# Patient Record
Sex: FEMALE | Race: BLACK OR AFRICAN AMERICAN | Employment: UNEMPLOYED | URBAN - METROPOLITAN AREA
[De-identification: names, ages, dates, MRNs, and addresses within clinical notes are randomized per-mention and may not be internally consistent; named-entity substitution may affect disease eponyms.]

---

## 2017-01-10 ENCOUNTER — HOSPITAL ENCOUNTER (OUTPATIENT)
Dept: PREADMISSION TESTING | Age: 67
Discharge: HOME | End: 2017-01-10
Payer: OTHER GOVERNMENT

## 2017-01-10 VITALS
OXYGEN SATURATION: 95 % | WEIGHT: 233.69 LBS | HEIGHT: 66 IN | DIASTOLIC BLOOD PRESSURE: 91 MMHG | BODY MASS INDEX: 37.56 KG/M2 | RESPIRATION RATE: 20 BRPM | SYSTOLIC BLOOD PRESSURE: 135 MMHG | TEMPERATURE: 98.6 F | HEART RATE: 76 BPM

## 2017-01-10 LAB
ATRIAL RATE: 65 BPM
CALCULATED P AXIS, ECG09: 72 DEGREES
CALCULATED R AXIS, ECG10: 28 DEGREES
CALCULATED T AXIS, ECG11: 44 DEGREES
DIAGNOSIS, 93000: NORMAL
P-R INTERVAL, ECG05: 172 MS
Q-T INTERVAL, ECG07: 394 MS
QRS DURATION, ECG06: 70 MS
QTC CALCULATION (BEZET), ECG08: 409 MS
VENTRICULAR RATE, ECG03: 65 BPM

## 2017-01-10 PROCEDURE — 93005 ELECTROCARDIOGRAM TRACING: CPT

## 2017-01-10 NOTE — PERIOP NOTES
[facility]  PREOPERATIVE INSTRUCTIONS    Surgery Date:   1/18/2017  Surgery arrival time given by surgeon: YES 0800  1. Please report at the designated time to the 2nd [address] [facility]. Bring your insurance card, photo identification, and any copayment ( if applicable). 2. You must have a responsible adult to drive you home. You need to have a responsible adult to stay with you the first 24 hours after surgery if you are going home the same day of your surgery and you should not drive a car for 24 hours following your surgery. 3. Nothing to eat or drink after midnight the night before surgery. This includes no water, gum, mints, coffee, juice, etc.  Please note special instructions, if applicable, below for medications. 4. MEDICATIONS TO TAKE THE MORNING OF SURGERY WITH A SIP OF WATER: Zantac  5. No alcoholic beverages 24 hours before or after your surgery. 6. If you are being admitted to the hospital,please leave personal belongings/luggage in your car until you have an assigned hospital room number. 7. Stop Aspirin and/or any non-steroidal anti-inflammatory drugs (i.e. Ibuprofen, Naproxen, Advil, Aleve) as directed by your surgeon. You may take Tylenol. Stop herbal supplements 1 week prior to  surgery. 8. If you are currently taking Plavix, Coumadin,or any other blood-thinning/anticoagulant medication contact your surgeon for instructions. 9. Please wear comfortable clothes. Wear your glasses instead of contacts. We ask that all money, jewelry and valuables be left at home. Wear no make up, particularly mascara, the day of surgery. 10.  All body piercings, rings,and jewelry need to be removed and left at home. Please wear your hair loose or down. Please no pony-tails, buns, or any metal hair accessories. If you shower the morning of surgery, please do not apply any lotions, powders, or deodorants afterwards. Do not shave any body area within 24 hours of your surgery.   11. Please follow all instructions to avoid any potential surgical cancellation. 12.  Should your physical condition change, (i.e. fever, cold, flu, etc.) please notify your surgeon as soon as possible. 13. It is important to be on time. If a situation occurs where you may be delayed, please call: / [phone] on the day of surgery. 14. The [facility] staff can be reached at 21 [phone]. .  15. Special instructions: free  parking,wear bra for support    The patient was contacted  in person. She  verbalize  understanding of all instructions does not  need reinforcement.

## 2017-01-17 ENCOUNTER — ANESTHESIA EVENT (OUTPATIENT)
Dept: SURGERY | Age: 67
End: 2017-01-17
Payer: OTHER GOVERNMENT

## 2017-01-18 ENCOUNTER — APPOINTMENT (OUTPATIENT)
Dept: MAMMOGRAPHY | Age: 67
End: 2017-01-18
Attending: SURGERY
Payer: OTHER GOVERNMENT

## 2017-01-18 ENCOUNTER — SURGERY (OUTPATIENT)
Age: 67
End: 2017-01-18

## 2017-01-18 ENCOUNTER — ANESTHESIA (OUTPATIENT)
Dept: SURGERY | Age: 67
End: 2017-01-18
Payer: OTHER GOVERNMENT

## 2017-01-18 ENCOUNTER — TELEPHONE (OUTPATIENT)
Dept: SURGERY | Age: 67
End: 2017-01-18

## 2017-01-18 DIAGNOSIS — C50.412 BREAST CANCER OF UPPER-OUTER QUADRANT OF LEFT FEMALE BREAST (HCC): Primary | ICD-10-CM

## 2017-01-18 PROCEDURE — 77030020143 HC AIRWY LARYN INTUB CGAS -A: Performed by: ANESTHESIOLOGY

## 2017-01-18 PROCEDURE — 74011250636 HC RX REV CODE- 250/636

## 2017-01-18 PROCEDURE — 76000 FLUOROSCOPY <1 HR PHYS/QHP: CPT | Performed by: ANESTHESIOLOGY

## 2017-01-18 PROCEDURE — 77030020782 HC GWN BAIR PAWS FLX 3M -B

## 2017-01-18 PROCEDURE — 74011000250 HC RX REV CODE- 250

## 2017-01-18 RX ORDER — MIDAZOLAM HYDROCHLORIDE 1 MG/ML
INJECTION, SOLUTION INTRAMUSCULAR; INTRAVENOUS AS NEEDED
Status: DISCONTINUED | OUTPATIENT
Start: 2017-01-18 | End: 2017-01-18 | Stop reason: HOSPADM

## 2017-01-18 RX ORDER — FENTANYL CITRATE 50 UG/ML
INJECTION, SOLUTION INTRAMUSCULAR; INTRAVENOUS AS NEEDED
Status: DISCONTINUED | OUTPATIENT
Start: 2017-01-18 | End: 2017-01-18 | Stop reason: HOSPADM

## 2017-01-18 RX ORDER — DEXAMETHASONE SODIUM PHOSPHATE 4 MG/ML
INJECTION, SOLUTION INTRA-ARTICULAR; INTRALESIONAL; INTRAMUSCULAR; INTRAVENOUS; SOFT TISSUE AS NEEDED
Status: DISCONTINUED | OUTPATIENT
Start: 2017-01-18 | End: 2017-01-18 | Stop reason: HOSPADM

## 2017-01-18 RX ORDER — ONDANSETRON 2 MG/ML
INJECTION INTRAMUSCULAR; INTRAVENOUS AS NEEDED
Status: DISCONTINUED | OUTPATIENT
Start: 2017-01-18 | End: 2017-01-18 | Stop reason: HOSPADM

## 2017-01-18 RX ORDER — LIDOCAINE HYDROCHLORIDE 20 MG/ML
INJECTION, SOLUTION EPIDURAL; INFILTRATION; INTRACAUDAL; PERINEURAL AS NEEDED
Status: DISCONTINUED | OUTPATIENT
Start: 2017-01-18 | End: 2017-01-18 | Stop reason: HOSPADM

## 2017-01-18 RX ORDER — PROPOFOL 10 MG/ML
INJECTION, EMULSION INTRAVENOUS AS NEEDED
Status: DISCONTINUED | OUTPATIENT
Start: 2017-01-18 | End: 2017-01-18 | Stop reason: HOSPADM

## 2017-01-18 RX ADMIN — METHYLENE BLUE 4 ML: 10 INJECTION, SOLUTION INTRAVENOUS at 10:07

## 2017-01-18 RX ADMIN — FENTANYL CITRATE 50 MCG: 50 INJECTION, SOLUTION INTRAMUSCULAR; INTRAVENOUS at 09:46

## 2017-01-18 RX ADMIN — ONDANSETRON 4 MG: 2 INJECTION INTRAMUSCULAR; INTRAVENOUS at 09:59

## 2017-01-18 RX ADMIN — FENTANYL CITRATE 50 MCG: 50 INJECTION, SOLUTION INTRAMUSCULAR; INTRAVENOUS at 09:41

## 2017-01-18 RX ADMIN — FENTANYL CITRATE 50 MCG: 50 INJECTION, SOLUTION INTRAMUSCULAR; INTRAVENOUS at 10:03

## 2017-01-18 RX ADMIN — MIDAZOLAM HYDROCHLORIDE 2 MG: 1 INJECTION, SOLUTION INTRAMUSCULAR; INTRAVENOUS at 09:41

## 2017-01-18 RX ADMIN — DEXAMETHASONE SODIUM PHOSPHATE 8 MG: 4 INJECTION, SOLUTION INTRA-ARTICULAR; INTRALESIONAL; INTRAMUSCULAR; INTRAVENOUS; SOFT TISSUE at 09:59

## 2017-01-18 RX ADMIN — LIDOCAINE HYDROCHLORIDE 40 MG: 20 INJECTION, SOLUTION EPIDURAL; INFILTRATION; INTRACAUDAL; PERINEURAL at 09:46

## 2017-01-18 RX ADMIN — BUPIVACAINE HYDROCHLORIDE AND EPINEPHRINE 10 ML: 5; 5 INJECTION, SOLUTION EPIDURAL; INTRACAUDAL; PERINEURAL at 10:33

## 2017-01-18 RX ADMIN — PROPOFOL 150 MG: 10 INJECTION, EMULSION INTRAVENOUS at 09:46

## 2017-01-18 NOTE — ANESTHESIA PREPROCEDURE EVALUATION
Anesthetic History     PONV          Review of Systems / Medical History  Patient summary reviewed and pertinent labs reviewed    Pulmonary        Sleep apnea        Comments: ??  To have sleep study   Neuro/Psych   Within defined limits           Cardiovascular                  Exercise tolerance: >4 METS     GI/Hepatic/Renal     GERD: well controlled           Endo/Other        Obesity     Other Findings              Physical Exam    Airway  Mallampati: II  TM Distance: 4 - 6 cm  Neck ROM: normal range of motion   Mouth opening: Normal     Cardiovascular  Regular rate and rhythm,  S1 and S2 normal,  no murmur, click, rub, or gallop  Rhythm: regular  Rate: normal         Dental  No notable dental hx       Pulmonary  Breath sounds clear to auscultation               Abdominal  GI exam deferred       Other Findings            Anesthetic Plan    ASA: 2  Anesthesia type: general          Induction: Intravenous  Anesthetic plan and risks discussed with: Patient

## 2017-01-18 NOTE — ANESTHESIA POSTPROCEDURE EVALUATION
Post-Anesthesia Evaluation and Assessment    Patient: Shirley Maradiaga MRN: [id number]  SSN: [id number]    YOB: 1950  Age: 61 y.o. Sex: female       Cardiovascular Function/Vital Signs  Visit Vitals    /78    Pulse 67    Temp 36.4 °C (97.5 °F)    Resp 17    Ht 5' 6\" (1.676 m)    Wt 105.9 kg (233 lb 7.5 oz)    SpO2 99%    BMI 37.68 kg/m2       Patient is status post general anesthesia for Procedure(s):  LEFT BREAST LUMPECTOMY AND LEFT BREAST SENTINEL NODE BIOPSY WITH BLUE DYE  SENTINEL NODE BIOPSY. Nausea/Vomiting: None    Postoperative hydration reviewed and adequate. Pain:  Pain Scale 1: Numeric (0 - 10) (01/18/17 1157)  Pain Intensity 1: 3 (01/18/17 1157)   Managed    Neurological Status:   Neuro (WDL): Exceptions to WDL (01/18/17 1105)  Neuro  Neurologic State: Drowsy; Eyes open to voice (01/18/17 1105)  LUE Motor Response: Purposeful (01/18/17 1105)  LLE Motor Response: Purposeful (01/18/17 1105)  RUE Motor Response: Purposeful (01/18/17 1105)  RLE Motor Response: Purposeful (01/18/17 1105)   At baseline    Mental Status and Level of Consciousness: Arousable    Pulmonary Status:   O2 Device: Room air (01/18/17 1105)   Adequate oxygenation and airway patent    Complications related to anesthesia: None    Post-anesthesia assessment completed.  No concerns    Signed By: Wendy Owen MD     January 18, 2017

## 2017-01-19 ENCOUNTER — HOSPITAL ENCOUNTER (OUTPATIENT)
Dept: GENERAL RADIOLOGY | Age: 67
Setting detail: OUTPATIENT SURGERY
Discharge: HOME | End: 2017-01-19
Attending: SURGERY
Payer: OTHER GOVERNMENT

## 2017-01-19 DIAGNOSIS — Z98.890 S/P LUMPECTOMY, LEFT BREAST: ICD-10-CM

## 2017-01-19 PROCEDURE — 76000 FLUOROSCOPY <1 HR PHYS/QHP: CPT

## 2017-01-24 ENCOUNTER — OFFICE VISIT (OUTPATIENT)
Dept: SURGERY | Age: 67
End: 2017-01-24

## 2017-01-24 VITALS
WEIGHT: 233 LBS | HEIGHT: 66 IN | SYSTOLIC BLOOD PRESSURE: 136 MMHG | DIASTOLIC BLOOD PRESSURE: 96 MMHG | HEART RATE: 96 BPM | BODY MASS INDEX: 37.45 KG/M2

## 2017-01-24 DIAGNOSIS — C50.412 BREAST CANCER OF UPPER-OUTER QUADRANT OF LEFT FEMALE BREAST (HCC): Primary | ICD-10-CM

## 2017-01-24 NOTE — PROGRESS NOTES
HISTORY OF PRESENT ILLNESS  Donna Garay is a 61 y.o. female. HPI ESTABLISHED patient POD#6 LEFT lumpectomy and SNBx . Patient is doing well. The incisions are looking better than they did last week. She has a lot of bruising. No pain but she feels a pulling sensation when she moves around. Incisions are dry and intact. 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 0/4 nodes,  %. [place] 80%, Her 2 negative    ROS    Physical Exam   Pulmonary/Chest: Left breast exhibits skin change (diffuse ecchymosis). Left breast exhibits no tenderness. ASPIRATION OF HEMATOMA  Indication : Hematoma: Left Breast upper outer quadrant  Prep : Alcohol. Guidance : Ultrasound guidance. Yield :  2 cc old blood was aspirated with an 18 gauge needle. Effect : The hematoma was aspirated. Diposition:  Follow up 1 - 2 weeks if pain persists  ASSESSMENT and PLAN    ICD-10-CM ICD-9-CM    1. 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 Reviewed pathology report. 2. Pt has a lot of bruising. Minimal aspiration. Follow up next week if swelling has not resolved.   3. appts with med onc and rad onc

## 2017-01-24 NOTE — TELEPHONE ENCOUNTER
appointment 02/13/2017 @ 3:00 PM   appointment 02/16/2017 @ 1:00 PM  I spoke with the patient and gave her this information. marianna

## 2017-01-24 NOTE — MR AVS SNAPSHOT
Visit Information Date & Time Provider Department Dept. Phone Encounter #  
 1/24/2017 10:00 AM Patricia Green  [facility] [phone] [id number] Your Appointments 1/26/2017 11:20 AM  
New Patient with Isrrael Weston MD  
[address] [facility] ([address]) Appt Note: NP; referred by Dr. Niya Arenas at [facility]; JELLY; Dx 5-6yrs ago; used to be on PAP  
 [address] [facility] [phone] [address] [facility] [address] [address] 2/13/2017  3:00 PM  
New Patient with Malena Alicia MD  
[address] [facility]) Appt Note: new joseph breast  
 [address] [facility] [address] [id number]  
[address] [place] [address] [address] [facility] [address] Reagan Crawford Upcoming Health Maintenance Date Due Hepatitis C Screening 1950 Pneumococcal 19-64 Highest Risk (1 of 3 - PCV13) 6/22/1971 DTaP/Tdap/Td series (1 - Tdap) 6/22/1973 PAP AKA CERVICAL CYTOLOGY 6/22/1973 FOBT Q 1 YEAR AGE 50-75 6/22/2002 ZOSTER VACCINE AGE 60> 6/22/2012 INFLUENZA AGE 9 TO ADULT 8/1/2016 BREAST CANCER SCRN MAMMOGRAM 11/22/2018 Allergies as of 1/24/2017  Review Complete On: 1/24/2017 By: Patricia Green MD  
  
 Severity Noted Reaction Type Reactions Other Medication  01/10/2017    Hives  
 holter monitor pads Current Immunizations  Never Reviewed No immunizations on file. Not reviewed this visit Vitals BP Pulse Height(growth percentile) Weight(growth percentile) BMI OB Status (!) 136/96 96 5' 6\" (1.676 m) 233 lb (105.7 kg) 37.61 kg/m2 Hysterectomy Smoking Status Never Smoker Vitals History BMI and BSA Data Body Mass Index Body Surface Area  
 37.61 kg/m 2 2.22 m 2 Your Updated Medication List  
  
   
 This list is accurate as of: 1/24/17 11:37 AM.  Always use your most recent med list.  
  
  
  
  
 ASPIR-LOW 81 mg tablet Generic drug:  aspirin delayed-release Takes irregularly,usually weekly DAILY MULTI-VITAMINS/IRON tablet Generic drug:  multivitamin with iron Take 1 Tab by mouth daily. HYDROcodone-acetaminophen 5-325 mg per tablet Commonly known as:  March Castles Take 1-2 Tabs by mouth every four (4) hours as needed for Pain. Max Daily Amount: 12 Tabs. raNITIdine 150 mg tablet Commonly known as:  ZANTAC  
daily. simvastatin 20 mg tablet Commonly known as:  ZOCOR  
20 mg nightly. Patient Instructions Breast Cancer: Care Instructions Your Care Instructions Breast cancer occurs when abnormal cells grow out of control in the breast. These cancer cells can spread within the breast, to nearby lymph nodes and other tissues, and to other parts of the body. Being treated for cancer can weaken your body, and you may feel very tired. Get the rest your body needs so you can feel better. Finding out that you have cancer is scary. You may feel many emotions and may need some help coping. Seek out family, friends, and counselors for support. You also can do things at home to make yourself feel better while you go through treatment. Call the [employer] (2-868.565.3219) or visit its website at [address]Owl. Ecelles Carson for more information. Follow-up care is a key part of your treatment and safety. Be sure to make and go to all appointments, and call your doctor if you are having problems. It's also a good idea to know your test results and keep a list of the medicines you take. How can you care for yourself at home? · Take your medicines exactly as prescribed. Call your doctor if you think you are having a problem with your medicine. You may get medicine for nausea and vomiting if you have these side effects. · Follow your doctor's instructions to relieve pain. Pain from cancer and surgery can almost always be controlled. Use pain medicine when you first notice pain, before it becomes severe. · Eat healthy food. If you do not feel like eating, try to eat food that has protein and extra calories to keep up your strength and prevent weight loss. Drink liquid meal replacements for extra calories and protein. Try to eat your main meal early. · Get some physical activity every day, but do not get too tired. Keep doing the hobbies you enjoy as your energy allows. · Do not smoke. Smoking can make your cancer worse. If you need help quitting, talk to your doctor about stop-smoking programs and medicines. These can increase your chances of quitting for good. · Take steps to control your stress and workload. Learn relaxation techniques. ¨ Share your feelings. Stress and tension affect our emotions. By expressing your feelings to others, you may be able to understand and cope with them. ¨ Consider joining a support group. Talking about a problem with your spouse, a good friend, or other people with similar problems is a good way to reduce tension and stress. ¨ Express yourself through art. Try writing, crafts, dance, or art to relieve stress. Some dance, writing, or art groups may be available just for people who have cancer. ¨ Be kind to your body and mind. Getting enough sleep, eating a healthy diet, and taking time to do things you enjoy can contribute to an overall feeling of balance in your life and can help reduce stress. ¨ Get help if you need it. Discuss your concerns with your doctor or counselor. · If you are vomiting or have diarrhea: ¨ Drink plenty of fluids (enough so that your urine is light yellow or clear like water) to prevent dehydration. Choose water and other caffeine-free clear liquids.  If you have kidney, heart, or liver disease and have to limit fluids, talk with your doctor before you increase the amount of fluids you drink. ¨ When you are able to eat, try clear soups, mild foods, and liquids until all symptoms are gone for 12 to 48 hours. Other good choices include dry toast, crackers, cooked cereal, and gelatin dessert, such as Jell-O. · If you have not already done so, prepare a list of advance directives. Advance directives are instructions to your doctor and family members about what kind of care you want if you become unable to speak or express yourself. When should you call for help? Call your doctor now or seek immediate medical care if: 
· You have a fever. · Any part of your breast becomes red, tender, swollen, or hot. · You have pain, redness, or swelling in the arm on the same side as your breast cancer. Watch closely for changes in your health, and be sure to contact your doctor if: 
· You have pain that is not controlled by medicine. · You have nausea or vomiting. · You are constipated or have diarrhea. Where can you learn more? Go to http://janina-viviana.info/. Enter V321 in the search box to learn more about \"Breast Cancer: Care Instructions. \" Current as of: July 26, 2016 Content Version: 11.1 © 0069-8577 HyTrust. Care instructions adapted under license by [employer] (which disclaims liability or warranty for this information). If you have questions about a medical condition or this instruction, always ask your healthcare professional. [place] [address] any warranty or liability for your use of this information. Breast Cancer: Care Instructions Your Care Instructions Breast cancer occurs when abnormal cells grow out of control in the breast. These cancer cells can spread within the breast, to nearby lymph nodes and other tissues, and to other parts of the body. Being treated for cancer can weaken your body, and you may feel very tired. Get the rest your body needs so you can feel better. Finding out that you have cancer is scary. You may feel many emotions and may need some help coping. Seek out family, friends, and counselors for support. You also can do things at home to make yourself feel better while you go through treatment. Call the Fabián Callejas (3-502.237.1665) or visit its website at [address] [employer] Networks. OnDeck for more information. Follow-up care is a key part of your treatment and safety. Be sure to make and go to all appointments, and call your doctor if you are having problems. It's also a good idea to know your test results and keep a list of the medicines you take. How can you care for yourself at home? · Take your medicines exactly as prescribed. Call your doctor if you think you are having a problem with your medicine. You may get medicine for nausea and vomiting if you have these side effects. · Follow your doctor's instructions to relieve pain. Pain from cancer and surgery can almost always be controlled. Use pain medicine when you first notice pain, before it becomes severe. · Eat healthy food. If you do not feel like eating, try to eat food that has protein and extra calories to keep up your strength and prevent weight loss. Drink liquid meal replacements for extra calories and protein. Try to eat your main meal early. · Get some physical activity every day, but do not get too tired. Keep doing the hobbies you enjoy as your energy allows. · Do not smoke. Smoking can make your cancer worse. If you need help quitting, talk to your doctor about stop-smoking programs and medicines. These can increase your chances of quitting for good. · Take steps to control your stress and workload. Learn relaxation techniques. ¨ Share your feelings. Stress and tension affect our emotions. By expressing your feelings to others, you may be able to understand and cope with them. ¨ Consider joining a support group.  Talking about a problem with your spouse, a good friend, or other people with similar problems is a good way to reduce tension and stress. ¨ Express yourself through art. Try writing, crafts, dance, or art to relieve stress. Some dance, writing, or art groups may be available just for people who have cancer. ¨ Be kind to your body and mind. Getting enough sleep, eating a healthy diet, and taking time to do things you enjoy can contribute to an overall feeling of balance in your life and can help reduce stress. ¨ Get help if you need it. Discuss your concerns with your doctor or counselor. · If you are vomiting or have diarrhea: ¨ Drink plenty of fluids (enough so that your urine is light yellow or clear like water) to prevent dehydration. Choose water and other caffeine-free clear liquids. If you have kidney, heart, or liver disease and have to limit fluids, talk with your doctor before you increase the amount of fluids you drink. ¨ When you are able to eat, try clear soups, mild foods, and liquids until all symptoms are gone for 12 to 48 hours. Other good choices include dry toast, crackers, cooked cereal, and gelatin dessert, such as Jell-O. · If you have not already done so, prepare a list of advance directives. Advance directives are instructions to your doctor and family members about what kind of care you want if you become unable to speak or express yourself. When should you call for help? Call your doctor now or seek immediate medical care if: 
· You have a fever. · Any part of your breast becomes red, tender, swollen, or hot. · You have pain, redness, or swelling in the arm on the same side as your breast cancer. Watch closely for changes in your health, and be sure to contact your doctor if: 
· You have pain that is not controlled by medicine. · You have nausea or vomiting. · You are constipated or have diarrhea. Where can you learn more? Go to http://janina-viviana.info/. Enter V321 in the search box to learn more about \"Breast Cancer: Care Instructions. \" Current as of: July 26, 2016 Content Version: 11.1 © 7966-9716 Transparentrees, RiffRaff. Care instructions adapted under license by WebNotes (which disclaims liability or warranty for this information). If you have questions about a medical condition or this instruction, always ask your healthcare professional. [address] any warranty or liability for your use of this information. Introducing [facility] & HEALTH SERVICES! Dear Zoie Starkey: Thank you for requesting a [employer] account. Our records indicate that you have previously registered for a [employer] account but its currently inactive. Please call our [employer] support line at [phone]. Additional Information If you have questions, please visit the Frequently Asked Questions section of the [employer] website at https://Nano Game [email or web address]. Innovacene. Beacon Reader/Nano Game Studio/. Remember, [employer] is NOT to be used for urgent needs. For medical emergencies, dial 911. Now available from your iPhone and Android! Please provide this summary of care documentation to your next provider. Your primary care clinician is listed as Daniela Neri. If you have any questions after today's visit, please call [phone].

## 2017-01-24 NOTE — COMMUNICATION BODY
HISTORY OF PRESENT ILLNESS  Yesenia Lee is a 61 y.o. female. HPI ESTABLISHED patient POD#6 LEFT lumpectomy and SNBx . Patient is doing well. The incisions are looking better than they did last week. She has a lot of bruising. No pain but she feels a pulling sensation when she moves around. Incisions are dry and intact. 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 0/4 nodes,  %. [place] 80%, Her 2 negative    ROS    Physical Exam   Pulmonary/Chest: Left breast exhibits skin change (diffuse ecchymosis). Left breast exhibits no tenderness. ASPIRATION OF HEMATOMA  Indication : Hematoma: Left Breast upper outer quadrant  Prep : Alcohol. Guidance : Ultrasound guidance. Yield :  2 cc old blood was aspirated with an 18 gauge needle. Effect : The hematoma was aspirated. Diposition:  Follow up 1 - 2 weeks if pain persists  ASSESSMENT and PLAN    ICD-10-CM ICD-9-CM    1. 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 Reviewed pathology report. 2. Pt has a lot of bruising. Minimal aspiration. Follow up next week if swelling has not resolved.   3. appts with med onc and rad onc

## 2017-01-26 ENCOUNTER — HOSPITAL ENCOUNTER (OUTPATIENT)
Dept: SLEEP MEDICINE | Age: 67
Discharge: HOME | End: 2017-01-26
Payer: OTHER GOVERNMENT

## 2017-01-26 ENCOUNTER — OFFICE VISIT (OUTPATIENT)
Dept: SLEEP MEDICINE | Age: 67
End: 2017-01-26

## 2017-01-26 VITALS
OXYGEN SATURATION: 100 % | HEART RATE: 84 BPM | DIASTOLIC BLOOD PRESSURE: 80 MMHG | HEIGHT: 66 IN | TEMPERATURE: 97.4 F | BODY MASS INDEX: 37.45 KG/M2 | SYSTOLIC BLOOD PRESSURE: 123 MMHG | WEIGHT: 233 LBS

## 2017-01-26 DIAGNOSIS — E66.9 OBESITY (BMI 30-39.9): ICD-10-CM

## 2017-01-26 DIAGNOSIS — G47.33 OBSTRUCTIVE SLEEP APNEA (ADULT) (PEDIATRIC): Primary | ICD-10-CM

## 2017-01-26 PROCEDURE — 95806 SLEEP STUDY UNATT&RESP EFFT: CPT

## 2017-01-26 NOTE — PROGRESS NOTES
217 [facility] [place]. [place], [address]  Tel.  [phone]  Fax. 100 [facility] 60  [place], [address] [facility]  Tel.  [phone]  Fax. [phone] [facility]6 [facility] 3 Skye Maldonado  Tel.  [phone]  Fax. [phone]         Subjective:      Ying Trevino is an 61 y.o. female referred by Dr. Alissa De La Torre, for evaluation for a sleep disorder. She complains of snoring, snorting, choking, periods of not breathing associated with excessive daytime sleepiness. Symptoms began 10 years ago, gradually worsening since that time. She usually can fall asleep in 10 minutes. Family or house members note snoring, snorting, choking, periods of not breathing. She denies falling asleep while driving. Ying Trevino does wake up frequently at night. She is not bothered by waking up too early and left unable to get back to sleep. She actually sleeps about 7 hours at night and wakes up about 1 times during the night. She   work shifts:  .   Karuna Good indicates she   get too little sleep at night. Her bedtime is 2300. She awakens at  . She does not take naps. . She has the following observed behaviors: Loud snoring, Pauses in breathing, Grinding teeth;  . Other remarks: waking with a gasp or snort  she was diagnosed with sleep apnea 10 years ago. she was started on CPAP at a setting of ? cm H20.  she has not been using it regularly. She said her old machine stopped working years ago. When she was using it, her  says it worked to control her apnea. She had a lumpectomy a week ago. She still has sutures at the surgical site    [place] Sleepiness Score: 7      Allergies   Allergen Reactions    Other Medication Hives     holter monitor pads         Current Outpatient Prescriptions:     ASPIR-LOW 81 mg tablet, Takes irregularly,usually weekly, Disp: , Rfl:     raNITIdine (ZANTAC) 150 mg tablet, daily. , Disp: , Rfl:     simvastatin (ZOCOR) 20 mg tablet, 20 mg nightly. , Disp: , Rfl:     multivitamin with iron (DAILY MULTI-VITAMINS/IRON) tablet, Take 1 Tab by mouth daily. , Disp: , Rfl:     HYDROcodone-acetaminophen (NORCO) 5-325 mg per tablet, Take 1-2 Tabs by mouth every four (4) hours as needed for Pain. Max Daily Amount: 12 Tabs., Disp: 20 Tab, Rfl: 0     She  has a past medical history of [facility] ([facility] ([id number] ); GERD (gastroesophageal reflux disease); Hypercholesterolemia; Nausea & vomiting; and Sleep apnea. She  has a past surgical history that includes breast surgery procedure unlisted (1991); breast biopsy (Left, 2016); gyn (1999); and breast lumpectomy (Left, 1/18/2017). She family history includes Breast Cancer in her mother; Cancer in her sister; Dementia in her mother; Heart Disease in her father. She  reports that she has never smoked. She does not have any smokeless tobacco history on file. She reports that she does not drink alcohol or use illicit drugs. Review of Systems:  Constitutional:  +15 weight gain. Eyes:  No blurred vision. CVS:  No significant chest pain  Pulm:  No significant shortness of breath  GI:  No significant nausea or vomiting  :  No significant nocturia  Musculoskeletal:  No significant joint pain at night  Skin:  No significant rashes  Neuro:  No significant dizziness   Psych:  No active mood issues    Sleep Review of Systems: notable for no difficulty falling asleep; infrequent awakenings at night;  occasional dreaming noted; no nightmares ; no early morning headaches; no memory problems; no concentration issues; no history of any automobile or occupational accidents due to daytime drowsiness.       Objective:     Visit Vitals    /80    Pulse 84    Temp 97.4 °F (36.3 °C)    Ht 5' 6\" (1.676 m)    Wt 233 lb (105.7 kg)    SpO2 100%    BMI 37.61 kg/m2         General:   Not in acute distress   Eyes:  Anicteric sclerae, no obvious strabismus   Nose:  No obvious nasal septum deviation    Oropharynx:   Class 3 oropharyngeal outlet, thick tongue base, enlarged and boggy uvula, low-lying soft palate, narrow tonsilo-pharyngeal pilars   Tonsils:   tonsils are present and normal   Neck:   Neck circ. in \"inches\": 15; midline trachea   Chest/Lungs:  Equal lung expansion, clear on auscultation    CVS:  Normal rate, regular rhythm; no JVD   Skin:  Warm to touch; no obvious rashes   Neuro:  No focal deficits ; no obvious tremor    Psych:  Normal affect,  normal countenance;          Assessment:       ICD-10-CM ICD-9-CM    1. Obstructive sleep apnea (adult) (pediatric) G47.33 327.23 SLEEP STUDY UNATTENDED, 4 CHANNEL   2. Obesity (BMI 30-39. 9) E66.9 278.00          Plan:     * The patient currently has a Moderate Risk for having sleep apnea. STOP-BANG score 5.  * PSG was ordered for initial evaluation. Tech to instruct patient to put effort belt on abdomen to avoid the surgical site of the lumpectomy. * She was provided information on sleep apnea including coresponding risk factors and the importance of proper treatment. * Counseling was provided regarding proper sleep hygiene and safe driving. * I will call her with the results. Treatment options for sleep apnea were reviewed. she is not against a trial of PAP if found to have significant sleep apnea. 2. Obesity - I have counseled the patient regarding the benefits of weight loss. Thank you for allowing us to participate in your patient's medical care. We'll keep you updated on these investigations.   Rimma Shanks MD  Diplomate in Sleep Medicine  [facility]

## 2017-01-26 NOTE — PATIENT INSTRUCTIONS
217 [facility]., [place] [place], [address]  Tel.  [phone]  Fax. 100 [facility] 60  [place], 200 [address] [facility]  Tel.  [phone]  Fax. [phone] [address]  Tel.  [phone]  Fax. [phone]     Sleep Apnea: After Your Visit  Your Care Instructions  Sleep apnea occurs when you frequently stop breathing for 10 seconds or longer during sleep. It can be mild to severe, based on the number of times per hour that you stop breathing or have slowed breathing. Blocked or narrowed airways in your nose, mouth, or throat can cause sleep apnea. Your airway can become blocked when your throat muscles and tongue relax during sleep. Sleep apnea is common, occurring in 1 out of 20 individuals. Individuals having any of the following characteristics should be evaluated and treated right away due to high risk and detrimental consequences from untreated sleep apnea:  1. Obesity  2. Congestive Heart failure  3. Atrial Fibrillation  4. Uncontrolled Hypertension  5. Type II Diabetes  6. Night-time Arrhythmias  7. Stroke  8. Pulmonary Hypertension  9. High-risk Driving Populations (pilots, truck drivers, etc.)  10. Patients Considering Weight-loss Surgery    How do you know you have sleep apnea? You probably have sleep apnea if you answer 'yes' to 3 or more of the following questions:  S - Have you been told that you Snore? T - Are you often Tired during the day? O - Has anyone Observed you stop breathing while sleeping? P- Do you have (or are being treated for) high blood Pressure? B - Are you obese (Body Mass Index > 35)? A - Is your Age 48years old or older? N - Is your Neck size greater than 16 inches? G - Are you male Gender? A sleep physician can prescribe a breathing device that prevents tissues in the throat from blocking your airway.  Or your doctor may recommend using a dental device (oral breathing device) to help keep your airway open. In some cases, surgery may be needed to remove enlarged tissues in the throat. Follow-up care is a key part of your treatment and safety. Be sure to make and go to all appointments, and call your doctor if you are having problems. It's also a good idea to know your test results and keep a list of the medicines you take. How can you care for yourself at home? · Lose weight, if needed. It may reduce the number of times you stop breathing or have slowed breathing. · Go to bed at the same time every night. · Sleep on your side. It may stop mild apnea. If you tend to roll onto your back, sew a pocket in the back of your pajama top. Put a tennis ball into the pocket, and stitch the pocket shut. This will help keep you from sleeping on your back. · Avoid alcohol and medicines such as sleeping pills and sedatives before bed. · Do not smoke. Smoking can make sleep apnea worse. If you need help quitting, talk to your doctor about stop-smoking programs and medicines. These can increase your chances of quitting for good. · Prop up the head of your bed 4 to 6 inches by putting bricks under the legs of the bed. · Treat breathing problems, such as a stuffy nose, caused by a cold or allergies. · Use a continuous positive airway pressure (CPAP) breathing machine if lifestyle changes do not help your apnea and your doctor recommends it. The machine keeps your airway from closing when you sleep. · If CPAP does not help you, ask your doctor whether you should try other breathing machines. A bilevel positive airway pressure machine has two types of air pressureâone for breathing in and one for breathing out. Another device raises or lowers air pressure as needed while you breathe. · If your nose feels dry or bleeds when using one of these machines, talk with your doctor about increasing moisture in the air. A humidifier may help.   · If your nose is runny or stuffy from using a breathing machine, talk with your doctor about using decongestants or a corticosteroid nasal spray. When should you call for help? Watch closely for changes in your health, and be sure to contact your doctor if:  · You still have sleep apnea even though you have made lifestyle changes. · You are thinking of trying a device such as CPAP. · You are having problems using a CPAP or similar machine. Where can you learn more? Go to Plug.dj. Enter E624 in the search box to learn more about \"Sleep Apnea: After Your Visit. \"   © 3443-6525 Healthwise, [employer]. Care instructions adapted under license by Melanie Luna (which disclaims liability or warranty for this information). This care instruction is for use with your licensed healthcare professional. If you have questions about a medical condition or this instruction, always ask your healthcare professional. [place] any warranty or liability for your use of this information. PROPER SLEEP HYGIENE    What to avoid  · Do not have drinks with caffeine, such as coffee or black tea, for 8 hours before bed. · Do not smoke or use other types of tobacco near bedtime. Nicotine is a stimulant and can keep you awake. · Avoid drinking alcohol late in the evening, because it can cause you to wake in the middle of the night. · Do not eat a big meal close to bedtime. If you are hungry, eat a light snack. · Do not drink a lot of water close to bedtime, because the need to urinate may wake you up during the night. · Do not read or watch TV in bed. Use the bed only for sleeping and sexual activity. What to try  · Go to bed at the same time every night, and wake up at the same time every morning. Do not take naps during the day. · Keep your bedroom quiet, dark, and cool. · Get regular exercise, but not within 3 to 4 hours of your bedtime. .  · Sleep on a comfortable pillow and mattress.   · If watching the clock makes you anxious, turn it facing away from you so you cannot see the time. · If you worry when you lie down, start a worry book. Well before bedtime, write down your worries, and then set the book and your concerns aside. · Try meditation or other relaxation techniques before you go to bed. · If you cannot fall asleep, get up and go to another room until you feel sleepy. Do something relaxing. Repeat your bedtime routine before you go to bed again. · Make your house quiet and calm about an hour before bedtime. Turn down the lights, turn off the TV, log off the computer, and turn down the volume on music. This can help you relax after a busy day. Drowsy Driving  The [address] [place] [address] Road Traffic Safety Administration cites drowsiness as a causing factor in more than 093,973 police reported crashes annually, resulting in 76,000 injuries and 1,500 deaths. Other surveys suggest 55% of people polled have driven while drowsy in the past year, 23% had fallen asleep but not crashed, 3% crashed, and 2% had and accident due to drowsy driving. Who is at risk? Young Drivers: One study of drowsy driving accidents states that 55% of the drivers were under 25 years. Of those, 75% were male. Shift Workers and Travelers: People who work overnight or travel across time zones frequently are at higher risk of experiencing Circadian Rhythm Disorders. They are trying to work and function when their body is programed to sleep. Sleep Deprived: Lack of sleep has a serious impact on your ability to pay attention or focus on a task. Consistently getting less than the average of 8 hours your body needs creates partial or cumulative sleep deprivation. Untreated Sleep Disorders: Sleep Apnea, Narcolepsy, R.L.S., and other sleep disorders (untreated) prevent a person from getting enough restful sleep. This leads to excessive daytime sleepiness and increases the risk for drowsy driving accidents by up to 7 times.   Medications / Alcohol: Even over the counter medications can cause drowsiness. Medications that impair a drivers attention should have a warning label. Alcohol naturally makes you sleepy and on its own can cause accidents. Combined with excessive drowsiness its effects are amplified. Signs of Drowsy Driving:   * You don't remember driving the last few miles   * You may drift out of your angelito   * You are unable to focus and your thoughts wander   * You may yawn more often than normal   * You have difficulty keeping your eyes open / nodding off   * Missing traffic signs, speeding, or tailgating  Prevention-   Good sleep hygiene, lifestyle and behavioral choices have the most impact on drowsy driving. There is no substitute for sleep and the average person requires 8 hours nightly. If you find yourself driving drowsy, stop and sleep. Consider the sleep hygiene tips provided during your visit as well. Medication Refill Policy: Refills for all medications require 1 week advance notice. Please have your pharmacy fax a refill request. We are unable to fax, or call in \"controled substance\" medications and you will need to pick these prescriptions up from our office. [employer] Activation    Thank you for requesting access to [employer]. Please follow the instructions below to securely access and download your online medical record. [employer] allows you to send messages to your doctor, view your test results, renew your prescriptions, schedule appointments, and more. How Do I Sign Up? 1. In your internet browser, go to [email or web address]. 2. Click on the First Time User? Click Here link in the Sign In box. You will see the New Member Sign Up page. 3. Enter your [employer] Access Code exactly as it appears below. You will not need to use this code after youve completed the sign-up process. If you do not sign up before the expiration date, you must request a new code.     [employer] Access Code: [email or web address]  Expires: 4/26/2017 11:48 AM (This is the date your [facility] [employer] access code will )    4. Enter the last four digits of your Social Security Number (xxxx) and Date of Birth (mm/dd/yyyy) as indicated and click Submit. You will be taken to the next sign-up page. 5. Create a [employer] ID. This will be your [facility] login ID and cannot be changed, so think of one that is secure and easy to remember. 6. Create a [facility] password. You can change your password at any time. 7. Enter your Password Reset Question and Answer. This can be used at a later time if you forget your password. 8. Enter your e-mail address. You will receive e-mail notification when new information is available in [address] 9. Click Sign Up. You can now view and download portions of your medical record. 10. Click the Download Summary menu link to download a portable copy of your medical information. Additional Information    If you have questions, please call [phone]. Remember, [facility] is NOT to be used for urgent needs. For medical emergencies, dial 911.

## 2017-01-27 ENCOUNTER — TELEPHONE (OUTPATIENT)
Dept: SLEEP MEDICINE | Age: 67
End: 2017-01-27

## 2017-01-27 DIAGNOSIS — G47.33 OBSTRUCTIVE SLEEP APNEA (ADULT) (PEDIATRIC): Primary | ICD-10-CM

## 2017-01-27 NOTE — TELEPHONE ENCOUNTER
The results of her home sleep study were reviewed. The results were positive for significant sleep disordered breathing. Treatment options were reviewed in detail. she would like to proceed with PAP therapy. I have placed an order for APAP. she will be seen in follow-up in 6 weeks to gauge treatment response and adherence to therapy. All of her questions were addressed. Order PAP and call patient and let them know which [employer] company they should be hearing from. Schedule for first adherence visit in 6 weeks.

## 2017-01-27 NOTE — TELEPHONE ENCOUNTER
HSAT Returned - [facility]  Date of Study: 01/26/2017    The following information was gathered from the patients study log:    · Lights off: 11:45pm  · Estimated sleep onset: 1:00am    · Awakened a total of 4-5 times  · The patient felt they slept 4.5 hours  · Patient took nothing before starting the test  · Sleep quality was worse compared to a usual nights sleep. Further information provided: I had difficulty staying asleep.  Slept better from 6:40 am to 7:50am

## 2017-01-30 ENCOUNTER — OFFICE VISIT (OUTPATIENT)
Dept: SURGERY | Age: 67
End: 2017-01-30

## 2017-01-30 ENCOUNTER — DOCUMENTATION ONLY (OUTPATIENT)
Dept: SLEEP MEDICINE | Age: 67
End: 2017-01-30

## 2017-01-30 ENCOUNTER — TELEPHONE (OUTPATIENT)
Dept: SLEEP MEDICINE | Age: 67
End: 2017-01-30

## 2017-01-30 ENCOUNTER — TELEPHONE (OUTPATIENT)
Dept: SURGERY | Age: 67
End: 2017-01-30

## 2017-01-30 VITALS
SYSTOLIC BLOOD PRESSURE: 144 MMHG | HEIGHT: 66 IN | WEIGHT: 234 LBS | DIASTOLIC BLOOD PRESSURE: 79 MMHG | HEART RATE: 95 BPM | BODY MASS INDEX: 37.61 KG/M2

## 2017-01-30 DIAGNOSIS — C50.412 BREAST CANCER OF UPPER-OUTER QUADRANT OF LEFT FEMALE BREAST (HCC): ICD-10-CM

## 2017-01-30 DIAGNOSIS — T14.8XXA HEMATOMA: Primary | ICD-10-CM

## 2017-01-30 DIAGNOSIS — Z98.890 S/P LUMPECTOMY, LEFT BREAST: ICD-10-CM

## 2017-01-30 NOTE — TELEPHONE ENCOUNTER
The patient called stating she is having clear drainage coming from her incision site. The patient is S/P LEFT breast lumpectomy and SNB. She states discharge started on Saturday and has continued over the weekend and this morning. She spoke to Dr Babette Habermann over the weekend who suggested she see Eulalio Matthews NP today. The patient had an appointment made for her and she was very appreciative.

## 2017-01-30 NOTE — PROGRESS NOTES
HISTORY OF PRESENT ILLNESS  Bridger Rne is a 61 y.o. female. HPI  ESTABLISHED patient here today for post op concern. She has drainage from LEFT breast incision that started on Friday night. Gauze was saturated the next morning with dark red blood. She continues to place gauze/pad over drainage site (she changed the pad 3 times yesterday). The drainage has decreased since Friday. She states the swelling and bruising is also improved. Denies any other breast problems at this time. 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 AR 80%, Her 2 negative  12/2016 LEFT lumpectomy, [facility]NB  1/24/17- 2ml old blood aspirated by Dr. Radha Mock  ROS    Physical Exam   Pulmonary/Chest:       Left breast incision - superior half of the incision open; with minimal pressure, multiple nickel and dime size blood clots were expressed. Clots and old blood expressed; no new blood noted. Incision was re-approximated with steri-strips and covered with a pressure dressing     Visit Vitals    /79    Pulse 95    Ht 5' 6\" (1.676 m)    Wt 234 lb (106.1 kg)    BMI 37.77 kg/m2     ASSESSMENT and PLAN  Encounter Diagnoses   Name Primary?  Hematoma Yes    Breast cancer of upper-outer quadrant of left female breast ([address]     S/P lumpectomy, left breast      Left breast post-op hematoma - clots/old blood expressed and incision was re-approximated. Reviewed pressure dressing and dressing changes with the patient. She will monitor the area and has follow-up with Dr. Radha Mock in 3 days, but will call tomorrow for an earlier appointment if she is saturating her dressing. She is comfortable with this plan. All questions answered and she stated understanding. Case was reviewed with Dr. Radha Mock as well.

## 2017-01-30 NOTE — PATIENT INSTRUCTIONS
Breast Self-Exam: Care Instructions  Your Care Instructions  A breast self-exam is when you check your breasts for lumps or changes. This regular exam helps you learn how your breasts normally look and feel. Most breast problems or changes are not because of cancer. Breast self-exam is not a substitute for a mammogram. Having regular breast exams by your doctor and regular mammograms improve your chances of finding any problems with your breasts. Some women set a time each month to do a step-by-step breast self-exam. Other women like a less formal system. They might look at their breasts as they brush their teeth, or feel their breasts once in a while in the shower. If you notice a change in your breast, tell your doctor. Follow-up care is a key part of your treatment and safety. Be sure to make and go to all appointments, and call your doctor if you are having problems. Its also a good idea to know your test results and keep a list of the medicines you take. How do you do a breast self-exam?  · The best time to examine your breasts is usually one week after your menstrual period begins. Your breasts should not be tender then. If you do not have periods, you might do your exam on a day of the month that is easy to remember. · To examine your breasts:  ¨ Remove all your clothes above the waist and lie down. When you are lying down, your breast tissue spreads evenly over your chest wall, which makes it easier to feel all your breast tissue. ¨ Use the pads--not the fingertips--of the 3 middle fingers of your left hand to check your right breast. Move your fingers slowly in small coin-sized circles that overlap. ¨ Use three levels of pressure to feel of all your breast tissue. Use light pressure to feel the tissue close to the skin surface. Use medium pressure to feel a little deeper. Use firm pressure to feel your tissue close to your breastbone and ribs.  Use each pressure level to feel your breast tissue before moving on to the next spot. ¨ Check your entire breast, moving up and down as if following a strip from the collarbone to the bra line, and from the armpit to the ribs. Repeat until you have covered the entire breast.  ¨ Repeat this procedure for your left breast, using the pads of the 3 middle fingers of your right hand. · To examine your breasts while in the shower:  ¨ Place one arm over your head and lightly soap your breast on that side. ¨ Using the pads of your fingers, gently move your hand over your breast (in the strip pattern described above), feeling carefully for any lumps or changes. ¨ Repeat for the other breast.  · Have your doctor inspect anything you notice to see if you need further testing. Where can you learn more? Go to http://janina-viviana.info/. Enter P148 in the search box to learn more about \"Breast Self-Exam: Care Instructions. \"  Current as of: July 26, 2016  Content Version: 11.1  © 9884-0517 [employer]. Care instructions adapted under license by [employer] (which disclaims liability or warranty for this information). If you have questions about a medical condition or this instruction, always ask your healthcare professional. [place] [address] any warranty or liability for your use of this information.

## 2017-01-30 NOTE — PROGRESS NOTES
HISTORY OF PRESENT ILLNESS  Saira Baxter is a 61 y.o. female.   HPI         ROS    Physical Exam    ASSESSMENT and PLAN  {ASSESSMENT/PLAN:71463}

## 2017-01-31 ENCOUNTER — DOCUMENTATION ONLY (OUTPATIENT)
Dept: SLEEP MEDICINE | Age: 67
End: 2017-01-31

## 2017-02-02 ENCOUNTER — OFFICE VISIT (OUTPATIENT)
Dept: SURGERY | Age: 67
End: 2017-02-02

## 2017-02-02 VITALS
HEART RATE: 89 BPM | BODY MASS INDEX: 37.61 KG/M2 | DIASTOLIC BLOOD PRESSURE: 92 MMHG | SYSTOLIC BLOOD PRESSURE: 154 MMHG | WEIGHT: 234 LBS | HEIGHT: 66 IN

## 2017-02-02 DIAGNOSIS — S21.002S BREAST WOUND, LEFT, SEQUELA: Primary | ICD-10-CM

## 2017-02-02 NOTE — COMMUNICATION BODY
HISTORY OF PRESENT ILLNESS  Donna Garay is a 61 y.o. female. HPI   ESTABLISHED patient here for follow up LEFT breast wound after lumpectomy. She is 2 week post op. She was seen on Monday by Herbie Mccarty NP because she had a lot of drainage over the weekend. Laverta Massa expelled some clotted blood and steristriped the open part of the wound. The dressing fell off Wednesday and she now changes her pad daily with minimal dark red drainage. Steristrips remain in place. Pain is intermittent and rates this at 2/10. Denies any other breast problems at this time.       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 0/4 nodes,  %. [place] 80%, Her 2 negative    1/24/17- 2ml old blood aspirated by Dr. Agnes Hollins  1/30/17 debrided by Herbie Mccarty NP      ROS    Physical Exam   Pulmonary/Chest: Left breast exhibits skin change (the medial half of the wound is open. clotted blood visible in the wound). 1. I debrided the remainder of the clotted blood using gauze. 2. I closed the medial wound with 4-0 nylon suture x 2 stitches. Wound dressed with steristrips, gauze and tape. Keep wound covered as needed. Remove stitches in 2 weeks. ASSESSMENT and PLAN    ICD-10-CM ICD-9-CM    1. Breast wound, left, sequela S21.002S 906.0      1. Pt had a large hematoma which drained spontaneously. The wound opened. I put 2 stitches to re-approximate the wound to facilitate wound healing. 2. She had med onc appts in 2 weeks.

## 2017-02-02 NOTE — MR AVS SNAPSHOT
Visit Information Date & Time Provider Department Dept. Phone Encounter #  
 2/2/2017  1:45 PM Nacho Brar [address] [facility] [phone] [id number] Your Appointments 2/13/2017  3:00 PM  
New Patient with Court Gregorio MD  
[address] [facility]) Appt Note: new joseph breast  
 [address] [place] [address] [address] [id number]  
[phone]  
  
   
 [address] [address]  
  
    
 3/23/2017  9:20 AM  
Any with Goyo Hernandez MD  
[address] [facility] ([address]) Appt Note: 1st adherence visit [address] [phone] [address] [place] [address]., [address] [phone] Upcoming Health Maintenance Date Due Hepatitis C Screening 1950 Pneumococcal 19-64 Highest Risk (1 of 3 - PCV13) 6/22/1971 DTaP/Tdap/Td series (1 - Tdap) 6/22/1973 PAP AKA CERVICAL CYTOLOGY 6/22/1973 FOBT Q 1 YEAR AGE 50-75 6/22/2002 ZOSTER VACCINE AGE 60> 6/22/2012 INFLUENZA AGE 9 TO ADULT 8/1/2016 BREAST CANCER SCRN MAMMOGRAM 11/22/2018 Allergies as of 2/2/2017  Review Complete On: 2/2/2017 By: Nacho Brar MD  
  
 Severity Noted Reaction Type Reactions Other Medication  01/10/2017    Hives  
 holter monitor pads Current Immunizations  Never Reviewed No immunizations on file. Not reviewed this visit You Were Diagnosed With   
  
 Codes Comments Breast wound, left, sequela    -  Primary ICD-10-CM: S21.002S ICD-9-CM: 268. 0 Vitals BP Pulse Height(growth percentile) Weight(growth percentile) BMI OB Status (!) 154/92 89 5' 6\" (1.676 m) 234 lb (106.1 kg) 37.77 kg/m2 Hysterectomy Smoking Status Never Smoker BMI and BSA Data  Body Mass Index Body Surface Area  
 37.77 kg/m 2 2.22 m 2  
  
  
 Your Updated Medication List  
  
   
This list is accurate as of: 2/2/17  3:08 PM.  Always use your most recent med list.  
  
  
  
  
 ASPIR-LOW 81 mg tablet Generic drug:  aspirin delayed-release Takes irregularly,usually weekly DAILY MULTI-VITAMINS/IRON tablet Generic drug:  multivitamin with iron Take 1 Tab by mouth daily. HYDROcodone-acetaminophen 5-325 mg per tablet Commonly known as:  Mao Hilda Take 1-2 Tabs by mouth every four (4) hours as needed for Pain. Max Daily Amount: 12 Tabs. raNITIdine 150 mg tablet Commonly known as:  ZANTAC  
daily. simvastatin 20 mg tablet Commonly known as:  ZOCOR  
20 mg nightly. Patient Instructions Breast Self-Exam: Care Instructions Your Care Instructions A breast self-exam is when you check your breasts for lumps or changes. This regular exam helps you learn how your breasts normally look and feel. Most breast problems or changes are not because of cancer. Breast self-exam is not a substitute for a mammogram. Having regular breast exams by your doctor and regular mammograms improve your chances of finding any problems with your breasts. Some women set a time each month to do a step-by-step breast self-exam. Other women like a less formal system. They might look at their breasts as they brush their teeth, or feel their breasts once in a while in the shower. If you notice a change in your breast, tell your doctor. Follow-up care is a key part of your treatment and safety. Be sure to make and go to all appointments, and call your doctor if you are having problems. Its also a good idea to know your test results and keep a list of the medicines you take. How do you do a breast self-exam? 
· The best time to examine your breasts is usually one week after your menstrual period begins. Your breasts should not be tender then.  If you do not have periods, you might do your exam on a day of the month that is easy to remember. · To examine your breasts: ¨ Remove all your clothes above the waist and lie down. When you are lying down, your breast tissue spreads evenly over your chest wall, which makes it easier to feel all your breast tissue. ¨ Use the padsnot the fingertipsof the 3 middle fingers of your left hand to check your right breast. Move your fingers slowly in small coin-sized circles that overlap. ¨ Use three levels of pressure to feel of all your breast tissue. Use light pressure to feel the tissue close to the skin surface. Use medium pressure to feel a little deeper. Use firm pressure to feel your tissue close to your breastbone and ribs. Use each pressure level to feel your breast tissue before moving on to the next spot. ¨ Check your entire breast, moving up and down as if following a strip from the collarbone to the bra line, and from the armpit to the ribs. Repeat until you have covered the entire breast. 
¨ Repeat this procedure for your left breast, using the pads of the 3 middle fingers of your right hand. · To examine your breasts while in the shower: 
¨ Place one arm over your head and lightly soap your breast on that side. ¨ Using the pads of your fingers, gently move your hand over your breast (in the strip pattern described above), feeling carefully for any lumps or changes. ¨ Repeat for the other breast. 
· Have your doctor inspect anything you notice to see if you need further testing. Where can you learn more? Go to http://janina-viviana.info/. Enter P148 in the search box to learn more about \"Breast Self-Exam: Care Instructions. \" Current as of: July 26, 2016 Content Version: 11.1 © 3813-0171 [employer]. Care instructions adapted under license by [employer] (which disclaims liability or warranty for this information).  If you have questions about a medical condition or this instruction, always ask your healthcare professional. [address] any warranty or liability for your use of this information. Introducing [facility]! Jermaine Monson introduces [facility] patient portal. Now you can access parts of your medical record, email your doctor's office, and request medication refills online. 1. In your internet browser, go to [email or web address] 2. Click on the First Time User? Click Here link in the Sign In box. You will see the New Member Sign Up page. 3. Enter your [facility] Access Code exactly as it appears below. You will not need to use this code after youve completed the sign-up process. If you do not sign up before the expiration date, you must request a new code. · [facility] Access Code: [email or web address] Expires: 4/26/2017 11:48 AM 
 
4. Enter the last four digits of your Social Security Number (xxxx) and Date of Birth (mm/dd/yyyy) as indicated and click Submit. You will be taken to the next sign-up page. 5. Create a [facility] ID. This will be your [facility] login ID and cannot be changed, so think of one that is secure and easy to remember. 6. Create a [facility] password. You can change your password at any time. 7. Enter your Password Reset Question and Answer. This can be used at a later time if you forget your password. 8. Enter your e-mail address. You will receive e-mail notification when new information is available in [address] 9. Click Sign Up. You can now view and download portions of your medical record. 10. Click the Download Summary menu link to download a portable copy of your medical information. If you have questions, please visit the Frequently Asked Questions section of the [employer] website. Remember, [facility] is NOT to be used for urgent needs. For medical emergencies, dial 911. Now available from your iPhone and Android! Please provide this summary of care documentation to your next provider. Your primary care clinician is listed as Ronaldo Farrell. If you have any questions after today's visit, please call [phone].

## 2017-02-02 NOTE — PROGRESS NOTES
HISTORY OF PRESENT ILLNESS  Shirley Maradiaga is a 61 y.o. female. HPI   ESTABLISHED patient here for follow up LEFT breast wound after lumpectomy. She is 2 week post op. She was seen on Monday by Lashonda Graf NP because she had a lot of drainage over the weekend. Jolinda Bougie expelled some clotted blood and steristriped the open part of the wound. The dressing fell off Wednesday and she now changes her pad daily with minimal dark red drainage. Steristrips remain in place. Pain is intermittent and rates this at 2/10. Denies any other breast problems at this time.       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 0/4 nodes,  %. [place] 80%, Her 2 negative    1/24/17- 2ml old blood aspirated by Dr. Savage Paz  1/30/17 debrided by Lashonda Graf NP      ROS    Physical Exam   Pulmonary/Chest: Left breast exhibits skin change (the medial half of the wound is open. clotted blood visible in the wound). 1. I debrided the remainder of the clotted blood using gauze. 2. I closed the medial wound with 4-0 nylon suture x 2 stitches. Wound dressed with steristrips, gauze and tape. Keep wound covered as needed. Remove stitches in 2 weeks. ASSESSMENT and PLAN    ICD-10-CM ICD-9-CM    1. Breast wound, left, sequela S21.002S 906.0      1. Pt had a large hematoma which drained spontaneously. The wound opened. I put 2 stitches to re-approximate the wound to facilitate wound healing. 2. She had med onc appts in 2 weeks.

## 2017-02-02 NOTE — PATIENT INSTRUCTIONS
Breast Self-Exam: Care Instructions  Your Care Instructions  A breast self-exam is when you check your breasts for lumps or changes. This regular exam helps you learn how your breasts normally look and feel. Most breast problems or changes are not because of cancer. Breast self-exam is not a substitute for a mammogram. Having regular breast exams by your doctor and regular mammograms improve your chances of finding any problems with your breasts. Some women set a time each month to do a step-by-step breast self-exam. Other women like a less formal system. They might look at their breasts as they brush their teeth, or feel their breasts once in a while in the shower. If you notice a change in your breast, tell your doctor. Follow-up care is a key part of your treatment and safety. Be sure to make and go to all appointments, and call your doctor if you are having problems. Its also a good idea to know your test results and keep a list of the medicines you take. How do you do a breast self-exam?  · The best time to examine your breasts is usually one week after your menstrual period begins. Your breasts should not be tender then. If you do not have periods, you might do your exam on a day of the month that is easy to remember. · To examine your breasts:  ¨ Remove all your clothes above the waist and lie down. When you are lying down, your breast tissue spreads evenly over your chest wall, which makes it easier to feel all your breast tissue. ¨ Use the pads--not the fingertips--of the 3 middle fingers of your left hand to check your right breast. Move your fingers slowly in small coin-sized circles that overlap. ¨ Use three levels of pressure to feel of all your breast tissue. Use light pressure to feel the tissue close to the skin surface. Use medium pressure to feel a little deeper. Use firm pressure to feel your tissue close to your breastbone and ribs.  Use each pressure level to feel your breast tissue before moving on to the next spot. ¨ Check your entire breast, moving up and down as if following a strip from the collarbone to the bra line, and from the armpit to the ribs. Repeat until you have covered the entire breast.  ¨ Repeat this procedure for your left breast, using the pads of the 3 middle fingers of your right hand. · To examine your breasts while in the shower:  ¨ Place one arm over your head and lightly soap your breast on that side. ¨ Using the pads of your fingers, gently move your hand over your breast (in the strip pattern described above), feeling carefully for any lumps or changes. ¨ Repeat for the other breast.  · Have your doctor inspect anything you notice to see if you need further testing. Where can you learn more? Go to http://janina-viviana.info/. Enter P148 in the search box to learn more about \"Breast Self-Exam: Care Instructions. \"  Current as of: July 26, 2016  Content Version: 11.1  © 5711-8308 [employer]. Care instructions adapted under license by [employer] (which disclaims liability or warranty for this information). If you have questions about a medical condition or this instruction, always ask your healthcare professional. [place] [address] any warranty or liability for your use of this information.

## 2017-02-13 ENCOUNTER — OFFICE VISIT (OUTPATIENT)
Dept: ONCOLOGY | Age: 67
End: 2017-02-13

## 2017-02-13 VITALS
HEIGHT: 66 IN | DIASTOLIC BLOOD PRESSURE: 91 MMHG | SYSTOLIC BLOOD PRESSURE: 139 MMHG | TEMPERATURE: 98.1 F | OXYGEN SATURATION: 98 % | HEART RATE: 75 BPM | WEIGHT: 229 LBS | RESPIRATION RATE: 18 BRPM | BODY MASS INDEX: 36.8 KG/M2

## 2017-02-13 DIAGNOSIS — C50.912 MALIGNANT NEOPLASM OF LEFT FEMALE BREAST, UNSPECIFIED SITE OF BREAST: Primary | ICD-10-CM

## 2017-02-13 RX ORDER — LETROZOLE 2.5 MG/1
2.5 TABLET, FILM COATED ORAL DAILY
Qty: 90 TAB | Refills: 3 | Status: SHIPPED | OUTPATIENT
Start: 2017-02-13 | End: 2018-04-12 | Stop reason: SDUPTHER

## 2017-02-13 NOTE — PROGRESS NOTES
Jefe Mauricio is a 61 y.o. female here for follow up had concerns including New Patient and Breast Cancer. HIPAA verified by two patient identifiers. Ms. Zuleika Molina has a reminder for a \"due or due soon\" health maintenance. I have asked that she contact her primary care provider for follow-up on this health maintenance.

## 2017-02-13 NOTE — MR AVS SNAPSHOT
Visit Information Date & Time Provider Department Dept. Phone Encounter #  
 2/13/2017  3:00 PM Willy Smith MD [address] [facility] Oncology at [facility] [phone] [id number] Your Appointments 3/23/2017  9:20 AM  
Any with Tima Swain MD  
[address] [facility] ([facility] Appt Note: 1st adherence visit [address] [facility] [id number] [address] [facility] [address] Upcoming Health Maintenance Date Due Hepatitis C Screening 1950 Pneumococcal 19-64 Highest Risk (1 of 3 - PCV13) 6/22/1971 DTaP/Tdap/Td series (1 - Tdap) 6/22/1973 PAP AKA CERVICAL CYTOLOGY 6/22/1973 FOBT Q 1 YEAR AGE 50-75 6/22/2002 ZOSTER VACCINE AGE 60> 6/22/2012 INFLUENZA AGE 9 TO ADULT 8/1/2016 BREAST CANCER SCRN MAMMOGRAM 11/22/2018 Allergies as of 2/13/2017  Review Complete On: 2/13/2017 By: Jocelyn Cavazos LPN Severity Noted Reaction Type Reactions Other Medication  01/10/2017    Hives  
 holter monitor pads Current Immunizations  Reviewed on 2/13/2017 No immunizations on file. Reviewed by Jocelyn Cavazos LPN on 5/86/9820 at  3:09 PM  
You Were Diagnosed With   
  
 Codes Comments Malignant neoplasm of left female breast, unspecified [facility]    -  Primary ICD-10-CM: W99.468 ICD-9-CM: 174.9 Vitals BP Pulse Temp Resp Height(growth percentile) Weight(growth percentile) (!) 139/91 75 98.1 °F (36.7 °C) 18 5' 6\" (1.676 m) 229 lb (103.9 kg) SpO2 BMI OB Status Smoking Status 98% 36.96 kg/m2 Hysterectomy Never Smoker Vitals History BMI and BSA Data Body Mass Index Body Surface Area  
 36.96 kg/m 2 2.2 m 2 Preferred Pharmacy Pharmacy Name Phone [address] [facility] [phone] Your Updated Medication List  
  
   
 This list is accurate as of: 2/13/17  3:42 PM.  Always use your most recent med list.  
  
  
  
  
 ASPIR-LOW 81 mg tablet Generic drug:  aspirin delayed-release Takes irregularly,usually weekly DAILY MULTI-VITAMINS/IRON tablet Generic drug:  multivitamin with iron Take 1 Tab by mouth daily. HYDROcodone-acetaminophen 5-325 mg per tablet Commonly known as:  Sarah Leonard Take 1-2 Tabs by mouth every four (4) hours as needed for Pain. Max Daily Amount: 12 Tabs. letrozole 2.5 mg tablet Commonly known as:  [facility] Take 1 Tab by mouth daily. raNITIdine 150 mg tablet Commonly known as:  ZANTAC  
daily. simvastatin 20 mg tablet Commonly known as:  ZOCOR  
20 mg nightly. Prescriptions Printed Refills  
 letrozole (FEMARA) 2.5 mg tablet 3 Sig: Take 1 Tab by mouth daily. Class: Print Route: Oral  
  
We Performed the Following VITAMIN D, 25 HYDROXY [id number] CPT(R)] To-Do List   
 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 HER 2 unamplified. There are three Modalities for treatment of Breast Cancer 1. Surgery - you have already had a lumpectomy. 2. Chemotherapy and/or hormonal therapy- 
A) You will not need chemotherapy. B) You would benefit from hormonal therapy because your cancer was fueled by estrogen produced in the body. Even though you are post menopausal estrogen is still produced in the body. It is made by adrenal glands and under the skin (fat). Side Effects of the hormonal therapy are: fatigue, bone, joint aches and hot flashes 3. Radiation Therapy - You will start hormonal therapy after radiation is completed. A prescription for Letrozole 2.5 mg daily has been sent to your pharmacy. Start taking this after your radiation is complete.  Please call our office to let us know when you start. Letrozole (By mouth) Letrozole (LET-faith-zole) Treats breast cancer. Brand Name(s):Kahlil There may be other brand names for this medicine. When This Medicine Should Not Be Used: You should not use this medicine if you are pregnant, planning to become pregnant, or if you have not stopped menstruating (premenopausal). How to Use This Medicine:  
Tablet · Medicines used to treat cancer are very strong and can have many side effects. Before receiving this medicine, make sure you understand all the risks and benefits. It is important for you to work closely with your doctor during your treatment. · Your doctor will tell you how much medicine to use. Do not use more than directed. · You may take this medicine with or without food. If a dose is missed: · This medicine needs to be given on a fixed schedule. If you miss a dose, call your doctor for instructions. · If you vomit after taking your medicine, call your doctor or pharmacist for instructions. How to Store and Dispose of This Medicine: · Store the medicine in a closed container at room temperature, away from heat, moisture, and direct light. · Ask your pharmacist, doctor, or health caregiver about the best way to dispose of any leftover medicine after you have finished your treatment. You will also need to throw away old medicine after the expiration date has passed. · Keep all medicine out of the reach of children. Never share your medicine with anyone. Drugs and Foods to Avoid: Ask your doctor or pharmacist before using any other medicine, including over-the-counter medicines, vitamins, and herbal products. · Make sure your doctor knows if you are also using tamoxifen (Nolvadex®). Warnings While Using This Medicine: · It is not safe to take this medicine during pregnancy. It could harm an unborn baby. Tell your doctor right away if you become pregnant. · Make sure your doctor knows if you are breastfeeding, or if you have severe liver disease or cirrhosis, bone problems (such as osteoporosis), or high cholesterol in the blood. · This medicine may decrease bone mineral density when used for a long time. A low bone mineral density can cause weak bones or osteoporosis. If you have any questions about this, talk to your doctor. · This medicine may increase your cholesterol or fat in the blood. If this happens, your doctor may give you medicine to lower the cholesterol and fat. · This medicine may make you dizzy or drowsy. Avoid driving, using machines, or doing anything else that could be dangerous if you are not alert. · Your doctor will check your progress and the effects of this medicine at regular visits. Keep all appointments. Blood tests may be needed to check for unwanted effects. Possible Side Effects While Using This Medicine:  
Call your doctor right away if you notice any of these side effects: · Allergic reaction: Itching or hives, swelling in your face or hands, swelling or tingling in your mouth or throat, chest tightness, trouble breathing · Changes in vision. · Chest pain, shortness of breath, or coughing up blood. · Fast, irregular, or pounding heartbeat. · Fever, chills, cough, sore throat, and body aches. · Increase in how much or how often you urinate, painful urination. · Numbness or weakness in your arm or leg, or on one side of your body. · Pain in your lower leg (calf). · Severe bone, joint, or back pain. · Sudden or severe headache, problems with vision, speech, or walking. · Swelling in your ankles or feet. · Unusual bleeding or bruising. · Unusual tiredness or weakness. · Vaginal discharge, bleeding, or dryness. If you notice these less serious side effects, talk with your doctor: · Breast pain. · Changes in appetite. · Diarrhea, constipation, nausea, vomiting, or stomach pain. · Dizziness. · Hot flashes or increased sweating. · Mild headache. · Mild joint, back, or muscle pain. · Skin rash. · Trouble sleeping. · Warmth or redness in your face, neck, arms, or upper chest. 
· Weight gain or loss. If you notice other side effects that you think are caused by this medicine, tell your doctor. Call your doctor for medical advice about side effects. You may report side effects to FDA at 7-232-CYH-3974 © 2016 [address] is for End User's use only and may not be sold, redistributed or otherwise used for commercial purposes. The above information is an  only. It is not intended as medical advice for individual conditions or treatments. Talk to your doctor, nurse or pharmacist before following any medical regimen to see if it is safe and effective for you. Introducing [facility]! [employer] introduces Powered Outcomes patient portal. Now you can access parts of your medical record, email your doctor's office, and request medication refills online. 1. In your internet browser, go to [email or web address] 2. Click on the First Time User? Click Here link in the Sign In box. You will see the New Member Sign Up page. 3. Enter your Powered Outcomes Access Code exactly as it appears below. You will not need to use this code after youve completed the sign-up process. If you do not sign up before the expiration date, you must request a new code. · Powered Outcomes Access Code: [email or web address] Expires: 4/26/2017 11:48 AM 
 
4. Enter the last four digits of your Social Security Number (xxxx) and Date of Birth (mm/dd/yyyy) as indicated and click Submit. You will be taken to the next sign-up page. 5. Create a [employer] ID. This will be your Powered Outcomes login ID and cannot be changed, so think of one that is secure and easy to remember. 6. Create a [employer] password. You can change your password at any time. 7. Enter your Password Reset Question and Answer. This can be used at a later time if you forget your password. 8. Enter your e-mail address. You will receive e-mail notification when new information is available in [address] 9. Click Sign Up. You can now view and download portions of your medical record. 10. Click the Download Summary menu link to download a portable copy of your medical information. If you have questions, please visit the Frequently Asked Questions section of the [employer] website. Remember, [employer] is NOT to be used for urgent needs. For medical emergencies, dial 911. Now available from your iPhone and Android! Please provide this summary of care documentation to your next provider. Your primary care clinician is listed as Tony Kunz. If you have any questions after today's visit, please call [phone].

## 2017-02-13 NOTE — ACP (ADVANCE CARE PLANNING)
Patient received ACP and encouraged to  fill out and call our  Jackie Rossi with questions or concerns.

## 2017-02-13 NOTE — PATIENT INSTRUCTIONS
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 Estrogen receptor positive and progesterone receptor positive. HER 2 unamplified. There are three Modalities for treatment of Breast Cancer    1. Surgery - you have already had a lumpectomy. 2. Chemotherapy and/or hormonal therapy-  A) You will not need chemotherapy. B) You would benefit from hormonal therapy because your cancer was fueled by estrogen produced in the body. Even though you are post menopausal estrogen is still produced in the body. It is made by adrenal glands and under the skin (fat). Side Effects of the hormonal therapy are: fatigue, bone, joint aches and hot flashes      3. Radiation Therapy - You will start hormonal therapy after radiation is completed. A prescription for Letrozole 2.5 mg daily has been sent to your pharmacy. Start taking this after your radiation is complete. Please call our office to let us know when you start. Letrozole (By mouth)   Letrozole (LET-faith-zole)  Treats breast cancer. Brand Name(s):Femara   There may be other brand names for this medicine. When This Medicine Should Not Be Used: You should not use this medicine if you are pregnant, planning to become pregnant, or if you have not stopped menstruating (premenopausal). How to Use This Medicine:   Tablet  · Medicines used to treat cancer are very strong and can have many side effects. Before receiving this medicine, make sure you understand all the risks and benefits. It is important for you to work closely with your doctor during your treatment. · Your doctor will tell you how much medicine to use. Do not use more than directed. · You may take this medicine with or without food. If a dose is missed:   · This medicine needs to be given on a fixed schedule. If you miss a dose, call your doctor for instructions. · If you vomit after taking your medicine, call your doctor or pharmacist for instructions.   How to Store and Dispose of This Medicine: · Store the medicine in a closed container at room temperature, away from heat, moisture, and direct light. · Ask your pharmacist, doctor, or health caregiver about the best way to dispose of any leftover medicine after you have finished your treatment. You will also need to throw away old medicine after the expiration date has passed. · Keep all medicine out of the reach of children. Never share your medicine with anyone. Drugs and Foods to Avoid:   Ask your doctor or pharmacist before using any other medicine, including over-the-counter medicines, vitamins, and herbal products. · Make sure your doctor knows if you are also using tamoxifen (Nolvadex®). Warnings While Using This Medicine:   · It is not safe to take this medicine during pregnancy. It could harm an unborn baby. Tell your doctor right away if you become pregnant. · Make sure your doctor knows if you are breastfeeding, or if you have severe liver disease or cirrhosis, bone problems (such as osteoporosis), or high cholesterol in the blood. · This medicine may decrease bone mineral density when used for a long time. A low bone mineral density can cause weak bones or osteoporosis. If you have any questions about this, talk to your doctor. · This medicine may increase your cholesterol or fat in the blood. If this happens, your doctor may give you medicine to lower the cholesterol and fat. · This medicine may make you dizzy or drowsy. Avoid driving, using machines, or doing anything else that could be dangerous if you are not alert. · Your doctor will check your progress and the effects of this medicine at regular visits. Keep all appointments. Blood tests may be needed to check for unwanted effects.   Possible Side Effects While Using This Medicine:   Call your doctor right away if you notice any of these side effects:  · Allergic reaction: Itching or hives, swelling in your face or hands, swelling or tingling in your mouth or throat, chest tightness, trouble breathing  · Changes in vision. · Chest pain, shortness of breath, or coughing up blood. · Fast, irregular, or pounding heartbeat. · Fever, chills, cough, sore throat, and body aches. · Increase in how much or how often you urinate, painful urination. · Numbness or weakness in your arm or leg, or on one side of your body. · Pain in your lower leg (calf). · Severe bone, joint, or back pain. · Sudden or severe headache, problems with vision, speech, or walking. · Swelling in your ankles or feet. · Unusual bleeding or bruising. · Unusual tiredness or weakness. · Vaginal discharge, bleeding, or dryness. If you notice these less serious side effects, talk with your doctor:   · Breast pain. · Changes in appetite. · Diarrhea, constipation, nausea, vomiting, or stomach pain. · Dizziness. · Hot flashes or increased sweating. · Mild headache. · Mild joint, back, or muscle pain. · Skin rash. · Trouble sleeping. · Warmth or redness in your face, neck, arms, or upper chest.  · Weight gain or loss. If you notice other side effects that you think are caused by this medicine, tell your doctor. Call your doctor for medical advice about side effects. You may report side effects to FDA at 1-821-FDA-0715  © 2016 [address] is for End User's use only and may not be sold, redistributed or otherwise used for commercial purposes. The above information is an  only. It is not intended as medical advice for individual conditions or treatments. Talk to your doctor, nurse or pharmacist before following any medical regimen to see if it is safe and effective for you.

## 2017-02-14 ENCOUNTER — DOCUMENTATION ONLY (OUTPATIENT)
Dept: SLEEP MEDICINE | Age: 67
End: 2017-02-14

## 2017-02-14 NOTE — PROGRESS NOTES
PAP order was re sent to [facility] on 2/14/17 due to [facility] not accepting patient's insurance

## 2017-02-20 ENCOUNTER — OFFICE VISIT (OUTPATIENT)
Dept: SURGERY | Age: 67
End: 2017-02-20

## 2017-02-20 ENCOUNTER — HOSPITAL ENCOUNTER (OUTPATIENT)
Dept: BONE DENSITY | Age: 67
Discharge: HOME | End: 2017-02-20
Attending: INTERNAL MEDICINE
Payer: OTHER GOVERNMENT

## 2017-02-20 VITALS
WEIGHT: 231 LBS | BODY MASS INDEX: 37.12 KG/M2 | HEART RATE: 88 BPM | HEIGHT: 66 IN | SYSTOLIC BLOOD PRESSURE: 146 MMHG | DIASTOLIC BLOOD PRESSURE: 88 MMHG

## 2017-02-20 DIAGNOSIS — C50.912 MALIGNANT NEOPLASM OF LEFT FEMALE BREAST, UNSPECIFIED SITE OF BREAST: ICD-10-CM

## 2017-02-20 DIAGNOSIS — Z98.890 S/P LUMPECTOMY, LEFT BREAST: ICD-10-CM

## 2017-02-20 DIAGNOSIS — C50.412 BREAST CANCER OF UPPER-OUTER QUADRANT OF LEFT FEMALE BREAST (HCC): Primary | ICD-10-CM

## 2017-02-20 DIAGNOSIS — S21.002S BREAST WOUND, LEFT, SEQUELA: ICD-10-CM

## 2017-02-20 DIAGNOSIS — T14.8XXA HEMATOMA: ICD-10-CM

## 2017-02-20 PROCEDURE — 77080 DXA BONE DENSITY AXIAL: CPT

## 2017-02-20 NOTE — PROGRESS NOTES
HISTORY OF PRESENT ILLNESS  Mirna Diaz is a 61 y.o. female. HPI   ESTABLISHED patient here today for LEFT breast stitch removal. She continues to have drainage from wound site. The drainage is yellow-tinged with specks of red and has decreased in amount. She places gauze over the area and changes this twice a day. She has soreness to the area as well. Denies any other breast problems at this time. She has met with radiation oncologist, Dr. Cristhian Garcia, and is waiting for wound to heal to start radiation. She has also met with medical oncologist, Dr. Rommel Irby, and will start Letrozole after completing radiation.       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  DC 80%, Her 2 negative  12/2016 LEFT lumpectomy, [facility]  1/24/17- 2ml old blood aspirated by Dr. Meghana Martínez  1/30/17- debridement  2/2/17- clot debridement with sutures placed    ROS    Physical Exam    ASSESSMENT and PLAN  {ASSESSMENT/PLAN:[id number]}

## 2017-02-20 NOTE — PROGRESS NOTES
HISTORY OF PRESENT ILLNESS  Jesse Jose is a 61 y.o. female. HPI  ESTABLISHED patient here today for LEFT breast stitch removal. She continues to have drainage from wound site. The drainage is yellow-tinged with specks of red and has decreased in amount. She places gauze over the area and changes this twice a day. She has soreness to the area as well. Denies any other breast problems at this time. She has met with radiation oncologist, Dr. Lucy Patel, and is waiting for wound to heal to start radiation. She has also met with medical oncologist, Dr. Erma Serrano, and will start Letrozole after completing radiation.       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 NV 80%, Her 2 negative  12/2016 LEFT lumpectomy, [facility]  1/24/17- 2ml old blood aspirated by Dr. Severo Barrera  1/30/17- debridement  2/2/17- clot debridement with sutures placed  ROS    Physical Exam   Pulmonary/Chest:         Visit Vitals    /88    Pulse 88    Ht 5' 6\" (1.676 m)    Wt 231 lb (104.8 kg)    BMI 37.28 kg/m2     ASSESSMENT and PLAN  Encounter Diagnoses   Name Primary?  Breast cancer of upper-outer quadrant of left female breast ([facility] Yes    S/P lumpectomy, left breast     Hematoma     Breast wound, left, sequela      Left breast wound - sutures loose and removed. Medial edge of surgical incision not fully healed and a steristrip was placed. Minimal serous drainage noted. No s/sx of infection. There is no evidence of a recurring hematoma and the area around the incision is concave. The patient will leave the steristrip on until it falls off. She will continue to cover the area with guaze. She has follow-up with rad onc and will RTC here after her radiation treatment or sooner PRN. She is comfortable with this plan. All questions answered and she stated understanding.

## 2017-02-20 NOTE — PATIENT INSTRUCTIONS
Breast Self-Exam: Care Instructions  Your Care Instructions  A breast self-exam is when you check your breasts for lumps or changes. This regular exam helps you learn how your breasts normally look and feel. Most breast problems or changes are not because of cancer. Breast self-exam is not a substitute for a mammogram. Having regular breast exams by your doctor and regular mammograms improve your chances of finding any problems with your breasts. Some women set a time each month to do a step-by-step breast self-exam. Other women like a less formal system. They might look at their breasts as they brush their teeth, or feel their breasts once in a while in the shower. If you notice a change in your breast, tell your doctor. Follow-up care is a key part of your treatment and safety. Be sure to make and go to all appointments, and call your doctor if you are having problems. Its also a good idea to know your test results and keep a list of the medicines you take. How do you do a breast self-exam?  · The best time to examine your breasts is usually one week after your menstrual period begins. Your breasts should not be tender then. If you do not have periods, you might do your exam on a day of the month that is easy to remember. · To examine your breasts:  ¨ Remove all your clothes above the waist and lie down. When you are lying down, your breast tissue spreads evenly over your chest wall, which makes it easier to feel all your breast tissue. ¨ Use the pads--not the fingertips--of the 3 middle fingers of your left hand to check your right breast. Move your fingers slowly in small coin-sized circles that overlap. ¨ Use three levels of pressure to feel of all your breast tissue. Use light pressure to feel the tissue close to the skin surface. Use medium pressure to feel a little deeper. Use firm pressure to feel your tissue close to your breastbone and ribs.  Use each pressure level to feel your breast tissue before moving on to the next spot. ¨ Check your entire breast, moving up and down as if following a strip from the collarbone to the bra line, and from the armpit to the ribs. Repeat until you have covered the entire breast.  ¨ Repeat this procedure for your left breast, using the pads of the 3 middle fingers of your right hand. · To examine your breasts while in the shower:  ¨ Place one arm over your head and lightly soap your breast on that side. ¨ Using the pads of your fingers, gently move your hand over your breast (in the strip pattern described above), feeling carefully for any lumps or changes. ¨ Repeat for the other breast.  · Have your doctor inspect anything you notice to see if you need further testing. Where can you learn more? Go to http://janina-viviana.info/. Enter P148 in the search box to learn more about \"Breast Self-Exam: Care Instructions. \"  Current as of: July 26, 2016  Content Version: 11.1  © 5714-1340 [employer]. Care instructions adapted under license by [employer] (which disclaims liability or warranty for this information). If you have questions about a medical condition or this instruction, always ask your healthcare professional. [place] [address] any warranty or liability for your use of this information.

## 2017-02-22 ENCOUNTER — DOCUMENTATION ONLY (OUTPATIENT)
Dept: SLEEP MEDICINE | Age: 67
End: 2017-02-22

## 2017-02-22 NOTE — PROGRESS NOTES
STAT PAP order was emailed to [facility] due to the company accepting patient's current insurance and future insurance plan.

## 2017-02-26 NOTE — PROGRESS NOTES
Oncology Consultation        Patient: Aminta Horta MRN: [id number]  SSN: [id number]    YOB: 1950  Age: 61 y.o. Sex: female      Subjective:      Aminta Horta is a 61 y.o. female who I am seeing on request from Dr. Juan Oviedo for a new diagnosis of left sided breast carcinoma. She underwent a screening mammogram and was noted to have an abnormality in the left breast. She underwent biopsy which showed mucinous carcinoma which is ER and VT +ve. She was seen by Dr. Juan Oviedo and then underwent left breast lumpectomy on 01/18/2017. She is doing well.            Review of Systems:    Constitutional: negative  Eyes: negative  Ears, Nose, Mouth, Throat, and Face: negative  Respiratory: negative  Cardiovascular: negative  Gastrointestinal: negative  Genitourinary:negative  Integument/Breast: negative  Hematologic/Lymphatic: negative  Musculoskeletal:negative  Neurological: negative        Past Medical History:   Diagnosis Date    Cancer ([facility] [address] 3753,5954     breast cancer right,left breast    Diabetes ([facility] [address]     GERD (gastroesophageal reflux disease)     Hypercholesterolemia     Nausea & vomiting     Sleep apnea     possible, to have sleep study 1/26/2017    Stroke [facility])      Past Surgical History:   Procedure Laterality Date    BREAST SURGERY PROCEDURE UNLISTED  1991    right mastectomy,lymph node biopsy    HX BREAST BIOPSY Left 2016    stereotatic    HX BREAST LUMPECTOMY Left 1/18/2017    LEFT BREAST LUMPECTOMY AND LEFT BREAST SENTINEL NODE BIOPSY WITH BLUE DYE performed by Samuel Peña MD at [address] HX GYN  1999    hysterectomy      Family History   Problem Relation Age of Onset    Breast Cancer Mother     Dementia Mother     Heart Disease Father     Cancer Sister      pancreatic     Social History   Substance Use Topics    Smoking status: Never Smoker    Smokeless tobacco: Not on file    Alcohol use No      Prior to Admission medications    Medication Sig Start Date End Date Taking? Authorizing Provider   letrozole [facility]) 2.5 mg tablet Take 1 Tab by mouth daily. 2/13/17  Yes Margo Jordan MD   ASPIR-LOW 81 mg tablet Takes irregularly,usually weekly 11/1/16  Yes Historical Provider   raNITIdine (ZANTAC) 150 mg tablet daily. 11/17/16  Yes Historical Provider   simvastatin (ZOCOR) 20 mg tablet 20 mg nightly. 11/1/16  Yes Historical Provider   multivitamin with iron (DAILY MULTI-VITAMINS/IRON) tablet Take 1 Tab by mouth daily. Yes Historical Provider              Allergies   Allergen Reactions    Other Medication Hives     holter monitor pads           Objective:     Vitals:    02/13/17 1508   BP: (!) 139/91   Pulse: 75   Resp: 18   Temp: 98.1 °F (36.7 °C)   SpO2: 98%   Weight: 229 lb (103.9 kg)   Height: 5' 6\" (1.676 m)            Physical Exam:    GENERAL: alert, cooperative  EYE: negative  LYMPHATIC: Cervical, supraclavicular, and axillary nodes normal.   THROAT & NECK: normal and no erythema or exudates noted. LUNG: clear to auscultation bilaterally  HEART: regular rate and rhythm  ABDOMEN: soft, non-tender  EXTREMITIES: no cyanosis or edema  SKIN: Normal.  NEUROLOGIC: negative            Assessment:     1. Left breast carcinoma:  pT1b N0 M0 (Stage IA) mucinous carcinoma, Tumor size 1 cm, LN -ve, grade 2, %, RI 80%, Her 2 -ve    ECOG PS 0  Intent of treatment - curative    S/P left sided lumpectomy 01/18/2017       Patient sent for consideration of adjuvant therapy. I spent significant time in explaining the rationale of adjuvant hormonal therapy. The tumor is of mucinous histology, highly ER +ve. There will not be a significant benefit from use of adjuvant chemotherapy. She will however benefit from adjuvant hormonal therapy. I counseled the patient regarding the hormonal therapy. Discussions included side-effect and benefit of hormonal therapy.   She understood the expected side-effect which includes hot flashes, myalgias/arthralgias, osteopenia/osteoporosis with aromatase inhibitor. She agrees to take Letrozole. She understands the alternative to this is observation alone. I spent 90 minute with the patient in a face-to-face encounter. I explained her the stage of the disease, pathophysiology of the disease and the treatment approaches. I answered all her questions. More than 50% of the time was utilized in education, counseling and co-ordination of care. The patient's emotional well being was addressed during this office visit and patient seems to be coping well with the diagnosis and the treatment. Plan:       1. Complete radiation therapy to the left breast  2. Start Letrozole after completion of radiation therapy  3. Return in 3 months  4. Vit D level  5. DEXA scan        Signed By: Herbert Skinner MD     February 26, 2017             Lexie Bansal MD  CC.  Amna Enrique MD

## 2017-03-02 ENCOUNTER — DOCUMENTATION ONLY (OUTPATIENT)
Dept: SLEEP MEDICINE | Age: 67
End: 2017-03-02

## 2017-03-17 ENCOUNTER — OFFICE VISIT (OUTPATIENT)
Dept: SURGERY | Age: 67
End: 2017-03-17

## 2017-03-17 VITALS
BODY MASS INDEX: 37.12 KG/M2 | HEIGHT: 66 IN | WEIGHT: 231 LBS | HEART RATE: 68 BPM | DIASTOLIC BLOOD PRESSURE: 90 MMHG | SYSTOLIC BLOOD PRESSURE: 140 MMHG

## 2017-03-17 DIAGNOSIS — C50.412 BREAST CANCER OF UPPER-OUTER QUADRANT OF LEFT FEMALE BREAST (HCC): Primary | ICD-10-CM

## 2017-03-17 DIAGNOSIS — S21.002S BREAST WOUND, LEFT, SEQUELA: ICD-10-CM

## 2017-03-17 NOTE — PROGRESS NOTES
HISTORY OF PRESENT ILLNESS  Sherren Nim is a 61 y.o. female. HPI  ESTABLISHED patient here today for evaluation of LEFT breast granuloma. She was sent here by Dr. Katy Turner to evaluate granuloma prior to starting radiation on 3/22/17. She has met with radiation oncologist, Dr. Katy Turner, and should start radiation on Wednesday, 2017. She has also met with medical oncologist, Dr. Eli Jurado, and will start Letrozole after completing radiation.       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 NV 80%, Her 2 negative  2016 LEFT lumpectomy, SLNB  17- 2ml old blood aspirated by Dr. Scot Crandall  17- debridement  17- clot debridement with sutures placed    Family history: Mother had breast cancer in her 66's, survived. Sister  from pancreatic cancer age 54  ROS    Physical Exam   Pulmonary/Chest:       Left breast wound debrided with damp gauze. Granulation tissue removed. Small opening at superior end of surgical incision. Steristrip placed and gauze bandage placed over that. Visit Vitals    /90    Pulse 68    Ht 5' 6\" (1.676 m)    Wt 231 lb (104.8 kg)    BMI 37.28 kg/m2     ASSESSMENT and PLAN  Encounter Diagnoses   Name Primary?  Breast cancer of upper-outer quadrant of left female breast ([facility] [address] Yes    Breast wound, left, sequela      Left breast small granuloma - area debrided without difficulty. Would care reviewed with patient. Patient has follow-up with Dr. Teresa Weathers office and will contact them about a start date for radiation. Her wound should be healed by next week and should not delay her starting radiation (tentively set for 3/22/17).

## 2017-03-17 NOTE — PROGRESS NOTES
HISTORY OF PRESENT ILLNESS  Cori French is a 61 y.o. female.   HPI       ROS    Physical Exam    ASSESSMENT and PLAN  {ASSESSMENT/PLAN:16874}

## 2017-03-17 NOTE — MR AVS SNAPSHOT
Visit Information Date & Time Provider Department Dept. Phone Encounter #  
 3/17/2017 10:30 AM Mahesh Fowler NP [facility] [id number] [id number] Follow-up Instructions Routing History Follow-up and Disposition History Your Appointments 3/23/2017  9:20 AM  
Any with Alannah Torres MD  
[address] ([facility]) Appt Note: 1st adherence visit [address] [phone] [address] [place] [address] [address] [address] 5/15/2017 10:30 AM  
Follow Up with Mj Ruiz MD  
[address] [facility]cology at [facility]) Appt Note: 3 month follow up  
 [address] [facility] [address] [id number]  
[address] [facility] [address] Reagan Crawford Upcoming Health Maintenance Date Due Hepatitis C Screening 1950 Pneumococcal 19-64 Highest Risk (1 of 3 - PCV13) 6/22/1971 DTaP/Tdap/Td series (1 - Tdap) 6/22/1973 PAP AKA CERVICAL CYTOLOGY 6/22/1973 FOBT Q 1 YEAR AGE 50-75 6/22/2002 ZOSTER VACCINE AGE 60> 6/22/2012 INFLUENZA AGE 9 TO ADULT 8/1/2016 BREAST CANCER SCRN MAMMOGRAM 11/22/2018 Allergies as of 3/17/2017  Review Complete On: 3/17/2017 By: Mahesh Fowler NP Severity Noted Reaction Type Reactions Other Medication  01/10/2017    Hives  
 holter monitor pads Current Immunizations  Reviewed on 2/13/2017 No immunizations on file. Not reviewed this visit You Were Diagnosed With   
  
 Codes Comments Breast cancer of upper-outer quadrant of left female breast ([facility] [address]    -  Primary ICD-10-CM: Q48.056 ICD-9-CM: 174.4 Breast wound, left, sequela     ICD-10-CM: S21.002S ICD-9-CM: 769. 0 Vitals BP Pulse Height(growth percentile) Weight(growth percentile) BMI OB Status 140/90 68 5' 6\" (1.676 m) 231 lb (104.8 kg) 37.28 kg/m2 Hysterectomy Smoking Status Never Smoker BMI and BSA Data Body Mass Index Body Surface Area  
 37.28 kg/m 2 2.21 m 2 Preferred Pharmacy Pharmacy Name Phone Jennifer Sawyer [phone] Your Updated Medication List  
  
   
This list is accurate as of: 3/17/17 11:26 AM.  Always use your most recent med list.  
  
  
  
  
 ASPIR-LOW 81 mg tablet Generic drug:  aspirin delayed-release Takes irregularly,usually weekly DAILY MULTI-VITAMINS/IRON tablet Generic drug:  multivitamin with iron Take 1 Tab by mouth daily. letrozole 2.5 mg tablet Commonly known as:  [facility] Take 1 Tab by mouth daily. raNITIdine 150 mg tablet Commonly known as:  ZANTAC  
daily. simvastatin 20 mg tablet Commonly known as:  ZOCOR  
20 mg nightly. Introducing [facility]! Melanie Luna introduces meinKauf patient portal. Now you can access parts of your medical record, email your doctor's office, and request medication refills online. 1. In your internet browser, go to [email or web address] 2. Click on the First Time User? Click Here link in the Sign In box. You will see the New Member Sign Up page. 3. Enter your meinKauf Access Code exactly as it appears below. You will not need to use this code after youve completed the sign-up process. If you do not sign up before the expiration date, you must request a new code. · meinKauf Access Code: [email or web address] Expires: 4/26/2017 12:48 PM 
 
4. Enter the last four digits of your Social Security Number (xxxx) and Date of Birth (mm/dd/yyyy) as indicated and click Submit. You will be taken to the next sign-up page. 5. Create a meinKauf ID. This will be your meinKauf login ID and cannot be changed, so think of one that is secure and easy to remember. 6. Create a Banno password. You can change your password at any time. 7. Enter your Password Reset Question and Answer. This can be used at a later time if you forget your password. 8. Enter your e-mail address. You will receive e-mail notification when new information is available in [address] 9. Click Sign Up. You can now view and download portions of your medical record. 10. Click the Download Summary menu link to download a portable copy of your medical information. If you have questions, please visit the Frequently Asked Questions section of the Banno website. Remember, Banno is NOT to be used for urgent needs. For medical emergencies, dial 911. Now available from your iPhone and Android! Please provide this summary of care documentation to your next provider. Your primary care clinician is listed as [employer] Prom. If you have any questions after today's visit, please call [phone].

## 2017-03-17 NOTE — LETTER
3/17/2017 10:55 AM 
 
Patient:  Cherelle Espinosa YOB: 1950 Date of Visit: 3/17/2017 Dear Kelly Merino MD 
[facility] [address] [id number] VIA Facsimile: [phone] 
 : Thank you for referring Ms. Cherelle Espinosa to me for evaluation/treatment. Below are the relevant portions of my assessment and plan of care. ASSESSMENT and PLAN Encounter Diagnoses Name Primary?  Breast cancer of upper-outer quadrant of left female breast ([facility] [address] Yes  Breast wound, left, sequela Left breast small granuloma - area debrided without difficulty. Wound care reviewed with patient. Patient has follow-up with Dr. Alannah Roche office and will contact them about a start date for radiation. Her wound should be healed by next week and should not delay her starting radiation (tentively set for 3/22/17). If you have questions, please do not hesitate to call me. I look forward to following Ms. Marianela Ibanez along with you. Sincerely, Chema Tang NP

## 2017-05-11 ENCOUNTER — OFFICE VISIT (OUTPATIENT)
Dept: SLEEP MEDICINE | Age: 67
End: 2017-05-11

## 2017-05-11 VITALS
WEIGHT: 240 LBS | SYSTOLIC BLOOD PRESSURE: 133 MMHG | DIASTOLIC BLOOD PRESSURE: 85 MMHG | BODY MASS INDEX: 38.57 KG/M2 | HEIGHT: 66 IN | HEART RATE: 77 BPM | OXYGEN SATURATION: 94 %

## 2017-05-11 DIAGNOSIS — G47.33 OBSTRUCTIVE SLEEP APNEA (ADULT) (PEDIATRIC): Primary | ICD-10-CM

## 2017-05-11 DIAGNOSIS — E66.9 OBESITY (BMI 30-39.9): ICD-10-CM

## 2017-05-11 RX ORDER — CHOLECALCIFEROL (VITAMIN D3) 25 MCG
TABLET ORAL
COMMUNITY
Start: 2017-03-23 | End: 2017-05-15 | Stop reason: ALTCHOICE

## 2017-05-11 NOTE — PATIENT INSTRUCTIONS
[address] [facility] [address] [place], [address]  Tel.  [phone]  Fax. 100 [facility] 60  [place], [address] [facility]  Tel.  [phone]  Fax. [phone] [address]  Tel.  [phone]  Fax. [phone]     Sleep Apnea: After Your Visit  Your Care Instructions  Sleep apnea occurs when you frequently stop breathing for 10 seconds or longer during sleep. It can be mild to severe, based on the number of times per hour that you stop breathing or have slowed breathing. Blocked or narrowed airways in your nose, mouth, or throat can cause sleep apnea. Your airway can become blocked when your throat muscles and tongue relax during sleep. Sleep apnea is common, occurring in 1 out of 20 individuals. Individuals having any of the following characteristics should be evaluated and treated right away due to high risk and detrimental consequences from untreated sleep apnea:  1. Obesity  2. Congestive Heart failure  3. Atrial Fibrillation  4. Uncontrolled Hypertension  5. Type II Diabetes  6. Night-time Arrhythmias  7. Stroke  8. Pulmonary Hypertension  9. High-risk Driving Populations (pilots, truck drivers, etc.)  10. Patients Considering Weight-loss Surgery    How do you know you have sleep apnea? You probably have sleep apnea if you answer 'yes' to 3 or more of the following questions:  S - Have you been told that you Snore? T - Are you often Tired during the day? O - Has anyone Observed you stop breathing while sleeping? P- Do you have (or are being treated for) high blood Pressure? B - Are you obese (Body Mass Index > 35)? A - Is your Age 48years old or older? N - Is your Neck size greater than 16 inches? G - Are you male Gender? A sleep physician can prescribe a breathing device that prevents tissues in the throat from blocking your airway.  Or your doctor may recommend using a dental device (oral breathing device) to help keep your airway open. In some cases, surgery may be needed to remove enlarged tissues in the throat. Follow-up care is a key part of your treatment and safety. Be sure to make and go to all appointments, and call your doctor if you are having problems. It's also a good idea to know your test results and keep a list of the medicines you take. How can you care for yourself at home? · Lose weight, if needed. It may reduce the number of times you stop breathing or have slowed breathing. · Go to bed at the same time every night. · Sleep on your side. It may stop mild apnea. If you tend to roll onto your back, sew a pocket in the back of your pajama top. Put a tennis ball into the pocket, and stitch the pocket shut. This will help keep you from sleeping on your back. · Avoid alcohol and medicines such as sleeping pills and sedatives before bed. · Do not smoke. Smoking can make sleep apnea worse. If you need help quitting, talk to your doctor about stop-smoking programs and medicines. These can increase your chances of quitting for good. · Prop up the head of your bed 4 to 6 inches by putting bricks under the legs of the bed. · Treat breathing problems, such as a stuffy nose, caused by a cold or allergies. · Use a continuous positive airway pressure (CPAP) breathing machine if lifestyle changes do not help your apnea and your doctor recommends it. The machine keeps your airway from closing when you sleep. · If CPAP does not help you, ask your doctor whether you should try other breathing machines. A bilevel positive airway pressure machine has two types of air pressureâone for breathing in and one for breathing out. Another device raises or lowers air pressure as needed while you breathe. · If your nose feels dry or bleeds when using one of these machines, talk with your doctor about increasing moisture in the air. A humidifier may help.   · If your nose is runny or stuffy from using a breathing machine, talk with your doctor about using decongestants or a corticosteroid nasal spray. When should you call for help? Watch closely for changes in your health, and be sure to contact your doctor if:  · You still have sleep apnea even though you have made lifestyle changes. · You are thinking of trying a device such as CPAP. · You are having problems using a CPAP or similar machine. Where can you learn more? Go to Unified Office. Enter N000 in the search box to learn more about \"Sleep Apnea: After Your Visit. \"   © 5737-2002 Healthwise, Incorporated. Care instructions adapted under license by Jenny Lawson (which disclaims liability or warranty for this information). This care instruction is for use with your licensed healthcare professional. If you have questions about a medical condition or this instruction, always ask your healthcare professional. Althea Link any warranty or liability for your use of this information. PROPER SLEEP HYGIENE    What to avoid  · Do not have drinks with caffeine, such as coffee or black tea, for 8 hours before bed. · Do not smoke or use other types of tobacco near bedtime. Nicotine is a stimulant and can keep you awake. · Avoid drinking alcohol late in the evening, because it can cause you to wake in the middle of the night. · Do not eat a big meal close to bedtime. If you are hungry, eat a light snack. · Do not drink a lot of water close to bedtime, because the need to urinate may wake you up during the night. · Do not read or watch TV in bed. Use the bed only for sleeping and sexual activity. What to try  · Go to bed at the same time every night, and wake up at the same time every morning. Do not take naps during the day. · Keep your bedroom quiet, dark, and cool. · Get regular exercise, but not within 3 to 4 hours of your bedtime. .  · Sleep on a comfortable pillow and mattress.   · If watching the clock makes you anxious, turn it facing away from you so you cannot see the time. · If you worry when you lie down, start a worry book. Well before bedtime, write down your worries, and then set the book and your concerns aside. · Try meditation or other relaxation techniques before you go to bed. · If you cannot fall asleep, get up and go to another room until you feel sleepy. Do something relaxing. Repeat your bedtime routine before you go to bed again. · Make your house quiet and calm about an hour before bedtime. Turn down the lights, turn off the TV, log off the computer, and turn down the volume on music. This can help you relax after a busy day. Drowsy Driving  The [address] [place] [address] Road Traffic Safety Administration cites drowsiness as a causing factor in more than 990,389 police reported crashes annually, resulting in 76,000 injuries and 1,500 deaths. Other surveys suggest 55% of people polled have driven while drowsy in the past year, 23% had fallen asleep but not crashed, 3% crashed, and 2% had and accident due to drowsy driving. Who is at risk? Young Drivers: One study of drowsy driving accidents states that 55% of the drivers were under 25 years. Of those, 75% were male. Shift Workers and Travelers: People who work overnight or travel across time zones frequently are at higher risk of experiencing Circadian Rhythm Disorders. They are trying to work and function when their body is programed to sleep. Sleep Deprived: Lack of sleep has a serious impact on your ability to pay attention or focus on a task. Consistently getting less than the average of 8 hours your body needs creates partial or cumulative sleep deprivation. Untreated Sleep Disorders: Sleep Apnea, Narcolepsy, R.L.S., and other sleep disorders (untreated) prevent a person from getting enough restful sleep. This leads to excessive daytime sleepiness and increases the risk for drowsy driving accidents by up to 7 times.   Medications / Alcohol: Even over the counter medications can cause drowsiness. Medications that impair a drivers attention should have a warning label. Alcohol naturally makes you sleepy and on its own can cause accidents. Combined with excessive drowsiness its effects are amplified. Signs of Drowsy Driving:   * You don't remember driving the last few miles   * You may drift out of your angelito   * You are unable to focus and your thoughts wander   * You may yawn more often than normal   * You have difficulty keeping your eyes open / nodding off   * Missing traffic signs, speeding, or tailgating  Prevention-   Good sleep hygiene, lifestyle and behavioral choices have the most impact on drowsy driving. There is no substitute for sleep and the average person requires 8 hours nightly. If you find yourself driving drowsy, stop and sleep. Consider the sleep hygiene tips provided during your visit as well. Medication Refill Policy: Refills for all medications require 1 week advance notice. Please have your pharmacy fax a refill request. We are unable to fax, or call in \"controled substance\" medications and you will need to pick these prescriptions up from our office. Smart Cube Activation    Thank you for requesting access to Smart Cube. Please follow the instructions below to securely access and download your online medical record. Smart Cube allows you to send messages to your doctor, view your test results, renew your prescriptions, schedule appointments, and more. How Do I Sign Up? 1. In your internet browser, go to [email or web address]. 2. Click on the First Time User? Click Here link in the Sign In box. You will see the New Member Sign Up page. 3. Enter your Smart Cube Access Code exactly as it appears below. You will not need to use this code after youve completed the sign-up process. If you do not sign up before the expiration date, you must request a new code.     Smart Cube Access Code: [email or web address]  Expires: 8/9/2017 10:27 AM (This is the date your redealize access code will )    4. Enter the last four digits of your Social Security Number (xxxx) and Date of Birth (mm/dd/yyyy) as indicated and click Submit. You will be taken to the next sign-up page. 5. Create a EDITDt ID. This will be your redealize login ID and cannot be changed, so think of one that is secure and easy to remember. 6. Create a redealize password. You can change your password at any time. 7. Enter your Password Reset Question and Answer. This can be used at a later time if you forget your password. 8. Enter your e-mail address. You will receive e-mail notification when new information is available in [address] 9. Click Sign Up. You can now view and download portions of your medical record. 10. Click the Download Summary menu link to download a portable copy of your medical information. Additional Information    If you have questions, please call [phone]. Remember, redealize is NOT to be used for urgent needs. For medical emergencies, dial 911.

## 2017-05-11 NOTE — PROGRESS NOTES
217 [facility] [place]. [place], [address]  Tel.  [phone]  Fax. 100 [facility] 60  [place], [address] [facility]  Tel.  [phone]  Fax. [phone] [address] [address] [address]  Tel.  [phone]  Fax. [phone]     S>Natanael Patten is a 61 y.o. female seen for a positive airway pressure follow-up. She reports no problems using the device. The following problems are identified:    Drowsiness no Problems exhaling no   Snoring no Forget to put on no   Mask Comfortable yes Can't fall asleep no   Dry Mouth no Mask falls off no   Air Leaking no Frequent awakenings no       Download reviewed. She admits that her sleep has significantly improved. Therapy Apnea Index averaged over PAP use: 2 /hr which reflects significantly improved sleep breathing condition. Allergies   Allergen Reactions    Other Medication Hives     holter monitor pads       She has a current medication list which includes the following prescription(s): vitamin d3, letrozole, aspir-low, ranitidine, simvastatin, and multivitamin with iron. .      She  has a past medical history of Cancer ([facility] [address] [facility] (7340,6279 ); Diabetes ([facility] [facility] GERD (gastroesophageal reflux disease); Hypercholesterolemia; Nausea & vomiting; Sleep apnea; and Stroke ([facility] [place] Sleepiness Score: 6   and Modified F.O.S.Q. Score Total / 2: 19   which reflect improved sleep quality over therapy time. O>    Visit Vitals    /85    Pulse 77    Ht 5' 6\" (1.676 m)    Wt 240 lb (108.9 kg)    SpO2 94%    BMI 38.74 kg/m2           General:   Alert, oriented, not in distress   Neck:   No JVD    Chest/Lungs:  symetrical lung expansion , no accessory muscle use    Extremities:  no obvious rashes , negative edema    Neuro:  No focal deficits ; No obvious tremor    Psych:  Normal affect ,  Normal countenance ;           A>    ICD-10-CM ICD-9-CM    1. Obstructive sleep apnea (adult) (pediatric) G47.33 327.23    2.  Obesity (BMI 30-39. 9) E66.9 278.00      AH = 30(1-17). On CPAP :  5-10 cmH2O. Compliant:      yes    Therapeutic Response:  Positive    P>      * Follow-up Disposition:  Return in about 1 year (around 5/11/2018). she will continue on her current pressure settings. * She was asked to contact our office for any problems regarding PAP therapy. * Counseling was provided regarding the importance of regular PAP use and on proper sleep hygiene and safe driving. * Re-enforced proper and regular cleaning for the device. 2. Obesity - I have counseled the patient regarding the benefits of weight loss.       Brooklynn Brambila MD  Diplomate in Sleep Medicine  [facility]

## 2017-05-15 ENCOUNTER — OFFICE VISIT (OUTPATIENT)
Dept: ONCOLOGY | Age: 67
End: 2017-05-15

## 2017-05-15 VITALS
WEIGHT: 238 LBS | HEIGHT: 66 IN | RESPIRATION RATE: 18 BRPM | TEMPERATURE: 98 F | BODY MASS INDEX: 38.25 KG/M2 | SYSTOLIC BLOOD PRESSURE: 149 MMHG | DIASTOLIC BLOOD PRESSURE: 95 MMHG | HEART RATE: 63 BPM | OXYGEN SATURATION: 96 %

## 2017-05-15 DIAGNOSIS — C50.912 MALIGNANT NEOPLASM OF LEFT FEMALE BREAST, UNSPECIFIED SITE OF BREAST: Primary | ICD-10-CM

## 2017-05-15 DIAGNOSIS — E55.9 VITAMIN D DEFICIENCY: ICD-10-CM

## 2017-05-15 RX ORDER — ERGOCALCIFEROL 1.25 MG/1
50000 CAPSULE ORAL
Qty: 4 CAP | Refills: 5 | Status: SHIPPED | OUTPATIENT
Start: 2017-05-15 | End: 2018-03-22

## 2017-05-15 NOTE — PROGRESS NOTES
Francheska Vega is a 61 y.o. female   had concerns including Follow-up and Breast Cancer. HIPAA verified by two patient identifiers. Ms. Dixie Patten has a reminder for a \"due or due soon\" health maintenance. I have asked that she contact her primary care provider for follow-up on this health maintenance.

## 2017-05-15 NOTE — PROGRESS NOTES
Follow-up Note        Patient: Francheska Vega MRN: [id number]  SSN: [id number]    YOB: 1950  Age: 61 y.o. Sex: female        Diagnosis:     1. 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 On Letrozole - started 5/1/2017  2. Completed radiation therapy on 4/28/2017  3. Lumpectomy on 1/18/2017      Subjective:      Francheska Vega is a 61 y.o. female with a diagnosis of left sided breast carcinoma. She underwent a screening mammogram and was noted to have an abnormality in the left breast. Biopsy showed mucinous carcinoma which is ER and VT +ve. She was seen by Dr. Alen Fuller and then underwent left breast lumpectomy on 01/18/2017. She finished radiation therapy on 4/28/2017. She started hormonal therapy with Letrozole and is tolerating it well without side effects. She is here today with her  and has no complaints.         Review of Systems:    Constitutional: negative  Eyes: negative  Ears, Nose, Mouth, Throat, and Face: negative  Respiratory: negative  Cardiovascular: negative  Gastrointestinal: negative  Genitourinary:negative  Integument/Breast: negative  Hematologic/Lymphatic: negative  Musculoskeletal:negative  Neurological: negative        Past Medical History:   Diagnosis Date    Cancer [facility]) [id number]     breast cancer right,left breast    Diabetes ([facility]     GERD (gastroesophageal reflux disease)     Hypercholesterolemia     Nausea & vomiting     Sleep apnea     possible, to have sleep study 1/26/2017    Stroke [facility])      Past Surgical History:   Procedure Laterality Date    BREAST SURGERY PROCEDURE UNLISTED  1991    right mastectomy,lymph node biopsy    HX BREAST BIOPSY Left 2016    stereotatic    HX BREAST LUMPECTOMY Left 1/18/2017    LEFT BREAST LUMPECTOMY AND LEFT BREAST SENTINEL NODE BIOPSY WITH BLUE DYE performed by Scott Shah MD at [facility]      Family History   Problem Relation Age of Onset    Breast Cancer Mother     Dementia Mother     Heart Disease Father     Cancer Sister      pancreatic     Social History   Substance Use Topics    Smoking status: Never Smoker    Smokeless tobacco: Not on file    Alcohol use No      Prior to Admission medications    Medication Sig Start Date End Date Taking? Authorizing Provider   VITAMIN D3 1,000 unit tablet  3/23/17  Yes Historical Provider   letrozole [facility]) 2.5 mg tablet Take 1 Tab by mouth daily. 2/13/17  Yes Ann Alonzo MD   ASPIR-LOW 81 mg tablet Takes irregularly,usually weekly 11/1/16  Yes Historical Provider   raNITIdine (ZANTAC) 150 mg tablet daily. 11/17/16  Yes Historical Provider   simvastatin (ZOCOR) 20 mg tablet 20 mg nightly. 11/1/16  Yes Historical Provider   multivitamin with iron (DAILY MULTI-VITAMINS/IRON) tablet Take 1 Tab by mouth daily. Yes Historical Provider              Allergies   Allergen Reactions    Other Medication Hives     holter monitor pads           Objective:     Vitals:    05/15/17 1003   BP: (!) 149/95   Pulse: 63   Resp: 18   Temp: 98 °F (36.7 °C)   SpO2: 96%   Weight: 238 lb (108 kg)   Height: 5' 6\" (1.676 m)            Physical Exam:    GENERAL: alert, cooperative  EYE: negative  LYMPHATIC: Cervical, supraclavicular, and axillary nodes normal.   THROAT & NECK: normal and no erythema or exudates noted. LUNG: clear to auscultation bilaterally  HEART: regular rate and rhythm  ABDOMEN: soft, non-tender  EXTREMITIES: no cyanosis or edema  SKIN: Normal.  NEUROLOGIC: negative        Assessment:     1. Left breast carcinoma:  pT1b N0 M0 (Stage IA) mucinous carcinoma, Tumor size 1 cm, LN -ve, grade 2, %, ID 80%, Her 2 -ve    ECOG PS 0  Intent of treatment - curative    S/P left sided lumpectomy 01/18/2017  Completed radiation therapy on 4/28/2017    On Letrozole  Tolerating well       Symptom management form reviewed with patient.       2. Osteopenia    DEXA scan (2/20/2017): osteopenic  Arrange for prolia/reclast      3. Vitamin D deficiency    > Vit D level - 29  > Start Vitamin D 50,000 units weekly      Plan:       > Continue Letrozole  > Start weekly Vitamin D  > Start prolia/reclast  > Follow-up appointment in 6 months        Signed by: Troy Barron MD                     May 15, 2017          CC. Mitch Duke MD  CC.  Willa Roy MD

## 2017-06-08 ENCOUNTER — OFFICE VISIT (OUTPATIENT)
Dept: INTERNAL MEDICINE CLINIC | Age: 67
End: 2017-06-08

## 2017-06-08 VITALS
BODY MASS INDEX: 37.93 KG/M2 | TEMPERATURE: 98.1 F | HEART RATE: 69 BPM | HEIGHT: 66 IN | RESPIRATION RATE: 16 BRPM | WEIGHT: 236 LBS | DIASTOLIC BLOOD PRESSURE: 84 MMHG | SYSTOLIC BLOOD PRESSURE: 124 MMHG

## 2017-06-08 DIAGNOSIS — K21.9 GASTROESOPHAGEAL REFLUX DISEASE WITHOUT ESOPHAGITIS: ICD-10-CM

## 2017-06-08 DIAGNOSIS — E55.9 VITAMIN D DEFICIENCY: ICD-10-CM

## 2017-06-08 DIAGNOSIS — Z83.3 FAMILY HISTORY OF DIABETES MELLITUS: ICD-10-CM

## 2017-06-08 DIAGNOSIS — M85.88 OSTEOPENIA OF SPINE: ICD-10-CM

## 2017-06-08 DIAGNOSIS — C50.912 MALIGNANT NEOPLASM OF LEFT FEMALE BREAST, UNSPECIFIED SITE OF BREAST: ICD-10-CM

## 2017-06-08 DIAGNOSIS — Z11.59 NEED FOR HEPATITIS C SCREENING TEST: ICD-10-CM

## 2017-06-08 DIAGNOSIS — E78.00 PURE HYPERCHOLESTEROLEMIA: Primary | ICD-10-CM

## 2017-06-08 RX ORDER — SIMVASTATIN 20 MG/1
20 TABLET, FILM COATED ORAL
Qty: 30 TAB | Refills: 11 | Status: SHIPPED | OUTPATIENT
Start: 2017-06-08 | End: 2019-09-25 | Stop reason: SDUPTHER

## 2017-06-08 NOTE — PROGRESS NOTES
HPI: Ying Smith is a 61 y.o. female presents to establish. History of breast cancer in 1996 on right and second on left in 2016. Left lumpectomy, XRT, Letrozole started. Sees Dr. Heike Aden. Per Dr. Heike Aden, recommended Prolia vs. Reclast due to osteopenia in LS spine and on Letrozole. Prior colon screening. About 8 years ago. Normal.  Reports normal BM. Taking zantac for GERD, helpful when taken. Treated for HLP. Trying to follow low fat diet. On simvastatin, no myalgias. Family history of diabetes. Concerned about her risk.      JELLY with CPAP.     ROS:  Constitutional: negative for fevers, chills, anorexia, weight loss  Eyes:   negative for visual disturbance, irritation  ENT:   negative for tinnitus,sore throat,nasal congestion,ear pain,  Respiratory:  negative for cough, hemoptysis, dyspnea,wheezing  CV:   negative for chest pain, palpitations, lower extremity edema  GI:   negative for nausea, vomiting, diarrhea, abdominal pain,melena  Endo:               negative for polyuria,polydipsia,polyphagia,heat intolerance  Genitourinary: negative for frequency, dysuria, hematuria  Integument:  negative for rash, pruritus  Hematologic:  negative for easy bruising and gum/nose bleeding  Musculoskel: negative for myalgias, back pain, muscle weakness, joint pain  Neurological:  negative for headaches, dizziness, gait problems, numbness  Behavl/Psych: negative for feelings of anxiety, depression, mood changes    Past Medical History:   Diagnosis Date    Cancer [facility]) [id number]     breast cancer right,left breast    GERD (gastroesophageal reflux disease)     Hypercholesterolemia     Sleep apnea     possible, to have sleep study 1/26/2017     Past Surgical History:   Procedure Laterality Date    BREAST SURGERY PROCEDURE UNLISTED  1991    right mastectomy,lymph node biopsy    HX BREAST BIOPSY Left 2016    stereotatic    HX BREAST LUMPECTOMY Left 1/18/2017    LEFT BREAST LUMPECTOMY AND LEFT BREAST SENTINEL NODE BIOPSY WITH BLUE DYE performed by Ines Velez MD at [address]    hysterectomy/BSO     Social History     Social History    Marital status:      Spouse name: N/A    Number of children: N/A    Years of education: N/A     Social History Main Topics    Smoking status: Never Smoker    Smokeless tobacco: None    Alcohol use No    Drug use: No    Sexual activity: Not Asked     Other Topics Concern    None     Social History Narrative     Family History   Problem Relation Age of Onset    Breast Cancer Mother     Dementia Mother     Diabetes Mother     Heart Disease Father     Stroke Father     Cancer Sister      pancreatic    Cancer Brother      lung    Diabetes Sister     Hypertension Sister      Current Outpatient Prescriptions   Medication Sig Dispense Refill    simvastatin (ZOCOR) 20 mg tablet Take 1 Tab by mouth nightly. 30 Tab 11    ergocalciferol (ERGOCALCIFEROL) 50,000 unit capsule Take 1 Cap by mouth every seven (7) days. 4 Cap 5    letrozole (FEMARA) 2.5 mg tablet Take 1 Tab by mouth daily. 90 Tab 3    ASPIR-LOW 81 mg tablet Takes irregularly,usually weekly      raNITIdine (ZANTAC) 150 mg tablet daily.  multivitamin with iron (DAILY MULTI-VITAMINS/IRON) tablet Take 1 Tab by mouth daily.        Allergies   Allergen Reactions    Other Medication Hives     holter monitor pads         Physical exam:  Visit Vitals    /84 (BP 1 Location: Left arm, BP Patient Position: Sitting)    Pulse 69    Temp 98.1 °F (36.7 °C) (Oral)    Resp 16    Ht 5' 6\" (1.676 m)    Wt 236 lb (107 kg)    BMI 38.09 kg/m2     General appearance - alert, well appearing, and in no distress  HEENT- PERLL,normal conjunctiva, TM normal bilaterally, hearing grossly normal, normal nasal turbinates, no sinus tenderness, mucous membranes moist, pharynx normal without lesions  Neck - supple, no significant adenopathy   Pulm- clear to auscultation, no wheezes, rales or rhonchi  CV- normal rate, regular rhythm, normal S1, S2, no murmurs   Abdomen - soft, nontender, nondistended, no masses or organomegaly  Extrem-peripheral pulses normal, no pedal edema  Neuro -alert, oriented,nonfocal    Assessment/Plan:    1. Pure hypercholesterolemia- Recommend AHA diet and regular cardiovascular exercise. Continue statin therapy. - LIPID PANEL; Future  - METABOLIC PANEL, COMPREHENSIVE; Future  - CBC W/O DIFF; Future  - simvastatin (ZOCOR) 20 mg tablet; Take 1 Tab by mouth nightly. Dispense: 30 Tab; Refill: 11    2. Malignant neoplasm of left female breast, unspecified site of breast (HCC)-follow up with oncology      3. Vitamin D deficiency    - VITAMIN D, 25 HYDROXY; Future    4. Osteopenia of spine- Recommend weight bearing exercise, like walking. Calcium 1200mg daily, most from foods, less from supplement. Vitamin D3 2,000 iu once a day. Recommend Prolia. - METABOLIC PANEL, COMPREHENSIVE; Future  - PHOSPHORUS; Future  - MAGNESIUM; Future    5. Family history of diabetes mellitus    - HEMOGLOBIN A1C W/O EAG; Future    6. Need for hepatitis C screening test    - HCV AB W/RFLX TO JAMAL; Future    7. Gastroesophageal reflux disease without esophagitis- Recommend bland diet to reduce reflux symptoms. Avoid caffeine. Use PPI as directed. Follow-up Disposition:  Return for follow up for fasting labs.   Yamile Hernandes MD

## 2017-06-08 NOTE — MR AVS SNAPSHOT
Visit Information Date & Time Provider Department Dept. Phone Encounter #  
 6/8/2017  2:30 PM Niya Milian, [address] [phone] [id number] Follow-up Instructions Return for follow up for fasting labs. .  
  
Your Appointments 11/15/2017 10:30 AM  
Any with Felicitas Monteiro MD  
[address] Oncology at [employer]) Appt Note: 6 mth f/u  
 [address] [facility] [phone] [phone]  
[address] [place] [address] [address] Reagan Crawford Upcoming Health Maintenance Date Due Hepatitis C Screening 1950 Pneumococcal 19-64 Highest Risk (1 of 3 - PCV13) 6/22/1971 DTaP/Tdap/Td series (1 - Tdap) 6/22/1973 PAP AKA CERVICAL CYTOLOGY 6/22/1973 FOBT Q 1 YEAR AGE 50-75 6/22/2002 ZOSTER VACCINE AGE 60> 6/22/2012 INFLUENZA AGE 9 TO ADULT 8/1/2017 BREAST CANCER SCRN MAMMOGRAM 11/22/2018 Allergies as of 6/8/2017  Review Complete On: 6/8/2017 By: Niya Milian MD  
  
 Severity Noted Reaction Type Reactions Other Medication  01/10/2017    Hives  
 holter monitor pads Current Immunizations  Reviewed on 5/15/2017 No immunizations on file. Not reviewed this visit You Were Diagnosed With   
  
 Codes Comments Pure hypercholesterolemia    -  Primary ICD-10-CM: E78.00 ICD-9-CM: 272.0 Malignant neoplasm of left female breast, unspecified site of breast ([facility] [address]     ICD-10-CM: F81.610 ICD-9-CM: 174.9 Vitamin D deficiency     ICD-10-CM: E55.9 ICD-9-CM: 268.9 Osteopenia of spine     ICD-10-CM: M85.88 ICD-9-CM: 733.90 Family history of diabetes mellitus     ICD-10-CM: Z83.3 ICD-9-CM: V18.0 Need for hepatitis C screening test     ICD-10-CM: Z11.59 
ICD-9-CM: V73.89 Gastroesophageal reflux disease without esophagitis     ICD-10-CM: K21.9 ICD-9-CM: 530.81 Vitals BP Pulse Temp Resp Height(growth percentile) Weight(growth percentile) 124/84 (BP 1 Location: Left arm, BP Patient Position: Sitting) 69 98.1 °F (36.7 °C) (Oral) 16 5' 6\" (1.676 m) 236 lb (107 kg) BMI OB Status Smoking Status 38.09 kg/m2 Hysterectomy Never Smoker Vitals History BMI and BSA Data Body Mass Index Body Surface Area 38.09 kg/m 2 2.23 m 2 Preferred Pharmacy Pharmacy Name Phone [facility] [address] [place] [address], [facility] [address] [phone] Your Updated Medication List  
  
   
This list is accurate as of: 6/8/17  4:06 PM.  Always use your most recent med list.  
  
  
  
  
 ASPIR-LOW 81 mg tablet Generic drug:  aspirin delayed-release Takes irregularly,usually weekly DAILY MULTI-VITAMINS/IRON tablet Generic drug:  multivitamin with iron Take 1 Tab by mouth daily. ergocalciferol 50,000 unit capsule Commonly known as:  ERGOCALCIFEROL Take 1 Cap by mouth every seven (7) days. letrozole 2.5 mg tablet Commonly known as:  [facility] Take 1 Tab by mouth daily. raNITIdine 150 mg tablet Commonly known as:  ZANTAC  
daily. simvastatin 20 mg tablet Commonly known as:  ZOCOR Take 1 Tab by mouth nightly. Prescriptions Printed Refills  
 simvastatin (ZOCOR) 20 mg tablet 11 Sig: Take 1 Tab by mouth nightly. Class: Print Route: Oral  
  
Follow-up Instructions Return for follow up for fasting labs. Agueda Abraham To-Do List   
 06/08/2017 Lab:  CBC W/O DIFF   
  
 06/08/2017 Lab:  HCV AB W/RFLX TO JAMAL   
  
 06/08/2017 Lab:  HEMOGLOBIN A1C W/O EAG   
  
 06/08/2017 Lab:  LIPID PANEL   
  
 06/08/2017 Lab:  MAGNESIUM   
  
 06/08/2017 Lab:  METABOLIC PANEL, COMPREHENSIVE   
  
 06/08/2017 Lab:  PHOSPHORUS   
  
 06/08/2017 Lab:  VITAMIN D, 25 HYDROXY Patient Instructions Dr. Padilla Watkins recommended Prolia or Reclast.   
 
  
Introducing [facility]! [place] [facility] introduces [facility] patient portal. Now you can access parts of your medical record, email your doctor's office, and request medication refills online. 1. In your internet browser, go to [email or web address] 2. Click on the First Time User? Click Here link in the Sign In box. You will see the New Member Sign Up page. 3. Enter your GMI Access Code exactly as it appears below. You will not need to use this code after youve completed the sign-up process. If you do not sign up before the expiration date, you must request a new code. · GMI Access Code: [email or web address] Expires: 8/9/2017 10:27 AM 
 
4. Enter the last four digits of your Social Security Number (xxxx) and Date of Birth (mm/dd/yyyy) as indicated and click Submit. You will be taken to the next sign-up page. 5. Create a GMI ID. This will be your GMI login ID and cannot be changed, so think of one that is secure and easy to remember. 6. Create a GMI password. You can change your password at any time. 7. Enter your Password Reset Question and Answer. This can be used at a later time if you forget your password. 8. Enter your e-mail address. You will receive e-mail notification when new information is available in [address] 9. Click Sign Up. You can now view and download portions of your medical record. 10. Click the Download Summary menu link to download a portable copy of your medical information. If you have questions, please visit the Frequently Asked Questions section of the [employer]MI website. Remember, [employer]MI is NOT to be used for urgent needs. For medical emergencies, dial 911. Now available from your iPhone and Android! Please provide this summary of care documentation to your next provider. Your primary care clinician is listed as Cristi Levine.  If you have any questions after today's visit, please call [phone].

## 2017-06-13 ENCOUNTER — DOCUMENTATION ONLY (OUTPATIENT)
Dept: SLEEP MEDICINE | Age: 67
End: 2017-06-13

## 2017-06-13 ENCOUNTER — OFFICE VISIT (OUTPATIENT)
Dept: SLEEP MEDICINE | Age: 67
End: 2017-06-13

## 2017-06-13 VITALS
OXYGEN SATURATION: 94 % | TEMPERATURE: 98.1 F | SYSTOLIC BLOOD PRESSURE: 126 MMHG | HEART RATE: 76 BPM | DIASTOLIC BLOOD PRESSURE: 83 MMHG | BODY MASS INDEX: 38.25 KG/M2 | HEIGHT: 66 IN | WEIGHT: 238 LBS

## 2017-06-13 DIAGNOSIS — E66.9 OBESITY (BMI 30-39.9): ICD-10-CM

## 2017-06-13 DIAGNOSIS — G47.33 OBSTRUCTIVE SLEEP APNEA (ADULT) (PEDIATRIC): Primary | ICD-10-CM

## 2017-06-13 NOTE — PATIENT INSTRUCTIONS
217 [facility]., [place], [address]  Tel.  [phone]  Fax. 100 [facility] 60  [place], [address]  Tel.  [phone]  Fax. [phone] 9[address] [facility] Skye Maldonado  Tel.  [phone]  Fax. [phone]     PROPER SLEEP HYGIENE    What to avoid  · Do not have drinks with caffeine, such as coffee or black tea, for 8 hours before bed. · Do not smoke or use other types of tobacco near bedtime. Nicotine is a stimulant and can keep you awake. · Avoid drinking alcohol late in the evening, because it can cause you to wake in the middle of the night. · Do not eat a big meal close to bedtime. If you are hungry, eat a light snack. · Do not drink a lot of water close to bedtime, because the need to urinate may wake you up during the night. · Do not read or watch TV in bed. Use the bed only for sleeping and sexual activity. What to try  · Go to bed at the same time every night, and wake up at the same time every morning. Do not take naps during the day. · Keep your bedroom quiet, dark, and cool. · Get regular exercise, but not within 3 to 4 hours of your bedtime. .  · Sleep on a comfortable pillow and mattress. · If watching the clock makes you anxious, turn it facing away from you so you cannot see the time. · If you worry when you lie down, start a worry book. Well before bedtime, write down your worries, and then set the book and your concerns aside. · Try meditation or other relaxation techniques before you go to bed. · If you cannot fall asleep, get up and go to another room until you feel sleepy. Do something relaxing. Repeat your bedtime routine before you go to bed again. · Make your house quiet and calm about an hour before bedtime. Turn down the lights, turn off the TV, log off the computer, and turn down the volume on music. This can help you relax after a busy day.     Drowsy Driving  The [address] [place] [address] [place] Traffic Safety Administration cites drowsiness as a causing factor in more than 067,401 police reported crashes annually, resulting in 76,000 injuries and 1,500 deaths. Other surveys suggest 55% of people polled have driven while drowsy in the past year, 23% had fallen asleep but not crashed, 3% crashed, and 2% had and accident due to drowsy driving. Who is at risk? Young Drivers: One study of drowsy driving accidents states that 55% of the drivers were under 25 years. Of those, 75% were male. Shift Workers and Travelers: People who work overnight or travel across time zones frequently are at higher risk of experiencing Circadian Rhythm Disorders. They are trying to work and function when their body is programed to sleep. Sleep Deprived: Lack of sleep has a serious impact on your ability to pay attention or focus on a task. Consistently getting less than the average of 8 hours your body needs creates partial or cumulative sleep deprivation. Untreated Sleep Disorders: Sleep Apnea, Narcolepsy, R.L.S., and other sleep disorders (untreated) prevent a person from getting enough restful sleep. This leads to excessive daytime sleepiness and increases the risk for drowsy driving accidents by up to 7 times. Medications / Alcohol: Even over the counter medications can cause drowsiness. Medications that impair a drivers attention should have a warning label. Alcohol naturally makes you sleepy and on its own can cause accidents. Combined with excessive drowsiness its effects are amplified. Signs of Drowsy Driving:   * You don't remember driving the last few miles   * You may drift out of your angelito   * You are unable to focus and your thoughts wander   * You may yawn more often than normal   * You have difficulty keeping your eyes open / nodding off   * Missing traffic signs, speeding, or tailgating  Prevention-   Good sleep hygiene, lifestyle and behavioral choices have the most impact on drowsy driving.  There is no substitute for sleep and the average person requires 8 hours nightly. If you find yourself driving drowsy, stop and sleep. Consider the sleep hygiene tips provided during your visit as well. Medication Refill Policy: Refills for all medications require 1 week advance notice. Please have your pharmacy fax a refill request. We are unable to fax, or call in \"controled substance\" medications and you will need to pick these prescriptions up from our office. FlowBelow [employer] Activation    Thank you for requesting access to [employer]. Please follow the instructions below to securely access and download your online medical record. [employer] allows you to send messages to your doctor, view your test results, renew your prescriptions, schedule appointments, and more. How Do I Sign Up? 1. In your internet browser, go to [email or web address] The Doctor. 2. Click on the First Time User? Click Here link in the Sign In box. You will see the New Member Sign Up page. 3. Enter your Flow[employer] Access Code exactly as it appears below. You will not need to use this code after youve completed the sign-up process. If you do not sign up before the expiration date, you must request a new code. [employer] Access Code: [email or web address]  Expires: 2017 10:27 AM (This is the date your Flow[employer] access code will )    4. Enter the last four digits of your Social Security Number (xxxx) and Date of Birth (mm/dd/yyyy) as indicated and click Submit. You will be taken to the next sign-up page. 5. Create a FlowBelow Aero ID. This will be your FlowBelow Aero login ID and cannot be changed, so think of one that is secure and easy to remember. 6. Create a Flow[employer] password. You can change your password at any time. 7. Enter your Password Reset Question and Answer. This can be used at a later time if you forget your password. 8. Enter your e-mail address. You will receive e-mail notification when new information is available in [address] 9. Click Sign Up.  You can now view and download portions of your medical record. 10. Click the Download Summary menu link to download a portable copy of your medical information. Additional Information    If you have questions, please call [phone]. Remember, [employer] is NOT to be used for urgent needs. For medical emergencies, dial 911.

## 2017-06-13 NOTE — PROGRESS NOTES
217 [facility], [address]  Tel.  [phone]  Fax. 100 [facility] 60  [place], [address] [facility]  Tel.  [phone]  Fax. [phone] [address] [address] Skye Maldonado   Tel.  [phone]  Fax. [phone]     S>Natanael Marroquin is a 61 y.o. female seen for a positive airway pressure follow-up. She says her [employer] company required a visit since she is switching to medicare  She reports no problems using the device. The following problems are identified:    Drowsiness no Problems exhaling no   Snoring no Forget to put on no   Mask Comfortable yes Can't fall asleep no   Dry Mouth no Mask falls off no   Air Leaking no Frequent awakenings no     Download reviewed. She admits that her sleep has significantly improved. Therapy Apnea Index averaged over PAP use: 2 /hr which reflects significantly improved sleep breathing condition. Allergies   Allergen Reactions    Other Medication Hives     holter monitor pads       She has a current medication list which includes the following prescription(s): simvastatin, ergocalciferol, letrozole, aspir-low, ranitidine, and multivitamin with iron. .      She  has a past medical history of Cancer ([facility] [address] ([phone] ); GERD (gastroesophageal reflux disease); Hypercholesterolemia; and Sleep apnea. She also has no past medical history of Nausea & vomiting. Trilla Sleepiness Score: 9   and Modified F.O.S.Q. Score Total / 2: 19.5   which reflect improved sleep quality over therapy time. O>    Visit Vitals    /83    Pulse 76    Temp 98.1 °F (36.7 °C)    Ht 5' 6\" (1.676 m)    Wt 238 lb (108 kg)    SpO2 94%    BMI 38.41 kg/m2           General:   Alert, oriented, not in distress   Neck:   No JVD    Chest/Lungs:  symetrical lung expansion , no accessory muscle use    Extremities:  no obvious rashes , negative edema    Neuro:  No focal deficits ;  No obvious tremor    Psych:  Normal affect ,  Normal countenance ;           A>    ICD-10-CM ICD-9-CM    1. Obstructive sleep apnea (adult) (pediatric) G47.33 327.23 AMB SUPPLY ORDER   2. Obesity (BMI 30-39. 9) E66.9 278.00      AHI = 30(1-17). On CPAP :  5-10 cmH2O. Compliant:      yes    Therapeutic Response:  Positive    P>      * Follow-up Disposition:  Return in about 1 year (around 6/13/2018). she will continue on her current pressure settings. I have ordered replacement supplies    * She was asked to contact our office for any problems regarding PAP therapy. * Counseling was provided regarding the importance of regular PAP use and on proper sleep hygiene and safe driving. * Re-enforced proper and regular cleaning for the device. 2. Obesity - I have counseled the patient regarding the benefits of weight loss.       Rhys Calvillo MD  Diplomate in Sleep Medicine  [facility]

## 2017-06-21 ENCOUNTER — HOSPITAL ENCOUNTER (OUTPATIENT)
Dept: INFUSION THERAPY | Age: 67
Discharge: HOME | End: 2017-06-21
Payer: MEDICARE

## 2017-06-21 VITALS
TEMPERATURE: 98 F | HEART RATE: 70 BPM | SYSTOLIC BLOOD PRESSURE: 134 MMHG | RESPIRATION RATE: 20 BRPM | DIASTOLIC BLOOD PRESSURE: 87 MMHG

## 2017-06-21 PROCEDURE — 74011250636 HC RX REV CODE- 250/636: Performed by: INTERNAL MEDICINE

## 2017-06-21 PROCEDURE — 96372 THER/PROPH/DIAG INJ SC/IM: CPT

## 2017-06-21 RX ADMIN — DENOSUMAB 60 MG: 60 INJECTION SUBCUTANEOUS at 14:38

## 2017-06-21 NOTE — PROGRESS NOTES
1315 Pt arrived at [facility] ambulatory and in no distress for labs/ prolia. Assessment completed, no new complaints voiced. Handout given on prolia and reviewed with pt. Labs drawn by Alton WAITE. Call back from lab 1 hour later that sample was \"contaminated\" and unusable. Pt is due for labwork by PCP in near future. Ok per Keenan Kasper NP to give prolia today without re-drawing a blood sample. Pt instructed to obtain labs as ordered by PCP as soon as possible. April labs done elsewhere showed a calcium level of 9.2    Visit Vitals    /87 (BP 1 Location: Left arm, BP Patient Position: Sitting)    Pulse 70    Temp 98 °F (36.7 °C)    Resp 20       Medications received:  Prolia 60 mg SQ FIDENCIO    1500 Tolerated treatment well, no adverse reaction noted. D/Cd from [facility] ambulatory and in no distress accompanied by . Next appt 6 months, pt has card.

## 2017-06-26 ENCOUNTER — LAB ONLY (OUTPATIENT)
Dept: INTERNAL MEDICINE CLINIC | Age: 67
End: 2017-06-26

## 2017-06-26 ENCOUNTER — HOSPITAL ENCOUNTER (OUTPATIENT)
Dept: LAB | Age: 67
Discharge: HOME | End: 2017-06-26
Payer: MEDICARE

## 2017-06-26 DIAGNOSIS — Z83.3 FAMILY HISTORY OF DIABETES MELLITUS: ICD-10-CM

## 2017-06-26 DIAGNOSIS — E55.9 VITAMIN D DEFICIENCY: ICD-10-CM

## 2017-06-26 DIAGNOSIS — Z11.59 NEED FOR HEPATITIS C SCREENING TEST: ICD-10-CM

## 2017-06-26 DIAGNOSIS — M85.88 OSTEOPENIA OF SPINE: ICD-10-CM

## 2017-06-26 DIAGNOSIS — E78.00 PURE HYPERCHOLESTEROLEMIA: ICD-10-CM

## 2017-06-26 PROCEDURE — 80061 LIPID PANEL: CPT

## 2017-06-26 PROCEDURE — 86803 HEPATITIS C AB TEST: CPT

## 2017-06-26 PROCEDURE — 84100 ASSAY OF PHOSPHORUS: CPT

## 2017-06-26 PROCEDURE — 36415 COLL VENOUS BLD VENIPUNCTURE: CPT

## 2017-06-26 PROCEDURE — 80053 COMPREHEN METABOLIC PANEL: CPT

## 2017-06-26 PROCEDURE — 83735 ASSAY OF MAGNESIUM: CPT

## 2017-06-26 PROCEDURE — 82306 VITAMIN D 25 HYDROXY: CPT

## 2017-06-26 PROCEDURE — 85027 COMPLETE CBC AUTOMATED: CPT

## 2017-06-26 PROCEDURE — 83036 HEMOGLOBIN GLYCOSYLATED A1C: CPT

## 2017-06-27 LAB
25(OH)D3+25(OH)D2 SERPL-MCNC: 38.8 NG/ML (ref 30–100)
ALBUMIN SERPL-MCNC: 4.3 G/DL (ref 3.6–4.8)
ALBUMIN/GLOB SERPL: 1.3 {RATIO} (ref 1.2–2.2)
ALP SERPL-CCNC: 83 IU/L (ref 39–117)
ALT SERPL-CCNC: 17 IU/L (ref 0–32)
AST SERPL-CCNC: 18 IU/L (ref 0–40)
BILIRUB SERPL-MCNC: 0.3 MG/DL (ref 0–1.2)
BUN SERPL-MCNC: 13 MG/DL (ref 8–27)
BUN/CREAT SERPL: 13 (ref 12–28)
CALCIUM SERPL-MCNC: 9.6 MG/DL (ref 8.7–10.3)
CHLORIDE SERPL-SCNC: 103 MMOL/L (ref 96–106)
CHOLEST SERPL-MCNC: 162 MG/DL (ref 100–199)
CO2 SERPL-SCNC: 24 MMOL/L (ref 18–29)
CREAT SERPL-MCNC: 0.98 MG/DL (ref 0.57–1)
ERYTHROCYTE [DISTWIDTH] IN BLOOD BY AUTOMATED COUNT: 15.1 % (ref 12.3–15.4)
GLOBULIN SER CALC-MCNC: 3.2 G/DL (ref 1.5–4.5)
GLUCOSE SERPL-MCNC: 87 MG/DL (ref 65–99)
HBA1C MFR BLD: 6 % (ref 4.8–5.6)
HCT VFR BLD AUTO: 38.3 % (ref 34–46.6)
HCV AB S/CO SERPL IA: <0.1 S/CO RATIO (ref 0–0.9)
HCV AB SERPL QL IA: NORMAL
HDLC SERPL-MCNC: 47 MG/DL
HGB BLD-MCNC: 13 G/DL (ref 11.1–15.9)
LDLC SERPL CALC-MCNC: 84 MG/DL (ref 0–99)
MAGNESIUM SERPL-MCNC: 2 MG/DL (ref 1.6–2.3)
MCH RBC QN AUTO: 30.4 PG (ref 26.6–33)
MCHC RBC AUTO-ENTMCNC: 33.9 G/DL (ref 31.5–35.7)
MCV RBC AUTO: 90 FL (ref 79–97)
PHOSPHATE SERPL-MCNC: 3.4 MG/DL (ref 2.5–4.5)
PLATELET # BLD AUTO: 188 X10E3/UL (ref 150–379)
POTASSIUM SERPL-SCNC: 4.8 MMOL/L (ref 3.5–5.2)
PROT SERPL-MCNC: 7.5 G/DL (ref 6–8.5)
RBC # BLD AUTO: 4.28 X10E6/UL (ref 3.77–5.28)
SODIUM SERPL-SCNC: 141 MMOL/L (ref 134–144)
TRIGL SERPL-MCNC: 155 MG/DL (ref 0–149)
VLDLC SERPL CALC-MCNC: 31 MG/DL (ref 5–40)
WBC # BLD AUTO: 4.7 X10E3/UL (ref 3.4–10.8)

## 2017-06-28 NOTE — PROGRESS NOTES
Notify patient she is prediabetic HbA1C 6.0% with normal sugar. Follow low sugar, lower carb diet. Cholesterol controlled. Negative for hepatitis C. Normal blood counts, kidney, and liver tests. Follow up in 6 months.

## 2017-07-05 NOTE — PROGRESS NOTES
Verified two patient identifiers, name and . Sylvia Roman provided with lab results.  No questions at this time

## 2017-08-21 ENCOUNTER — TELEPHONE (OUTPATIENT)
Dept: ONCOLOGY | Age: 67
End: 2017-08-21

## 2017-08-21 ENCOUNTER — DOCUMENTATION ONLY (OUTPATIENT)
Dept: ONCOLOGY | Age: 67
End: 2017-08-21

## 2017-08-21 NOTE — PROGRESS NOTES
525 [facility]  [address] [address]    Breast Cancer Survivorship Care Plan    GENERAL INFORMATION    NAME/AGE/GENDER: Mannie Barraza is a 74 y.o. female who was born on 1950. PHONE: [phone] (I)                 [phone] (M)    Date: 8/21/2017    Referring Provider: none    Patient Care Team:  Adalberto Hernandez MD as PCP - General (Internal Medicine) [facility] [phone]  Heriberto Irizarry MD as Surgeon (Breast Surgery) [address] 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 Rashid Nicholson or Dr. Leatha Gil scan (bone density study) Consider every two years, after baseline study, while on Letrozole Dr. Carol Ledesma     Please continue to see your primary care provider for all general health care recommended for a woman your age, including cancer screening tests.     Possible late and long-term effects that someone with this type of cancer and treatment may experience:  bone effects, elevated cholesterol, lymphedema, arthralgias / myalgias (joint / muscle aches and pains), vaginal dryness, and hot flashes    Schedule of Clinical Visits        Coordinating Provider When/How often Contact information   Primary Care Provider As needed for non-cancer health care [phone]     Medical Oncologist Every 3 to 6 months for the first 3 years, every 6 to 12 months for years 4 and 5, and annually thereafter [phone]   Radiation Oncologist Rotate visits with medical oncologist [phone]     Surgeon Rotate visits with medical oncologist [phone]       CANCER TREATMENT SUMMARY     Surgery: Date and procedure/location/findings: 17 Left lumpectomy with Left axillary sentinel lymph node biopsy, posterior margin positive for DCIS, other margins clear; 4 lymph nodes removed--all were negative for cancer    Radiation: Yes End date (year): 17, per Dr. Jodie Lowe office note   Body area treated/dosage: I do not have access to this information but it is important for your personal records. If you do not have a record of the areas that received radiation and the total dose of radiation you received, feel free to discuss this with Dr. Connie Rose at an upcoming follow-up appointment. Thank you!     Systemic Therapy (chemotherapy, biologics, other): No    Persistent symptoms or side effects at completion of treatment: No    Clinical Trial: No     Date of other cancer and/or recurrence of primary cancer and subsequent treatment:  Right breast cancer, treated with Right mastectomy and lymph node biopsy--no chemotherapy or radiation; per [facility]    FAMILIAL CANCER RISK ASSESSMENT    Family history of cancer: mother with breast cancer, ; sister with pancreatic cancer, ; brother with lung cancer,     Genetic/hereditary risk factor(s) or predisposing conditions: your young age at first diagnosis, and your history of second breast cancer and your mother's history of breast cancer  Genetic testing: No, but if you are interested in seeing if your would qualify for testing, please discuss with  Ese Lynn or Dr. Christy Teran at an upcoming appointment    CONTINUING TREATMENT    Need for Ongoing (Adjuvant) Treatment for Cancer: Yes, Letrozole 2.5 mg daily; began 5/1/17                Additional treatment name Possible side effects Planned duration   Anastrozole (Arimidex) or Letrozole (Femara) or Exemestane (Aromasin)   Joint aches and pains, vaginal dryness, loss of interest in sex, bone loss or thinning, bone fractures, elevated cholesterol, hot flashes Currently recommended for at least 5 years     DOING YOUR PART AS A CANCER SURVIVOR    Many survivors feel worried or anxious that the cancer will come back after treatment. While it often does not, its important to talk with your doctor about the possibility of the cancer returning. Most breast cancer recurrences are found by patients between visits. Tell your doctor if you notice any of the following symptoms, as they may be signs of a cancer recurrence:     · New lumps in the breast  · Bone pain  · Chest pain  · Abdominal pain  · Shortness of breath or difficulty breathing  · Persistent headaches  · Persistent coughing  · Rash on breast  · Nipple discharge (liquid coming from the nipple)    Or any other symptoms should be brought to the attention of your provider:   1. Anything that represents a brand new symptom   2. Anything that represents a persistent symptom   3. Anything you are worried about that might be related to the cancer coming back    Cancer survivors may experience issues with the areas listed below. If you have any concerns in these or other areas, please speak with your survivorship nurse practitioner or a member of your physician team to find out how to get help with them.     Emotional and mental health, fatigue, weight changes, stopping smoking, physical functioning, insurance, financial advice/assistance, school/work issues, parenting, memory or concentration loss, fertility, sexual functioning, or any other survivorship concerns General Guidelines for Health and Cancer Prevention/Risk Reduction      · Work to achieve and maintain a healthy weight  · Engage in regular physical activity including:  Aerobic exercise at least 150 minutes per week                            Strength training exercise at least 2 days per week    · Eat a diet that is high in vegetables, fruits, whole grains, legumes (beans) and low in saturated fats (animal fats)    · Quit/do not start using tobacco  · Limit alcohol consumption to no more than 1 drink per day for women/no more than 2 drinks per day for men    If you have any questions or need additional information/support, please discuss these recommendations with your doctor or nurse practitioner.         RESOURCES FOR THE JOURNEY    Breast Cancer Specific:  Living Beyond Breast Cancer www.lbbc.org  Breast Cancer. org NotSimilar.no. [facility] http://ww5.lalitha. [address] www.vbcf. org    General:  [employer]iv[employer] www.livestrong. [facility] www.cancer. [address] www.cancer. org  American Society of Clinical Oncology http://wenTSSI Systemskaur.[employer]/. net/research-and-advocacy/asco-care-and-treatment-  recommendations-patients/follow-care-breast-cancer  [facility] www.[facility]. [facility] [employer] www.canceradvocacy. [address] Adelina Mata www.nccn. org  Patient [address] www. patientadvocate. org  Cancer and Careers www.cancerandcareers. [address] [place] [address]  Cancer Survivorship www.[employer]. [employer]/cancersurvivorship    Prepared By: Paula Noel [address]  [phone] or [phone]  [email or web address]    Delivered on: 8/22/17    This [facility] is a cancer treatment summary and follow-up plan provided to you to keep with your healthcare records and to share with your healthcare providers.   This summary is a brief record of major aspects of your cancer treatment, not a detailed or comprehensive record of your care.

## 2017-08-21 NOTE — TELEPHONE ENCOUNTER
I will also send a copy to her PCP, Dr. Shanta Campa. Described what is included in the survivorship care plan and encouraged bill to call me with any questions she has once [email or web address] has reviewed the information or to set up a more formal opportunity to go over this and receive additional resources--either in a clinic visit or by phone. She is agreeable to this plan.

## 2017-09-08 ENCOUNTER — OFFICE VISIT (OUTPATIENT)
Dept: SURGERY | Age: 67
End: 2017-09-08

## 2017-09-08 VITALS
DIASTOLIC BLOOD PRESSURE: 100 MMHG | BODY MASS INDEX: 38.09 KG/M2 | SYSTOLIC BLOOD PRESSURE: 143 MMHG | HEART RATE: 68 BPM | HEIGHT: 66 IN | WEIGHT: 237 LBS

## 2017-09-08 DIAGNOSIS — C50.412 BREAST CANCER OF UPPER-OUTER QUADRANT OF LEFT FEMALE BREAST (HCC): Primary | ICD-10-CM

## 2017-09-08 DIAGNOSIS — Z98.890 S/P LUMPECTOMY, LEFT BREAST: ICD-10-CM

## 2017-09-08 NOTE — MR AVS SNAPSHOT
Visit Information Date & Time Provider Department Dept. Phone Encounter #  
 9/8/2017 10:30 AM Kaylynn Leos [facility] [phone] [id number] Your Appointments 11/15/2017 10:30 AM  
Any with Michelle Arizmendi MD  
[address] Oncology at [facility]) Appt Note: 6 mth f/u  
 [address] [facility]  
[phone]  
  
   
 [address] [address]  
  
    
 3/8/2018 10:00 AM  
Follow Up with Nilson Contreras NP [address] ([address]) Appt Note: 3 months follow up/cp?/St  
 [address] [address]  
[address] [facility] [address] [facility] Upcoming Health Maintenance Date Due DTaP/Tdap/Td series (1 - Tdap) 6/22/1973 FOBT Q 1 YEAR AGE 50-75 6/22/2002 GLAUCOMA SCREENING Q2Y 6/22/2017 MEDICARE YEARLY EXAM 6/22/2017 INFLUENZA AGE 9 TO ADULT 8/1/2017 BREAST CANCER SCRN MAMMOGRAM 11/22/2018 Pneumococcal 65+ High/Highest Risk (2 of 2 - PPSV23) 3/1/2022 Allergies as of 9/8/2017  Review Complete On: 9/8/2017 By: Nilson Contreras NP Severity Noted Reaction Type Reactions Other Medication  01/10/2017    Hives  
 holter monitor pads Current Immunizations  Reviewed on 6/21/2017 Name Date Pneumococcal Vaccine (Unspecified Type) 3/1/2017 Varicella Virus Vaccine 3/1/2017 Not reviewed this visit You Were Diagnosed With   
  
 Codes Comments Breast cancer of upper-outer quadrant of left female breast ([facility] [address]    -  Primary ICD-10-CM: O42.516 ICD-9-CM: 174.4 S/P lumpectomy, left breast     ICD-10-CM: L46.32 ICD-9-CM: V45.89 Vitals BP Pulse Height(growth percentile) Weight(growth percentile) BMI OB Status (!) 143/100 68 5' 6\" (1.676 m) 237 lb (107.5 kg) 38.25 kg/m2 Hysterectomy Smoking Status Never Smoker BMI and BSA Data Body Mass Index Body Surface Area  
 38.25 kg/m 2 2.24 m 2 Preferred Pharmacy Pharmacy Name Phone [facility] [address] [place] [address] [address] [phone] Your Updated Medication List  
  
   
This list is accurate as of: 9/8/17  2:02 PM.  Always use your most recent med list.  
  
  
  
  
 ASPIR-LOW 81 mg tablet Generic drug:  aspirin delayed-release Takes irregularly,usually weekly DAILY MULTI-VITAMINS/IRON tablet Generic drug:  multivitamin with iron Take 1 Tab by mouth daily. ergocalciferol 50,000 unit capsule Commonly known as:  ERGOCALCIFEROL Take 1 Cap by mouth every seven (7) days. letrozole 2.5 mg tablet Commonly known as:  [facility] Take 1 Tab by mouth daily. raNITIdine 150 mg tablet Commonly known as:  ZANTAC  
daily. simvastatin 20 mg tablet Commonly known as:  ZOCOR Take 1 Tab by mouth nightly. To-Do List   
 09/08/2017 Imaging:  [facility] MAMMO LT DX INCL CAD   
  
 12/20/2017 1:00 PM  
  Appointment with [address] [place] [address] Drive [facility] ([phone]) Patient Instructions Breast Self-Exam: Care Instructions Your Care Instructions A breast self-exam is when you check your breasts for lumps or changes. This regular exam helps you learn how your breasts normally look and feel. Most breast problems or changes are not because of cancer. Breast self-exam is not a substitute for a mammogram. Having regular breast exams by your doctor and regular mammograms improve your chances of finding any problems with your breasts. Some women set a time each month to do a step-by-step breast self-exam. Other women like a less formal system. They might look at their breasts as they brush their teeth, or feel their breasts once in a while in the shower. If you notice a change in your breast, tell your doctor. Follow-up care is a key part of your treatment and safety. Be sure to make and go to all appointments, and call your doctor if you are having problems. Its also a good idea to know your test results and keep a list of the medicines you take. How do you do a breast self-exam? 
· The best time to examine your breasts is usually one week after your menstrual period begins. Your breasts should not be tender then. If you do not have periods, you might do your exam on a day of the month that is easy to remember. · To examine your breasts: ¨ Remove all your clothes above the waist and lie down. When you are lying down, your breast tissue spreads evenly over your chest wall, which makes it easier to feel all your breast tissue. ¨ Use the padsnot the fingertipsof the 3 middle fingers of your left hand to check your right breast. Move your fingers slowly in small coin-sized circles that overlap. ¨ Use three levels of pressure to feel of all your breast tissue. Use light pressure to feel the tissue close to the skin surface. Use medium pressure to feel a little deeper. Use firm pressure to feel your tissue close to your breastbone and ribs. Use each pressure level to feel your breast tissue before moving on to the next spot. ¨ Check your entire breast, moving up and down as if following a strip from the collarbone to the bra line, and from the armpit to the ribs. Repeat until you have covered the entire breast. 
¨ Repeat this procedure for your left breast, using the pads of the 3 middle fingers of your right hand. · To examine your breasts while in the shower: 
¨ Place one arm over your head and lightly soap your breast on that side. ¨ Using the pads of your fingers, gently move your hand over your breast (in the strip pattern described above), feeling carefully for any lumps or changes.  
¨ Repeat for the other breast. 
 · Have your doctor inspect anything you notice to see if you need further testing. Where can you learn more? Go to http://janina-viviana.info/. Enter P148 in the search box to learn more about \"Breast Self-Exam: Care Instructions. \" Current as of: July 26, 2016 Content Version: 11.3 © 5398-4475 [employer]. Care instructions adapted under license by [employer] (which disclaims liability or warranty for this information). If you have questions about a medical condition or this instruction, always ask your healthcare professional. [address] any warranty or liability for your use of this information. Introducing [facility]! Mignon Dawn introduces [employer] patient portal. Now you can access parts of your medical record, email your doctor's office, and request medication refills online. 1. In your internet browser, go to [email or web address] 2. Click on the First Time User? Click Here link in the Sign In box. You will see the New Member Sign Up page. 3. Enter your [employer] Access Code exactly as it appears below. You will not need to use this code after youve completed the sign-up process. If you do not sign up before the expiration date, you must request a new code. · [employer] Access Code: [email or web address] Expires: 12/7/2017 10:33 AM 
 
4. Enter the last four digits of your Social Security Number (xxxx) and Date of Birth (mm/dd/yyyy) as indicated and click Submit. You will be taken to the next sign-up page. 5. Create a TripItt ID. This will be your [employer] login ID and cannot be changed, so think of one that is secure and easy to remember. 6. Create a [employer] password. You can change your password at any time. 7. Enter your Password Reset Question and Answer. This can be used at a later time if you forget your password. 8. Enter your e-mail address.  You will receive e-mail notification when new information is available in [employer]. 9. Click Sign Up. You can now view and download portions of your medical record. 10. Click the Download Summary menu link to download a portable copy of your medical information. If you have questions, please visit the Frequently Asked Questions section of the [employer] website. Remember, [employer] is NOT to be used for urgent needs. For medical emergencies, dial 911. Now available from your iPhone and Android! Please provide this summary of care documentation to your next provider. Your primary care clinician is listed as Gena Ramos. If you have any questions after today's visit, please call [phone].

## 2017-09-08 NOTE — PROGRESS NOTES
HISTORY OF PRESENT ILLNESS  Duong Torres is a 74 y.o. female. Breast Cancer     ESTABLISHED patient here today for follow up LEFT breast cancer. Has soreness to LEFT breast, mostly in the morning. Denies any other breast problems at this time. Currently taking Letrozole and tolerating well. History of breast cancer-  - RIGHT mastectomy. No chemo or radiation. - LEFT breast 1 cm mucinous carcinoma. 0/4 nodes, %. SC 80%, Her 2 negative  2016 LEFT lumpectomy, SLNB  17- 2ml old blood aspirated by Dr. Anastacia Guy  17- debridement  17- clot debridement with sutures placed  17- completed radiation. Followed by Dr. Kervin Purcell. 17- started Letrozole. Followed by Dr. Hero Ybarra.      Family history: Mother had breast cancer in her 66's, survived. Sister  from pancreatic cancer age 54    No imaging since surgery. ROS    Physical Exam   Constitutional: She appears well-developed and well-nourished. Pulmonary/Chest: Right breast exhibits no mass, no skin change and no tenderness. Left breast exhibits no inverted nipple, no mass, no nipple discharge, no skin change (skin darkening s/p radiation) and no tenderness. Musculoskeletal: Normal range of motion. UE x 2   Lymphadenopathy:     She has no cervical adenopathy. She has no axillary adenopathy. Right: No supraclavicular adenopathy present. Left: No supraclavicular adenopathy present. Skin: Skin is warm, dry and intact. Chest and breasts examined   Psychiatric: She has a normal mood and affect. Her speech is normal and behavior is normal.     Visit Vitals    BP (!) 143/100    Pulse 68    Ht 5' 6\" (1.676 m)    Wt 237 lb (107.5 kg)    BMI 38.25 kg/m2     ASSESSMENT and PLAN  Encounter Diagnoses   Name Primary?  Breast cancer of upper-outer quadrant of left female breast ([address] Yes    S/P lumpectomy, left breast      Normal exam with no evidence of local recurrence.   Her post-op course was complicated with wound healing issues, but area is completely healed at this time. Intermittent shooting pains and aches to left breast likely to due post radiation effects, scar tissue and nerve regeneration. She is taking letrozole and tolerating it well. She has follow-up with Dr. Lavonne King in 11/2017. She completed radiation and is due for a LDmammogram in 11/2017. She will plan to RTC here in 6 months or sooner PRN. Knitted knockers and prescription for bras given today.

## 2017-09-08 NOTE — PROGRESS NOTES
HISTORY OF PRESENT ILLNESS  Ashley Primjb is a 74 y.o. female. HPI   ESTABLISHED patient here today for follow up LEFT breast cancer. Has soreness to LEFT breast, mostly in the morning. Denies any other breast problems at this time. Currently taking Letrozole and tolerating well. History of breast cancer-  - RIGHT mastectomy. No chemo or radiation. - LEFT breast 1 cm mucinous carcinoma. 0/4 nodes, %. FL 80%, Her 2 negative  2016 LEFT lumpectomy, SLNB  17- 2ml old blood aspirated by Dr. Lambert Lesch  17- debridement  17- clot debridement with sutures placed  17- completed radiation. Followed by Dr. Heri Acosta. 17- started Letrozole. Followed by Dr. Phil Riley.      Family history: Mother had breast cancer in her 66's, survived. Sister  from pancreatic cancer age 54    No imaging since surgery.      [facility]    Physical Exam    ASSESSMENT and PLAN  {ASSESSMENT/PLAN:63832}

## 2017-09-08 NOTE — PATIENT INSTRUCTIONS
Breast Self-Exam: Care Instructions  Your Care Instructions  A breast self-exam is when you check your breasts for lumps or changes. This regular exam helps you learn how your breasts normally look and feel. Most breast problems or changes are not because of cancer. Breast self-exam is not a substitute for a mammogram. Having regular breast exams by your doctor and regular mammograms improve your chances of finding any problems with your breasts. Some women set a time each month to do a step-by-step breast self-exam. Other women like a less formal system. They might look at their breasts as they brush their teeth, or feel their breasts once in a while in the shower. If you notice a change in your breast, tell your doctor. Follow-up care is a key part of your treatment and safety. Be sure to make and go to all appointments, and call your doctor if you are having problems. Its also a good idea to know your test results and keep a list of the medicines you take. How do you do a breast self-exam?  · The best time to examine your breasts is usually one week after your menstrual period begins. Your breasts should not be tender then. If you do not have periods, you might do your exam on a day of the month that is easy to remember. · To examine your breasts:  ¨ Remove all your clothes above the waist and lie down. When you are lying down, your breast tissue spreads evenly over your chest wall, which makes it easier to feel all your breast tissue. ¨ Use the pads--not the fingertips--of the 3 middle fingers of your left hand to check your right breast. Move your fingers slowly in small coin-sized circles that overlap. ¨ Use three levels of pressure to feel of all your breast tissue. Use light pressure to feel the tissue close to the skin surface. Use medium pressure to feel a little deeper. Use firm pressure to feel your tissue close to your breastbone and ribs.  Use each pressure level to feel your breast tissue before moving on to the next spot. ¨ Check your entire breast, moving up and down as if following a strip from the collarbone to the bra line, and from the armpit to the ribs. Repeat until you have covered the entire breast.  ¨ Repeat this procedure for your left breast, using the pads of the 3 middle fingers of your right hand. · To examine your breasts while in the shower:  ¨ Place one arm over your head and lightly soap your breast on that side. ¨ Using the pads of your fingers, gently move your hand over your breast (in the strip pattern described above), feeling carefully for any lumps or changes. ¨ Repeat for the other breast.  · Have your doctor inspect anything you notice to see if you need further testing. Where can you learn more? Go to http://janina-viviana.info/. Enter P148 in the search box to learn more about \"Breast Self-Exam: Care Instructions. \"  Current as of: July 26, 2016  Content Version: 11.3  © 8005-6546 [employer]. Care instructions adapted under license by [employer] (which disclaims liability or warranty for this information). If you have questions about a medical condition or this instruction, always ask your healthcare professional. [place] [address] any warranty or liability for your use of this information.

## 2017-11-10 ENCOUNTER — HOSPITAL ENCOUNTER (OUTPATIENT)
Dept: MAMMOGRAPHY | Age: 67
Discharge: HOME | End: 2017-11-10
Attending: NURSE PRACTITIONER
Payer: MEDICARE

## 2017-11-10 DIAGNOSIS — Z98.890 S/P LUMPECTOMY, LEFT BREAST: ICD-10-CM

## 2017-11-10 DIAGNOSIS — C50.412 BREAST CANCER OF UPPER-OUTER QUADRANT OF LEFT FEMALE BREAST (HCC): ICD-10-CM

## 2017-11-10 PROCEDURE — 77065 DX MAMMO INCL CAD UNI: CPT

## 2017-11-15 ENCOUNTER — OFFICE VISIT (OUTPATIENT)
Dept: ONCOLOGY | Age: 67
End: 2017-11-15

## 2017-11-15 VITALS
DIASTOLIC BLOOD PRESSURE: 88 MMHG | WEIGHT: 241.4 LBS | HEIGHT: 66 IN | TEMPERATURE: 98.3 F | BODY MASS INDEX: 38.8 KG/M2 | SYSTOLIC BLOOD PRESSURE: 129 MMHG | HEART RATE: 81 BPM | RESPIRATION RATE: 16 BRPM | OXYGEN SATURATION: 98 %

## 2017-11-15 DIAGNOSIS — C50.412 MALIGNANT NEOPLASM OF UPPER-OUTER QUADRANT OF LEFT BREAST IN FEMALE, ESTROGEN RECEPTOR POSITIVE (HCC): Primary | ICD-10-CM

## 2017-11-15 DIAGNOSIS — M85.80 OSTEOPENIA, UNSPECIFIED LOCATION: ICD-10-CM

## 2017-11-15 DIAGNOSIS — E55.9 VITAMIN D DEFICIENCY: ICD-10-CM

## 2017-11-15 DIAGNOSIS — Z17.0 MALIGNANT NEOPLASM OF UPPER-OUTER QUADRANT OF LEFT BREAST IN FEMALE, ESTROGEN RECEPTOR POSITIVE (HCC): Primary | ICD-10-CM

## 2017-11-15 NOTE — PROGRESS NOTES
Mac Mohamud is a 74 y.o. female here today for left breast cancer f/u. On Letrozole. S/P Lumpectomy 1/2017. Completed Rads 4/2017. Prolia 6/21/17. Patient confirmed taking Vit D. VS stable. Patient denies pain. Patient denies N/V/D and constipation.

## 2017-11-17 NOTE — PROGRESS NOTES
Follow-up Note        Patient: Roz Wilkes MRN: [id number]  SSN: [id number]    YOB: 1950  Age: 74 y.o. Sex: female        Diagnosis:     1. 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 On Letrozole - started 5/1/2017  2. Completed radiation therapy on 4/28/2017  3. Lumpectomy on 1/18/2017      Subjective:      Roz Wilkes is a 74 y.o. female with a diagnosis of left sided breast carcinoma. She underwent a screening mammogram and was noted to have an abnormality in the left breast. Biopsy showed mucinous carcinoma which is ER and MN +ve. She was seen by Dr. Grady Kirk and then underwent left breast lumpectomy on 01/18/2017. She finished radiation therapy on 4/28/2017. She started hormonal therapy with Letrozole and is tolerating it well without side effects. She is has no complaints.         Review of Systems:    Constitutional: negative  Eyes: negative  Ears, Nose, Mouth, Throat, and Face: negative  Respiratory: negative  Cardiovascular: negative  Gastrointestinal: negative  Genitourinary:negative  Integument/Breast: negative  Hematologic/Lymphatic: negative  Musculoskeletal:negative  Neurological: negative        Past Medical History:   Diagnosis Date    Adenocarcinoma of breast ([facility] [address]     2017 LEFT     Breast cancer ([facility] [address]     1991 RIGHT    Cancer [facility]) [phone]     breast cancer right,left breast    GERD (gastroesophageal reflux disease)     Hypercholesterolemia     Radiation therapy complication 1247    left breast    Sleep apnea     possible, to have sleep study 1/26/2017     Past Surgical History:   Procedure Laterality Date    BREAST SURGERY PROCEDURE UNLISTED  1991    right mastectomy,lymph node biopsy    HX BREAST BIOPSY Left 2016    stereotatic    HX BREAST LUMPECTOMY Left 1/18/2017    LEFT BREAST LUMPECTOMY AND LEFT BREAST SENTINEL NODE BIOPSY WITH BLUE DYE performed by Wendy Groves MD at [address] [place] [address] hysterectomy/BSO    HX MASTECTOMY Right 1991      Family History   Problem Relation Age of Onset    Breast Cancer Mother     Dementia Mother     Diabetes Mother     Heart Disease Father     Stroke Father     Cancer Sister      pancreatic    Cancer Brother      lung    Diabetes Sister     Hypertension Sister      Social History   Substance Use Topics    Smoking status: Never Smoker    Smokeless tobacco: Never Used    Alcohol use No      Prior to Admission medications    Medication Sig Start Date End Date Taking? Authorizing Provider   simvastatin (ZOCOR) 20 mg tablet Take 1 Tab by mouth nightly. 6/8/17  Yes Chang Huggins MD   ergocalciferol (ERGOCALCIFEROL) 50,000 unit capsule Take 1 Cap by mouth every seven (7) days. 5/15/17  Yes Karlo Billings NP   letrozole (FEMARA) 2.5 mg tablet Take 1 Tab by mouth daily. 2/13/17  Yes Malena Alicia MD   ASPIR-LOW 81 mg tablet Takes irregularly,usually weekly 11/1/16  Yes Historical Provider   raNITIdine (ZANTAC) 150 mg tablet daily. 11/17/16  Yes Historical Provider   multivitamin with iron (DAILY MULTI-VITAMINS/IRON) tablet Take 1 Tab by mouth daily. Yes Historical Provider              Allergies   Allergen Reactions    Other Medication Hives     holter monitor pads           Objective:     Vitals:    11/15/17 1030   BP: 129/88   Pulse: 81   Resp: 16   Temp: 98.3 °F (36.8 °C)   TempSrc: Oral   SpO2: 98%   Weight: 241 lb 6.4 oz (109.5 kg)   Height: 5' 6\" (1.676 m)            Physical Exam:    GENERAL: alert, cooperative  EYE: negative  LYMPHATIC: Cervical, supraclavicular, and axillary nodes normal.   THROAT & NECK: normal and no erythema or exudates noted. LUNG: clear to auscultation bilaterally  HEART: regular rate and rhythm  ABDOMEN: soft, non-tender  EXTREMITIES: no cyanosis or edema  SKIN: Normal.  NEUROLOGIC: negative        Assessment:     1.  Left breast carcinoma:  pT1b N0 M0 (Stage IA) mucinous carcinoma, Tumor size 1 cm, LN -ve, grade 2, ER 100%, DC 80%, Her 2 -ve    ECOG PS 0  Intent of treatment - curative    S/P left sided lumpectomy 01/18/2017  Completed radiation therapy on 4/28/2017    On Letrozole  Tolerating well       Symptom management form reviewed with patient. 2. Osteopenia    Continue Prolia      3. Vitamin D deficiency    > Continue Vitamin D 50,000 units weekly      Plan:       > Continue Letrozole  > Continue Vitamin D  > Continue Prolia  > Follow-up appointment in 6 months        Signed by: Julito Rodgers MD                     November 17, 2017          CC. Mark Carvajal MD  CC.  Rafal Segovia MD

## 2018-03-08 ENCOUNTER — OFFICE VISIT (OUTPATIENT)
Dept: SURGERY | Age: 68
End: 2018-03-08

## 2018-03-08 VITALS
WEIGHT: 241 LBS | HEIGHT: 66 IN | HEART RATE: 83 BPM | DIASTOLIC BLOOD PRESSURE: 94 MMHG | SYSTOLIC BLOOD PRESSURE: 141 MMHG | BODY MASS INDEX: 38.73 KG/M2

## 2018-03-08 DIAGNOSIS — C50.412 MALIGNANT NEOPLASM OF UPPER-OUTER QUADRANT OF LEFT BREAST IN FEMALE, ESTROGEN RECEPTOR POSITIVE (HCC): Primary | ICD-10-CM

## 2018-03-08 DIAGNOSIS — Z98.890 S/P LUMPECTOMY, LEFT BREAST: ICD-10-CM

## 2018-03-08 DIAGNOSIS — Z17.0 MALIGNANT NEOPLASM OF UPPER-OUTER QUADRANT OF LEFT BREAST IN FEMALE, ESTROGEN RECEPTOR POSITIVE (HCC): Primary | ICD-10-CM

## 2018-03-08 NOTE — PATIENT INSTRUCTIONS
Breast Self-Exam: Care Instructions  Your Care Instructions    A breast self-exam is when you check your breasts for lumps or changes. This regular exam helps you learn how your breasts normally look and feel. Most breast problems or changes are not because of cancer. Breast self-exam is not a substitute for a mammogram. Having regular breast exams by your doctor and regular mammograms improve your chances of finding any problems with your breasts. Some women set a time each month to do a step-by-step breast self-exam. Other women like a less formal system. They might look at their breasts as they brush their teeth, or feel their breasts once in a while in the shower. If you notice a change in your breast, tell your doctor. Follow-up care is a key part of your treatment and safety. Be sure to make and go to all appointments, and call your doctor if you are having problems. It's also a good idea to know your test results and keep a list of the medicines you take. How do you do a breast self-exam?  · The best time to examine your breasts is usually one week after your menstrual period begins. Your breasts should not be tender then. If you do not have periods, you might do your exam on a day of the month that is easy to remember. · To examine your breasts:  ¨ Remove all your clothes above the waist and lie down. When you are lying down, your breast tissue spreads evenly over your chest wall, which makes it easier to feel all your breast tissue. ¨ Use the pads-not the fingertips-of the 3 middle fingers of your left hand to check your right breast. Move your fingers slowly in small coin-sized circles that overlap. ¨ Use three levels of pressure to feel of all your breast tissue. Use light pressure to feel the tissue close to the skin surface. Use medium pressure to feel a little deeper. Use firm pressure to feel your tissue close to your breastbone and ribs.  Use each pressure level to feel your breast tissue before moving on to the next spot. ¨ Check your entire breast, moving up and down as if following a strip from the collarbone to the bra line, and from the armpit to the ribs. Repeat until you have covered the entire breast.  ¨ Repeat this procedure for your left breast, using the pads of the 3 middle fingers of your right hand. · To examine your breasts while in the shower:  ¨ Place one arm over your head and lightly soap your breast on that side. ¨ Using the pads of your fingers, gently move your hand over your breast (in the strip pattern described above), feeling carefully for any lumps or changes. ¨ Repeat for the other breast.  · Have your doctor inspect anything you notice to see if you need further testing. Where can you learn more? Go to http://janina-viviana.info/. Enter P148 in the search box to learn more about \"Breast Self-Exam: Care Instructions. \"  Current as of: May 12, 2017  Content Version: 11.4  © 9005-6565 [employer]wise, Incorporated. Care instructions adapted under license by [employer] (which disclaims liability or warranty for this information). If you have questions about a medical condition or this instruction, always ask your healthcare professional. [place] [address] any warranty or liability for your use of this information.

## 2018-03-08 NOTE — MR AVS SNAPSHOT
Morgan Gilmore [address] [facility] 
[phone] Patient: Wyatt Escudero MRN: [id number] Visit Information Date & Time Provider Department Dept. Phone Encounter #  
 3/8/2018 10:00 AM Matthew Ojeda  [address] [id number] Your Appointments 5/23/2018 11:00 AM  
Any with Luzma Conway MD  
[address] [facility] at [facility]) Appt Note: 6 mth f/u  
 [address] [facility]  
[phone]  
  
   
 [address] [address]  
  
    
 9/6/2018 10:00 AM  
Follow Up with CARLINE Sal 22 ([facility]) Appt Note: 6 MONTH F/U CP$0 3/8/18 tt  
 [address] [phone]  
[address] [facility] [address] [place] [address] Upcoming Health Maintenance Date Due DTaP/Tdap/Td series (1 - Tdap) 6/22/1973 FOBT Q 1 YEAR AGE 50-75 6/22/2002 GLAUCOMA SCREENING Q2Y 6/22/2017 Influenza Age 5 to Adult 8/1/2017 BREAST CANCER SCRN MAMMOGRAM 11/10/2019 Pneumococcal 65+ High/Highest Risk (2 of 2 - PPSV23) 3/1/2022 Allergies as of 3/8/2018  Review Complete On: 3/8/2018 By: Oneil Murphy RN Severity Noted Reaction Type Reactions Other Medication  01/10/2017    Hives  
 holter monitor pads Current Immunizations  Reviewed on 6/21/2017 Name Date Pneumococcal Vaccine (Unspecified Type) 3/1/2017 Varicella Virus Vaccine 3/1/2017 Not reviewed this visit Vitals BP Pulse Height(growth percentile) Weight(growth percentile) BMI OB Status (!) 141/94 83 5' 6\" (1.676 m) 241 lb (109.3 kg) 38.9 kg/m2 Hysterectomy Smoking Status Never Smoker BMI and BSA Data Body Mass Index Body Surface Area 38.9 kg/m 2 2.26 m 2 Preferred Pharmacy Pharmacy Name Phone [facility] [address] [phone] Your Updated Medication List  
  
   
This list is accurate as of 3/8/18 10:05 AM.  Always use your most recent med list.  
  
  
  
  
 ASPIR-LOW 81 mg tablet Generic drug:  aspirin delayed-release Takes irregularly,usually weekly DAILY MULTI-VITAMINS/IRON tablet Generic drug:  multivitamin with iron Take 1 Tab by mouth daily. ergocalciferol 50,000 unit capsule Commonly known as:  ERGOCALCIFEROL Take 1 Cap by mouth every seven (7) days. letrozole 2.5 mg tablet Commonly known as:  [facility] Take 1 Tab by mouth daily. raNITIdine 150 mg tablet Commonly known as:  ZANTAC  
daily. simvastatin 20 mg tablet Commonly known as:  ZOCOR Take 1 Tab by mouth nightly. Patient Instructions Breast Self-Exam: Care Instructions Your Care Instructions A breast self-exam is when you check your breasts for lumps or changes. This regular exam helps you learn how your breasts normally look and feel. Most breast problems or changes are not because of cancer. Breast self-exam is not a substitute for a mammogram. Having regular breast exams by your doctor and regular mammograms improve your chances of finding any problems with your breasts. Some women set a time each month to do a step-by-step breast self-exam. Other women like a less formal system. They might look at their breasts as they brush their teeth, or feel their breasts once in a while in the shower. If you notice a change in your breast, tell your doctor. Follow-up care is a key part of your treatment and safety. Be sure to make and go to all appointments, and call your doctor if you are having problems.  It's also a good idea to know your test results and keep a list of the medicines you take. How do you do a breast self-exam? 
· The best time to examine your breasts is usually one week after your menstrual period begins. Your breasts should not be tender then. If you do not have periods, you might do your exam on a day of the month that is easy to remember. · To examine your breasts: ¨ Remove all your clothes above the waist and lie down. When you are lying down, your breast tissue spreads evenly over your chest wall, which makes it easier to feel all your breast tissue. ¨ Use the pads-not the fingertips-of the 3 middle fingers of your left hand to check your right breast. Move your fingers slowly in small coin-sized circles that overlap. ¨ Use three levels of pressure to feel of all your breast tissue. Use light pressure to feel the tissue close to the skin surface. Use medium pressure to feel a little deeper. Use firm pressure to feel your tissue close to your breastbone and ribs. Use each pressure level to feel your breast tissue before moving on to the next spot. ¨ Check your entire breast, moving up and down as if following a strip from the collarbone to the bra line, and from the armpit to the ribs. Repeat until you have covered the entire breast. 
¨ Repeat this procedure for your left breast, using the pads of the 3 middle fingers of your right hand. · To examine your breasts while in the shower: 
¨ Place one arm over your head and lightly soap your breast on that side. ¨ Using the pads of your fingers, gently move your hand over your breast (in the strip pattern described above), feeling carefully for any lumps or changes. ¨ Repeat for the other breast. 
· Have your doctor inspect anything you notice to see if you need further testing. Where can you learn more? Go to http://janina-viviana.info/. Enter P148 in the search box to learn more about \"Breast Self-Exam: Care Instructions. \" Current as of: May 12, 2017 Content Version: 11.4 © 0433-0531 Healthwise, Incorporated. Care instructions adapted under license by "[employer]" (which disclaims liability or warranty for this information). If you have questions about a medical condition or this instruction, always ask your healthcare professional. [address] any warranty or liability for your use of this information. Introducing [facility]! Mohamud Vang introduces [employer] patient portal. Now you can access parts of your medical record, email your doctor's office, and request medication refills online. 1. In your internet browser, go to [email or web address] 2. Click on the First Time User? Click Here link in the Sign In box. You will see the New Member Sign Up page. 3. Enter your [employer] Access Code exactly as it appears below. You will not need to use this code after youve completed the sign-up process. If you do not sign up before the expiration date, you must request a new code. · [employer] Access Code: [email or web address] Expires: 3/13/2018  2:40 PM 
 
4. Enter the last four digits of your Social Security Number (xxxx) and Date of Birth (mm/dd/yyyy) as indicated and click Submit. You will be taken to the next sign-up page. 5. Create a [employer] ID. This will be your [employer] login ID and cannot be changed, so think of one that is secure and easy to remember. 6. Create a [employer] password. You can change your password at any time. 7. Enter your Password Reset Question and Answer. This can be used at a later time if you forget your password. 8. Enter your e-mail address. You will receive e-mail notification when new information is available in [address] 9. Click Sign Up. You can now view and download portions of your medical record. 10. Click the Download Summary menu link to download a portable copy of your medical information.  
 
If you have questions, please visit the Frequently Asked Questions section of the [employer]. Remember, [employer] is NOT to be used for urgent needs. For medical emergencies, dial 911. Now available from your iPhone and Android! Please provide this summary of care documentation to your next provider. Your primary care clinician is listed as Liliam Maldonado. If you have any questions after today's visit, please call [phone].

## 2018-03-08 NOTE — PROGRESS NOTES
HISTORY OF PRESENT ILLNESS  Yeny Single is a 74 y.o. female. HPI   ESTABLISHED patient here today for follow up LEFT breast cancer. She has occasional soreness to LEFT breast. Denies any other breast problems at this time. Currently taking Letrozole and tolerating well.      History of breast cancer-  - RIGHT mastectomy.  No chemo or radiation. - LEFT breast 1 cm mucinous carcinoma. 0/4 nodes, %. PA 80%, Her 2 negative  2016 LEFT lumpectomy, SLNB  17- 2ml old blood aspirated by Dr. Denise Travis  17- debridement  17- clot debridement with sutures placed  17- completed radiation. Followed by Dr. Meredith Torres. 17- started Letrozole. Followed by Dr. Rut Vicente  Family history: Mother had breast cancer in her 66's, survived. Sister  from pancreatic cancer age 54    Recent imaging-  LEFT diagnostic mammogram on 11/10/17- BIRADS 2  IMPRESSION:  1. Postlumpectomy changes of the left breast.  2. Diagnostic mammography is recommended in one year. 3. The patient has been notified of these results and recommendations.    4. BI-RADS Category 2, benign       ROS    Physical Exam    ASSESSMENT and PLAN  {ASSESSMENT/PLAN:92009}

## 2018-03-08 NOTE — PROGRESS NOTES
HISTORY OF PRESENT ILLNESS  Jordin Bazan is a 74 y.o. female. Breast Cancer     ESTABLISHED patient here today for follow up LEFT breast cancer. She has occasional soreness to LEFT breast. Denies any other breast problems at this time. Currently taking Letrozole and tolerating well.      History of breast cancer-  - RIGHT mastectomy.  No chemo or radiation. - LEFT breast 1 cm mucinous carcinoma. 0/4 nodes, %. AR 80%, Her 2 negative  2016 LEFT lumpectomy, SLNB  17- 2ml old blood aspirated by Dr. Al Romero  17- debridement  17- clot debridement with sutures placed  17- completed radiation. Followed by Dr. Avel Kaur. 17- started Letrozole. Followed by Dr. Diony Rainey. OB History     Obstetric Comments    Menarche: 17. LMP:52  # of Children: 2. Age at Delivery of First Child:  24   Hysterectomy/oophorectomy:  Yes/one ovary removed. Breast Bx: yes   Hx of Breast Feeding:  no  BCP:  yes Hormone therapy: no.           Past Surgical History:   Procedure Laterality Date    BREAST SURGERY PROCEDURE UNLISTED      right mastectomy,lymph node biopsy    HX BREAST BIOPSY Left     stereotatic    HX BREAST LUMPECTOMY Left 2017    LEFT BREAST LUMPECTOMY AND LEFT BREAST SENTINEL NODE BIOPSY WITH BLUE DYE performed by Rodger Flores MD at [facility]    hysterectomy/BSO    HX MASTECTOMY Right        Family history: Mother had breast cancer in her 66's, survived. Sister  from pancreatic cancer age 54    Recent imaging-  LEFT diagnostic mammogram on 11/10/17- BIRADS 2  IMPRESSION:  1. Postlumpectomy changes of the left breast.  2. Diagnostic mammography is recommended in one year. 3. The patient has been notified of these results and recommendations. 4. BI-RADS Category 2, benign       ROS    Physical Exam   Constitutional: She appears well-developed and well-nourished. Pulmonary/Chest: Left breast exhibits tenderness.  Left breast exhibits no inverted nipple, no mass, no nipple discharge and no skin change. Musculoskeletal: Normal range of motion. UE x 2   Lymphadenopathy:     She has no cervical adenopathy. She has no axillary adenopathy. Right: No supraclavicular adenopathy present. Left: No supraclavicular adenopathy present. Skin: Skin is warm, dry and intact. Chest and breasts examined   Psychiatric: She has a normal mood and affect. Her speech is normal and behavior is normal.     Visit Vitals    BP (!) 141/94    Pulse 83    Ht 5' 6\" (1.676 m)    Wt 241 lb (109.3 kg)    BMI 38.9 kg/m2     ASSESSMENT and PLAN  Encounter Diagnoses   Name Primary?  Malignant neoplasm of upper-outer quadrant of left breast in female, estrogen receptor positive ([facility] [address] Yes    S/P lumpectomy, left breast      Normal exam and imaging with no evidence of local recurrence. Patient had a lumpectomy in 12/2016 and her post op course was complicated by a large hematoma requiring prolonged wound care. All is healed appropriately now with no residual issues. Left breast tenderness likely a result of treatment. She will continue on her letrozole and has follow-up with Dr. Angélica Molina. She will plan to RTC here in 6 months or sooner PRN and I anticipate a LDmammogram in 11/2018. She is comfortable with this plan. All questions answered and she stated understanding.

## 2018-03-22 ENCOUNTER — OFFICE VISIT (OUTPATIENT)
Dept: INTERNAL MEDICINE CLINIC | Age: 68
End: 2018-03-22

## 2018-03-22 VITALS
HEART RATE: 90 BPM | DIASTOLIC BLOOD PRESSURE: 85 MMHG | BODY MASS INDEX: 38.6 KG/M2 | HEIGHT: 66 IN | SYSTOLIC BLOOD PRESSURE: 130 MMHG | OXYGEN SATURATION: 100 % | RESPIRATION RATE: 16 BRPM | WEIGHT: 240.2 LBS | TEMPERATURE: 98.2 F

## 2018-03-22 DIAGNOSIS — Z17.0 MALIGNANT NEOPLASM OF UPPER-OUTER QUADRANT OF LEFT BREAST IN FEMALE, ESTROGEN RECEPTOR POSITIVE (HCC): ICD-10-CM

## 2018-03-22 DIAGNOSIS — E78.00 PURE HYPERCHOLESTEROLEMIA: ICD-10-CM

## 2018-03-22 DIAGNOSIS — M79.671 RIGHT FOOT PAIN: Primary | ICD-10-CM

## 2018-03-22 DIAGNOSIS — M85.88 OSTEOPENIA OF SPINE: ICD-10-CM

## 2018-03-22 DIAGNOSIS — E55.9 VITAMIN D DEFICIENCY: ICD-10-CM

## 2018-03-22 DIAGNOSIS — M79.671 PAIN OF RIGHT HEEL: ICD-10-CM

## 2018-03-22 DIAGNOSIS — R73.03 PREDIABETES: Primary | ICD-10-CM

## 2018-03-22 DIAGNOSIS — C50.412 MALIGNANT NEOPLASM OF UPPER-OUTER QUADRANT OF LEFT BREAST IN FEMALE, ESTROGEN RECEPTOR POSITIVE (HCC): ICD-10-CM

## 2018-03-22 DIAGNOSIS — R73.09 ELEVATED GLUCOSE: ICD-10-CM

## 2018-03-22 DIAGNOSIS — K21.9 GASTROESOPHAGEAL REFLUX DISEASE WITHOUT ESOPHAGITIS: ICD-10-CM

## 2018-03-22 RX ORDER — RANITIDINE 150 MG/1
150 TABLET, FILM COATED ORAL 2 TIMES DAILY
Qty: 180 TAB | Refills: 3 | Status: SHIPPED | OUTPATIENT
Start: 2018-03-22 | End: 2019-09-25

## 2018-03-22 NOTE — PROGRESS NOTES
Kirsten Hayes is a 74 y.o. female who presents for follow up on medications. Last seen June 2017. History of breast cancer in 1996 on right and second on left January 2017. Left lumpectomy, XRT, Letrozole. Sees Dr. Sesar Suresh, last appt Nov 2017. Osteopenia in LS spine, started on Prolia. Skipped the December injection, due to dental work. May have dental implants.       Taking zantac for GERD, with relief. [place] diet. Avoids caffeine.       Treated for HLP. Trying to follow low fat diet. On simvastatin, no diffuse aches,  some aches in right arm. The right arm will tire easily. Right breast surgery in 1991. No lymphedema.       In June 2017, HbA1C 6.0%. Trying to follow diet, but off at times. Mostly water. Some walking for exercise. Has right heel pain that is limiting activity.       JELLY with CPAP. Prior colon screening, normal. 9 years ago. Reports normal BM. Past Medical History:   Diagnosis Date    Adenocarcinoma of breast ([facility] [address]     2017 LEFT     Breast cancer ([facility] [address]     1991 RIGHT    Cancer [facility]) [id number]     breast cancer right,left breast    GERD (gastroesophageal reflux disease)     Hypercholesterolemia     Radiation therapy complication 4612    left breast    Sleep apnea     possible, to have sleep study 1/26/2017       Family History   Problem Relation Age of Onset    Breast Cancer Mother     Dementia Mother     Diabetes Mother     Heart Disease Father     Stroke Father     Cancer Sister      pancreatic    Cancer Brother      lung    Diabetes Sister     Hypertension Sister        Social History     Social History    Marital status:      Spouse name: N/A    Number of children: N/A    Years of education: N/A     Occupational History    Not on file.      Social History Main Topics    Smoking status: Never Smoker    Smokeless tobacco: Never Used    Alcohol use No    Drug use: No    Sexual activity: Yes     Partners: Male     Birth control/ protection: None     Other Topics Concern    Not on file     Social History Narrative       Current Outpatient Prescriptions on File Prior to Visit   Medication Sig Dispense Refill    simvastatin (ZOCOR) 20 mg tablet Take 1 Tab by mouth nightly. 30 Tab 11    letrozole (FEMARA) 2.5 mg tablet Take 1 Tab by mouth daily. 90 Tab 3    ASPIR-LOW 81 mg tablet Takes irregularly,usually weekly      multivitamin with iron (DAILY MULTI-VITAMINS/IRON) tablet Take 1 Tab by mouth daily. No current facility-administered medications on file prior to visit. Review of Systems  Pertinent items are noted in HPI. Objective:     Visit Vitals    /85 (BP 1 Location: Left arm, BP Patient Position: Sitting)    Pulse 90    Temp 98.2 °F (36.8 °C) (Oral)    Resp 16    Ht 5' 6\" (1.676 m)    Wt 240 lb 3.2 oz (109 kg)    SpO2 100%    BMI 38.77 kg/m2     Gen: well appearing female  HEENT:   PERRL,normal conjunctiva. External ear and canals normal, TMs no opacification or erythema,  OP no erythema, no exudates, MMM  Neck:   No masses or LAD  Resp:  No wheezing, no rhonchi, no rales. CV:  RRR, normal S1S2, no murmur. GI: soft, nontender, without masses. No hepatosplenomegaly. Extrem:  +2 pulses, no edema, warm distally      Assessment/Plan:     1. Gastroesophageal reflux disease without esophagitis    - raNITIdine (ZANTAC) 150 mg tablet; Take 1 Tab by mouth two (2) times a day. Dispense: 180 Tab; Refill: 3    2. Osteopenia of spine- Recommend weight bearing exercise, like walking. Calcium 1200mg daily, most from foods, less from supplement. Vitamin D3 2,000 iu once a day. 3. Malignant neoplasm of upper-outer quadrant of left breast in female, estrogen receptor positive (HCC)-followed by oncology    - METABOLIC PANEL, COMPREHENSIVE; Future  - CBC W/O DIFF; Future    4. Vitamin D deficiency    - METABOLIC PANEL, COMPREHENSIVE; Future    5.  Pure hypercholesterolemia    - METABOLIC PANEL, COMPREHENSIVE; Future  - LIPID PANEL; Future    6. Prediabetes- Recommend following diabetic diet, exercise and maintain a healthy weight.      - HEMOGLOBIN A1C W/O EAG; Future    7. Elevated glucose    - HEMOGLOBIN A1C W/O EAG; Future    8. Pain of right heel-recommend stretches. - XR CALCANEUS RT; Future  - REFERRAL TO PODIATRY    Follow-up Disposition:  Return for fasting labs 2 months.   Aleta Vieira MD

## 2018-03-22 NOTE — MR AVS SNAPSHOT
[address] 
[phone] Patient: Puneet Mata MRN: [id number] Visit Information Date & Time Provider Department Dept. Phone Encounter #  
 3/22/2018  3:00 PM Vinicio Blake, [address] [id number] Follow-up Instructions Return for fasting labs 2 months. .  
  
Your Appointments 5/23/2018 11:00 AM  
Any with Lauryn Celaya MD  
[address] [facility]) Appt Note: 6 mth f/u  
 [address]  
[phone]  
  
   
 [address] [address]  
  
    
 9/6/2018 10:00 AM  
Follow Up with CARLINE Barrett 22 ([facility]) Appt Note: 6 MONTH F/U CP$0 3/8/18 tt  
 [address] [address]  
[address] [facility] [address] Upcoming Health Maintenance Date Due DTaP/Tdap/Td series (1 - Tdap) 6/22/1973 FOBT Q 1 YEAR AGE 50-75 6/22/2002 GLAUCOMA SCREENING Q2Y 6/22/2017 Influenza Age 5 to Adult 8/1/2017 MEDICARE YEARLY EXAM 3/14/2018 BREAST CANCER SCRN MAMMOGRAM 11/10/2019 Pneumococcal 65+ High/Highest Risk (2 of 2 - PPSV23) 3/1/2022 Allergies as of 3/22/2018  Review Complete On: 3/22/2018 By: Vinicio Blake MD  
  
 Severity Noted Reaction Type Reactions Other Medication  01/10/2017    Hives  
 holter monitor pads Current Immunizations  Reviewed on 6/21/2017 Name Date Pneumococcal Vaccine (Unspecified Type) 3/1/2017 Varicella Virus Vaccine 3/1/2017 Not reviewed this visit You Were Diagnosed With   
  
 Codes Comments Prediabetes    -  Primary ICD-10-CM: R73.03 
ICD-9-CM: 790.29 Gastroesophageal reflux disease without esophagitis     ICD-10-CM: K21.9 ICD-9-CM: 530.81 Osteopenia of spine     ICD-10-CM: M85.88 ICD-9-CM: 733.90 Malignant neoplasm of upper-outer quadrant of left breast in female, estrogen receptor positive ([facility]     ICD-10-CM: C50.412, Z17.0 ICD-9-CM: 174.4, V86.0 Vitamin D deficiency     ICD-10-CM: E55.9 ICD-9-CM: 268.9 Pure hypercholesterolemia     ICD-10-CM: E78.00 ICD-9-CM: 272.0 Elevated glucose     ICD-10-CM: R73.09 
ICD-9-CM: 790.29 Pain of right heel     ICD-10-CM: M79.671 ICD-9-CM: 729.5 Vitals BP Pulse Temp Resp Height(growth percentile) Weight(growth percentile) 130/85 (BP 1 Location: Left arm, BP Patient Position: Sitting) 90 98.2 °F (36.8 °C) (Oral) 16 5' 6\" (1.676 m) 240 lb 3.2 oz (109 kg) SpO2 BMI OB Status Smoking Status 100% 38.77 kg/m2 Hysterectomy Never Smoker BMI and BSA Data Body Mass Index Body Surface Area 38.77 kg/m 2 2.25 m 2 Preferred Pharmacy Pharmacy Name Phone [address] [facility] [phone] Your Updated Medication List  
  
   
This list is accurate as of 3/22/18  3:38 PM.  Always use your most recent med list.  
  
  
  
  
 ASPIR-LOW 81 mg tablet Generic drug:  aspirin delayed-release Takes irregularly,usually weekly DAILY MULTI-VITAMINS/IRON tablet Generic drug:  multivitamin with iron Take 1 Tab by mouth daily. letrozole 2.5 mg tablet Commonly known as:  [facility] Take 1 Tab by mouth daily. raNITIdine 150 mg tablet Commonly known as:  ZANTAC Take 1 Tab by mouth two (2) times a day. simvastatin 20 mg tablet Commonly known as:  ZOCOR Take 1 Tab by mouth nightly. Prescriptions Printed Refills  
 raNITIdine (ZANTAC) 150 mg tablet 3 Sig: Take 1 Tab by mouth two (2) times a day. Class: Print Route: Oral  
  
We Performed the Following REFERRAL TO PODIATRY [REF90 Custom] Comments:  
 Right heel pain, with cyst.  
  
Follow-up Instructions Return for fasting labs 2 months. .  
  
To-Do List   
 03/22/2018 Lab:  CBC W/O DIFF   
  
 03/22/2018 Lab:  HEMOGLOBIN A1C W/O EAG   
  
 03/22/2018 Lab:  LIPID PANEL   
  
 03/22/2018 Lab:  METABOLIC PANEL, COMPREHENSIVE   
  
 03/22/2018 Imaging:  XR CALCANEUS RT Referral Information Referral ID Referred By Referred To  
  
 [id number] [facility] [address] Visits Status Start Date End Date 1 New Request 3/22/18 3/22/19 If your referral has a status of pending review or denied, additional information will be sent to support the outcome of this decision. Patient Instructions WEIGHT BEARING EXERCISE 
 
VITAMIN D3 2,000 iu DAILY CALCIUM 1200-1500MG DAILY. TAKE MOST FROM DIET AND NO MORE THAN 500-600MG FROM SUPPLEMENTS. Learning About Diabetes Food Guidelines Your Care Instructions Meal planning is important to manage diabetes. It helps keep your blood sugar at a target level (which you set with your doctor). You don't have to eat special foods. You can eat what your family eats, including sweets once in a while. But you do have to pay attention to how often you eat and how much you eat of certain foods. You may want to work with a dietitian or a certified diabetes educator (CDE) to help you plan meals and snacks. A dietitian or CDE can also help you lose weight if that is one of your goals. What should you know about eating carbs? Managing the amount of carbohydrate (carbs) you eat is an important part of healthy meals when you have diabetes. Carbohydrate is found in many foods. · Learn which foods have carbs. And learn the amounts of carbs in different foods.  
¨ Bread, cereal, pasta, and rice have about 15 grams of carbs in a serving. A serving is 1 slice of bread (1 ounce), ½ cup of cooked cereal, or 1/3 cup of cooked pasta or rice. ¨ Fruits have 15 grams of carbs in a serving. A serving is 1 small fresh fruit, such as an apple or orange; ½ of a banana; ½ cup of cooked or canned fruit; ½ cup of fruit juice; 1 cup of melon or raspberries; or 2 tablespoons of dried fruit. ¨ Milk and no-sugar-added yogurt have 15 grams of carbs in a serving. A serving is 1 cup of milk or 2/3 cup of no-sugar-added yogurt. ¨ Starchy vegetables have 15 grams of carbs in a serving. A serving is ½ cup of mashed potatoes or sweet potato; 1 cup winter squash; ½ of a small baked potato; ½ cup of cooked beans; or ½ cup cooked corn or green peas. · Learn how much carbs to eat each day and at each meal. A dietitian or CDE can teach you how to keep track of the amount of carbs you eat. This is called carbohydrate counting. · If you are not sure how to count carbohydrate grams, use the Plate Method to plan meals. It is a good, quick way to make sure that you have a balanced meal. It also helps you spread carbs throughout the day. ¨ Divide your plate by types of foods. Put non-starchy vegetables on half the plate, meat or other protein food on one-quarter of the plate, and a grain or starchy vegetable in the final quarter of the plate. To this you can add a small piece of fruit and 1 cup of milk or yogurt, depending on how many carbs you are supposed to eat at a meal. 
· Try to eat about the same amount of carbs at each meal. Do not \"save up\" your daily allowance of carbs to eat at one meal. 
· Proteins have very little or no carbs per serving. Examples of proteins are beef, chicken, turkey, fish, eggs, tofu, cheese, cottage cheese, and peanut butter. A serving size of meat is 3 ounces, which is about the size of a deck of cards.  Examples of meat substitute serving sizes (equal to 1 ounce of meat) are 1/4 cup of cottage cheese, 1 egg, 1 tablespoon of peanut butter, and ½ cup of tofu. How can you eat out and still eat healthy? · Learn to estimate the serving sizes of foods that have carbohydrate. If you measure food at home, it will be easier to estimate the amount in a serving of restaurant food. · If the meal you order has too much carbohydrate (such as potatoes, corn, or baked beans), ask to have a low-carbohydrate food instead. Ask for a salad or green vegetables. · If you use insulin, check your blood sugar before and after eating out to help you plan how much to eat in the future. · If you eat more carbohydrate at a meal than you had planned, take a walk or do other exercise. This will help lower your blood sugar. What else should you know? · Limit saturated fat, such as the fat from meat and dairy products. This is a healthy choice because people who have diabetes are at higher risk of heart disease. So choose lean cuts of meat and nonfat or low-fat dairy products. Use olive or canola oil instead of butter or shortening when cooking. · Don't skip meals. Your blood sugar may drop too low if you skip meals and take insulin or certain medicines for diabetes. · Check with your doctor before you drink alcohol. Alcohol can cause your blood sugar to drop too low. Alcohol can also cause a bad reaction if you take certain diabetes medicines. Follow-up care is a key part of your treatment and safety. Be sure to make and go to all appointments, and call your doctor if you are having problems. It's also a good idea to know your test results and keep a list of the medicines you take. Where can you learn more? Go to http://janina-viviana.info/. Enter L789 in the search box to learn more about \"Learning About Diabetes Food Guidelines. \" Current as of: March 13, 2017 Content Version: 11.4 © 0375-1582 Healthwise, Incorporated.  Care instructions adapted under license by [employer] (which disclaims liability or warranty for this information). If you have questions about a medical condition or this instruction, always ask your healthcare professional. [address] any warranty or liability for your use of this information. Achilles Tendon: Exercises Your Care Instructions Here are some examples of exercises for your achilles tendon. Start each exercise slowly. Ease off the exercise if you start to have pain. Your doctor or physical therapist will tell you when you can start these exercises and which ones will work best for you. How to do the exercises Toe stretch 1. Sit in a chair, and extend your affected leg so that your heel is on the floor. 2. With your hand, reach down and pull your big toe up and back. Pull toward your ankle and away from the floor. 3. Hold the position for at least 15 to 30 seconds. 4. Repeat 2 to 4 times a session, several times a day. Calf-plantar fascia stretch 1. Sit with your legs extended and knees straight. 2. Place a towel around your foot just under the toes. 3. Hold each end of the towel in each hand, with your hands above your knees. 4. Pull back with the towel so that your foot stretches toward you. 5. Hold the position for at least 15 to 30 seconds. 6. Repeat 2 to 4 times a session, up to 5 sessions a day. Floor stretch 1. Stand about 2 feet from a wall, and place your hands on the wall at about shoulder height. Or you can stand behind a chair, placing your hands on the back of it for balance. 2. Step back with the leg you want to stretch. Keep the leg straight, and press your heel into the floor with your toe turned slightly in. 
3. Lean forward, and bend your other leg slightly. Feel the stretch in the Achilles tendon of your back leg. Hold for at least 15 to 30 seconds. 4. Repeat 2 to 4 times a session, up to 5 sessions a day. Stair stretch 1. Stand with the balls of both feet on the edge of a step or curb (or a medium-sized phone book). With at least one hand, hold onto something solid for balance, such as a banister or handrail. 2. Keeping your affected leg straight, slowly let that heel hang down off of the step or curb until you feel a stretch in the back of your calf and/or Achilles area. Some of your weight should still be on the other leg. 3. Hold this position for at least 15 to 30 seconds. 4. Repeat 2 to 4 times a session, up to 5 times a day or whenever your Achilles tendon starts to feel tight. This stretch can also be done with your knee slightly bent. Strength exercise 1. This exercise will get you started on building strength after an Achilles tendon injury. Your doctor or physical therapist can help you move on to more challenging exercises as you heal and get stronger. 2. Stand on a step with your heel off the edge of the step. Hold on to a handrail or wall for balance. 3. Push up on your toes, then slowly count to 10 as you lower yourself back down until your heel is below the step. If it hurts to push up on your toes, try putting most of your weight on your other foot as you push up, or try using your arms to help you. If you can't do this exercise without causing pain, stop the exercise and talk to your doctor. 4. Repeat the exercise 8 to 12 times, half with the knee straight and half with the knee bent. Follow-up care is a key part of your treatment and safety. Be sure to make and go to all appointments, and call your doctor if you are having problems. It's also a good idea to know your test results and keep a list of the medicines you take. Where can you learn more? Go to http://janina-viviana.info/. Enter F140 in the search box to learn more about \"Achilles Tendon: Exercises. \" Current as of: March 21, 2017 Content Version: 11.4 © 1906-8515 Healthwise, Incorporated.  Care instructions adapted under license by [employer] (which disclaims liability or warranty for this information). If you have questions about a medical condition or this instruction, always ask your healthcare professional. [address] any warranty or liability for your use of this information. Introducing [facility]! [facility] introduces [employer] patient portal. Now you can access parts of your medical record, email your doctor's office, and request medication refills online. 1. In your internet browser, go to [email or web address] 2. Click on the First Time User? Click Here link in the Sign In box. You will see the New Member Sign Up page. 3. Enter your [employer] Access Code exactly as it appears below. You will not need to use this code after youve completed the sign-up process. If you do not sign up before the expiration date, you must request a new code. · [employer] Access Code: [email or web address] Expires: 6/20/2018  3:38 PM 
 
4. Enter the last four digits of your Social Security Number (xxxx) and Date of Birth (mm/dd/yyyy) as indicated and click Submit. You will be taken to the next sign-up page. 5. Create a [employer] ID. This will be your [employer] login ID and cannot be changed, so think of one that is secure and easy to remember. 6. Create a [employer] password. You can change your password at any time. 7. Enter your Password Reset Question and Answer. This can be used at a later time if you forget your password. 8. Enter your e-mail address. You will receive e-mail notification when new information is available in [address] 9. Click Sign Up. You can now view and download portions of your medical record. 10. Click the Download Summary menu link to download a portable copy of your medical information. If you have questions, please visit the Frequently Asked Questions section of the [employer] website. Remember, [employer] is NOT to be used for urgent needs. For medical emergencies, dial 911. Now available from your iPhone and Android! Please provide this summary of care documentation to your next provider. Your primary care clinician is listed as Harlene Fothergill. If you have any questions after today's visit, please call [phone].

## 2018-03-22 NOTE — PATIENT INSTRUCTIONS
WEIGHT BEARING EXERCISE    VITAMIN D3 2,000 iu DAILY    CALCIUM 1200-1500MG DAILY. TAKE MOST FROM DIET AND NO MORE THAN 500-600MG FROM SUPPLEMENTS. Learning About Diabetes Food Guidelines  Your Care Instructions    Meal planning is important to manage diabetes. It helps keep your blood sugar at a target level (which you set with your doctor). You don't have to eat special foods. You can eat what your family eats, including sweets once in a while. But you do have to pay attention to how often you eat and how much you eat of certain foods. You may want to work with a dietitian or a certified diabetes educator (CDE) to help you plan meals and snacks. A dietitian or CDE can also help you lose weight if that is one of your goals. What should you know about eating carbs? Managing the amount of carbohydrate (carbs) you eat is an important part of healthy meals when you have diabetes. Carbohydrate is found in many foods. · Learn which foods have carbs. And learn the amounts of carbs in different foods. ¨ Bread, cereal, pasta, and rice have about 15 grams of carbs in a serving. A serving is 1 slice of bread (1 ounce), ½ cup of cooked cereal, or 1/3 cup of cooked pasta or rice. ¨ Fruits have 15 grams of carbs in a serving. A serving is 1 small fresh fruit, such as an apple or orange; ½ of a banana; ½ cup of cooked or canned fruit; ½ cup of fruit juice; 1 cup of melon or raspberries; or 2 tablespoons of dried fruit. ¨ Milk and no-sugar-added yogurt have 15 grams of carbs in a serving. A serving is 1 cup of milk or 2/3 cup of no-sugar-added yogurt. ¨ Starchy vegetables have 15 grams of carbs in a serving. A serving is ½ cup of mashed potatoes or sweet potato; 1 cup winter squash; ½ of a small baked potato; ½ cup of cooked beans; or ½ cup cooked corn or green peas. · Learn how much carbs to eat each day and at each meal. A dietitian or CDE can teach you how to keep track of the amount of carbs you eat.  This is called carbohydrate counting. · If you are not sure how to count carbohydrate grams, use the Plate Method to plan meals. It is a good, quick way to make sure that you have a balanced meal. It also helps you spread carbs throughout the day. ¨ Divide your plate by types of foods. Put non-starchy vegetables on half the plate, meat or other protein food on one-quarter of the plate, and a grain or starchy vegetable in the final quarter of the plate. To this you can add a small piece of fruit and 1 cup of milk or yogurt, depending on how many carbs you are supposed to eat at a meal.  · Try to eat about the same amount of carbs at each meal. Do not \"save up\" your daily allowance of carbs to eat at one meal.  · Proteins have very little or no carbs per serving. Examples of proteins are beef, chicken, turkey, fish, eggs, tofu, cheese, cottage cheese, and peanut butter. A serving size of meat is 3 ounces, which is about the size of a deck of cards. Examples of meat substitute serving sizes (equal to 1 ounce of meat) are 1/4 cup of cottage cheese, 1 egg, 1 tablespoon of peanut butter, and ½ cup of tofu. How can you eat out and still eat healthy? · Learn to estimate the serving sizes of foods that have carbohydrate. If you measure food at home, it will be easier to estimate the amount in a serving of restaurant food. · If the meal you order has too much carbohydrate (such as potatoes, corn, or baked beans), ask to have a low-carbohydrate food instead. Ask for a salad or green vegetables. · If you use insulin, check your blood sugar before and after eating out to help you plan how much to eat in the future. · If you eat more carbohydrate at a meal than you had planned, take a walk or do other exercise. This will help lower your blood sugar. What else should you know? · Limit saturated fat, such as the fat from meat and dairy products.  This is a healthy choice because people who have diabetes are at higher risk of heart disease. So choose lean cuts of meat and nonfat or low-fat dairy products. Use olive or canola oil instead of butter or shortening when cooking. · Don't skip meals. Your blood sugar may drop too low if you skip meals and take insulin or certain medicines for diabetes. · Check with your doctor before you drink alcohol. Alcohol can cause your blood sugar to drop too low. Alcohol can also cause a bad reaction if you take certain diabetes medicines. Follow-up care is a key part of your treatment and safety. Be sure to make and go to all appointments, and call your doctor if you are having problems. It's also a good idea to know your test results and keep a list of the medicines you take. Where can you learn more? Go to http://janina-viviana.info/. Enter V702 in the search box to learn more about \"Learning About Diabetes Food Guidelines. \"  Current as of: March 13, 2017  Content Version: 11.4  © 9963-8182 [employer]. Care instructions adapted under license by [employer] (which disclaims liability or warranty for this information). If you have questions about a medical condition or this instruction, always ask your healthcare professional. [place] [address] any warranty or liability for your use of this information. Achilles Tendon: Exercises  Your Care Instructions  Here are some examples of exercises for your achilles tendon. Start each exercise slowly. Ease off the exercise if you start to have pain. Your doctor or physical therapist will tell you when you can start these exercises and which ones will work best for you. How to do the exercises  Toe stretch    1. Sit in a chair, and extend your affected leg so that your heel is on the floor. 2. With your hand, reach down and pull your big toe up and back. Pull toward your ankle and away from the floor. 3. Hold the position for at least 15 to 30 seconds.   4. Repeat 2 to 4 times a session, several times a day. Calf-plantar fascia stretch    1. Sit with your legs extended and knees straight. 2. Place a towel around your foot just under the toes. 3. Hold each end of the towel in each hand, with your hands above your knees. 4. Pull back with the towel so that your foot stretches toward you. 5. Hold the position for at least 15 to 30 seconds. 6. Repeat 2 to 4 times a session, up to 5 sessions a day. Floor stretch    1. Stand about 2 feet from a wall, and place your hands on the wall at about shoulder height. Or you can stand behind a chair, placing your hands on the back of it for balance. 2. Step back with the leg you want to stretch. Keep the leg straight, and press your heel into the floor with your toe turned slightly in.  3. Lean forward, and bend your other leg slightly. Feel the stretch in the Achilles tendon of your back leg. Hold for at least 15 to 30 seconds. 4. Repeat 2 to 4 times a session, up to 5 sessions a day. Stair stretch    1. Stand with the balls of both feet on the edge of a step or curb (or a medium-sized phone book). With at least one hand, hold onto something solid for balance, such as a banister or handrail. 2. Keeping your affected leg straight, slowly let that heel hang down off of the step or curb until you feel a stretch in the back of your calf and/or Achilles area. Some of your weight should still be on the other leg. 3. Hold this position for at least 15 to 30 seconds. 4. Repeat 2 to 4 times a session, up to 5 times a day or whenever your Achilles tendon starts to feel tight. This stretch can also be done with your knee slightly bent. Strength exercise    1. This exercise will get you started on building strength after an Achilles tendon injury. Your doctor or physical therapist can help you move on to more challenging exercises as you heal and get stronger. 2. Stand on a step with your heel off the edge of the step.  Hold on to a handrail or wall for balance. 3. Push up on your toes, then slowly count to 10 as you lower yourself back down until your heel is below the step. If it hurts to push up on your toes, try putting most of your weight on your other foot as you push up, or try using your arms to help you. If you can't do this exercise without causing pain, stop the exercise and talk to your doctor. 4. Repeat the exercise 8 to 12 times, half with the knee straight and half with the knee bent. Follow-up care is a key part of your treatment and safety. Be sure to make and go to all appointments, and call your doctor if you are having problems. It's also a good idea to know your test results and keep a list of the medicines you take. Where can you learn more? Go to http://janina-viviana.info/. Enter N395 in the search box to learn more about \"Achilles Tendon: Exercises. \"  Current as of: March 21, 2017  Content Version: 11.4  © 8005-6269 [employer]. Care instructions adapted under license by [employer] (which disclaims liability or warranty for this information). If you have questions about a medical condition or this instruction, always ask your healthcare professional. [address] any warranty or liability for your use of this information.

## 2018-03-22 NOTE — PROGRESS NOTES
Reviewed record in preparation for visit and have obtained necessary documentation. Identified pt with two pt identifiers(name and ). Chief Complaint   Patient presents with    Cholesterol Problem     Follow up    Medication Evaluation     Follow up       Health Maintenance Due   Topic Date Due    DTaP/Tdap/Td  (1 - Tdap) 1973    Stool testing for trace blood  2002    Glaucoma Screening   2017    Flu Vaccine  2017    Annual Well Visit  2018       Ms. Deysi Chapin has a reminder for a \"due or due soon\" health maintenance. I have asked that she discuss this further with her primary care provider for follow-up on this health maintenance. Coordination of Care Questionnaire:  :     1) Have you been to an emergency room, urgent care clinic since your last visit? no   Hospitalized since your last visit? no             2) Have you seen or consulted any other health care providers outside of [address] [place] [address] since your last visit? no  (Include any pap smears or colon screenings in this section.)    3) In the event something were to happen to you and you were unable to speak on your behalf, do you have an Advance Directive/ Living Will in place stating your wishes? NO    Do you have an Advance Directive on file? no    4) Are you interested in receiving information on Advance Directives? YES    Patient is accompanied by self I have received verbal consent from Angel Doan to discuss any/all medical information while they are present in the room.

## 2018-04-12 RX ORDER — LETROZOLE 2.5 MG/1
2.5 TABLET, FILM COATED ORAL DAILY
Qty: 90 TAB | Refills: 3 | Status: SHIPPED | OUTPATIENT
Start: 2018-04-12 | End: 2018-04-26 | Stop reason: SDUPTHER

## 2018-04-12 NOTE — TELEPHONE ENCOUNTER
PER DONITA from Dr. Eleazar Abdul LETROZOLE 2.5MG [facility]) ONE TAB ONCE A DAY QUANTITY 90 REFILL 3.

## 2018-04-26 RX ORDER — LETROZOLE 2.5 MG/1
2.5 TABLET, FILM COATED ORAL DAILY
Qty: 90 TAB | Refills: 3 | Status: SHIPPED | OUTPATIENT
Start: 2018-04-26 | End: 2018-12-03 | Stop reason: SDUPTHER

## 2018-05-02 RX ORDER — LETROZOLE 2.5 MG/1
TABLET, FILM COATED ORAL
Qty: 90 TAB | Refills: 0 | Status: SHIPPED | OUTPATIENT
Start: 2018-05-02 | End: 2019-07-10 | Stop reason: SDUPTHER

## 2018-05-02 RX ORDER — LETROZOLE 2.5 MG/1
TABLET, FILM COATED ORAL
Qty: 90 TAB | Refills: 0 | Status: SHIPPED | OUTPATIENT
Start: 2018-05-02 | End: 2018-12-03 | Stop reason: SDUPTHER

## 2018-05-18 ENCOUNTER — APPOINTMENT (OUTPATIENT)
Dept: INTERNAL MEDICINE CLINIC | Age: 68
End: 2018-05-18

## 2018-05-18 ENCOUNTER — HOSPITAL ENCOUNTER (OUTPATIENT)
Dept: LAB | Age: 68
Discharge: HOME | End: 2018-05-18
Payer: MEDICARE

## 2018-05-18 DIAGNOSIS — R73.03 PREDIABETES: ICD-10-CM

## 2018-05-18 DIAGNOSIS — C50.412 MALIGNANT NEOPLASM OF UPPER-OUTER QUADRANT OF LEFT BREAST IN FEMALE, ESTROGEN RECEPTOR POSITIVE (HCC): ICD-10-CM

## 2018-05-18 DIAGNOSIS — R73.09 ELEVATED GLUCOSE: ICD-10-CM

## 2018-05-18 DIAGNOSIS — E78.00 PURE HYPERCHOLESTEROLEMIA: ICD-10-CM

## 2018-05-18 DIAGNOSIS — E55.9 VITAMIN D DEFICIENCY: ICD-10-CM

## 2018-05-18 DIAGNOSIS — Z17.0 MALIGNANT NEOPLASM OF UPPER-OUTER QUADRANT OF LEFT BREAST IN FEMALE, ESTROGEN RECEPTOR POSITIVE (HCC): ICD-10-CM

## 2018-05-18 PROCEDURE — 85027 COMPLETE CBC AUTOMATED: CPT

## 2018-05-18 PROCEDURE — 80061 LIPID PANEL: CPT

## 2018-05-18 PROCEDURE — 36415 COLL VENOUS BLD VENIPUNCTURE: CPT

## 2018-05-18 PROCEDURE — 83036 HEMOGLOBIN GLYCOSYLATED A1C: CPT

## 2018-05-18 PROCEDURE — 80053 COMPREHEN METABOLIC PANEL: CPT

## 2018-05-18 NOTE — LETTER
5/31/2018 7:40 AM 
 
Ms. Jose David Drake [address] [place] [id number] Dear Jose David Drake: Please find your most recent results below. Resulted Orders METABOLIC PANEL, COMPREHENSIVE Result Value Ref Range Glucose 92 65 - 99 mg/dL BUN 12 8 - 27 mg/dL Creatinine 0.93 0.57 - 1.00 mg/dL GFR est non-AA 65 >59 mL/min/1.73 GFR est AA 75 >59 mL/min/1.73  
 BUN/Creatinine ratio 13 12 - 28 Sodium 140 134 - 144 mmol/L Potassium 4.4 3.5 - 5.2 mmol/L Chloride 102 96 - 106 mmol/L  
 CO2 24 18 - 29 mmol/L Calcium 9.8 8.7 - 10.3 mg/dL Protein, total 7.7 6.0 - 8.5 g/dL Albumin 4.4 3.6 - 4.8 g/dL GLOBULIN, TOTAL 3.3 1.5 - 4.5 g/dL A-G Ratio 1.3 1.2 - 2.2 Bilirubin, total 0.5 0.0 - 1.2 mg/dL Alk. phosphatase 87 39 - 117 IU/L  
 AST (SGOT) 12 0 - 40 IU/L  
 ALT (SGPT) 17 0 - 32 IU/L Narrative Performed at:  [address]  [id number] : Irving Pak MD, Phone:  [phone] LIPID PANEL Result Value Ref Range Cholesterol, total 202 (H) 100 - 199 mg/dL Triglyceride 109 0 - 149 mg/dL HDL Cholesterol 44 >39 mg/dL VLDL, calculated 22 5 - 40 mg/dL LDL, calculated 136 (H) 0 - 99 mg/dL Narrative Performed at:  [address]  [id number] : Irving Pak MD, Phone:  [phone] HEMOGLOBIN A1C W/O EAG Result Value Ref Range Hemoglobin A1c 5.9 (H) 4.8 - 5.6 % Comment:  
            Pre-diabetes: 5.7 - 6.4 Diabetes: >6.4 Glycemic control for adults with diabetes: <7.0 Narrative Performed at:  [address]  [id number] : Irving Pak MD, Phone:  [phone] CBC W/O DIFF Result Value Ref Range WBC 3.8 3.4 - 10.8 x10E3/uL  
 RBC 4.51 3.77 - 5.28 x10E6/uL HGB 13.5 11.1 - 15.9 g/dL HCT 40.1 34.0 - 46.6 %  MCV 89 79 - 97 fL  
 MCH 29.9 26.6 - 33.0 pg  
 MCHC 33.7 31.5 - 35.7 g/dL  
 RDW 15.5 (H) 12.3 - 15.4 % PLATELET 463 432 - 964 x10E3/uL Narrative Performed at:  [address]  [id number] : Hannah Mccallum MD, Phone:  [phone] RECOMMENDATIONS: 
Labs are normal except mildly elevated LDL cholesterol 136. Goal is less than 100.  Labs show you are prediabetic.  Recommend follow low fat, low sugar, lower carb diet and exercise as tolerated. Follow up 6 months on prediabetes. Please call me if you have any questions: [phone] Sincerely, 
 
 
Rosemary Downey

## 2018-05-19 LAB
ALBUMIN SERPL-MCNC: 4.4 G/DL (ref 3.6–4.8)
ALBUMIN/GLOB SERPL: 1.3 {RATIO} (ref 1.2–2.2)
ALP SERPL-CCNC: 87 IU/L (ref 39–117)
ALT SERPL-CCNC: 17 IU/L (ref 0–32)
AST SERPL-CCNC: 12 IU/L (ref 0–40)
BILIRUB SERPL-MCNC: 0.5 MG/DL (ref 0–1.2)
BUN SERPL-MCNC: 12 MG/DL (ref 8–27)
BUN/CREAT SERPL: 13 (ref 12–28)
CALCIUM SERPL-MCNC: 9.8 MG/DL (ref 8.7–10.3)
CHLORIDE SERPL-SCNC: 102 MMOL/L (ref 96–106)
CHOLEST SERPL-MCNC: 202 MG/DL (ref 100–199)
CO2 SERPL-SCNC: 24 MMOL/L (ref 18–29)
CREAT SERPL-MCNC: 0.93 MG/DL (ref 0.57–1)
ERYTHROCYTE [DISTWIDTH] IN BLOOD BY AUTOMATED COUNT: 15.5 % (ref 12.3–15.4)
GFR SERPLBLD CREATININE-BSD FMLA CKD-EPI: 65 ML/MIN/1.73
GFR SERPLBLD CREATININE-BSD FMLA CKD-EPI: 75 ML/MIN/1.73
GLOBULIN SER CALC-MCNC: 3.3 G/DL (ref 1.5–4.5)
GLUCOSE SERPL-MCNC: 92 MG/DL (ref 65–99)
HBA1C MFR BLD: 5.9 % (ref 4.8–5.6)
HCT VFR BLD AUTO: 40.1 % (ref 34–46.6)
HDLC SERPL-MCNC: 44 MG/DL
HGB BLD-MCNC: 13.5 G/DL (ref 11.1–15.9)
LDLC SERPL CALC-MCNC: 136 MG/DL (ref 0–99)
MCH RBC QN AUTO: 29.9 PG (ref 26.6–33)
MCHC RBC AUTO-ENTMCNC: 33.7 G/DL (ref 31.5–35.7)
MCV RBC AUTO: 89 FL (ref 79–97)
PLATELET # BLD AUTO: 210 X10E3/UL (ref 150–379)
POTASSIUM SERPL-SCNC: 4.4 MMOL/L (ref 3.5–5.2)
PROT SERPL-MCNC: 7.7 G/DL (ref 6–8.5)
RBC # BLD AUTO: 4.51 X10E6/UL (ref 3.77–5.28)
SODIUM SERPL-SCNC: 140 MMOL/L (ref 134–144)
TRIGL SERPL-MCNC: 109 MG/DL (ref 0–149)
VLDLC SERPL CALC-MCNC: 22 MG/DL (ref 5–40)
WBC # BLD AUTO: 3.8 X10E3/UL (ref 3.4–10.8)

## 2018-05-23 ENCOUNTER — OFFICE VISIT (OUTPATIENT)
Dept: ONCOLOGY | Age: 68
End: 2018-05-23

## 2018-05-23 VITALS
RESPIRATION RATE: 16 BRPM | TEMPERATURE: 98.5 F | WEIGHT: 239.2 LBS | HEIGHT: 66 IN | OXYGEN SATURATION: 97 % | HEART RATE: 77 BPM | BODY MASS INDEX: 38.44 KG/M2 | SYSTOLIC BLOOD PRESSURE: 130 MMHG | DIASTOLIC BLOOD PRESSURE: 87 MMHG

## 2018-05-23 DIAGNOSIS — C50.412 MALIGNANT NEOPLASM OF UPPER-OUTER QUADRANT OF LEFT BREAST IN FEMALE, ESTROGEN RECEPTOR POSITIVE (HCC): Primary | ICD-10-CM

## 2018-05-23 DIAGNOSIS — Z17.0 MALIGNANT NEOPLASM OF UPPER-OUTER QUADRANT OF LEFT BREAST IN FEMALE, ESTROGEN RECEPTOR POSITIVE (HCC): Primary | ICD-10-CM

## 2018-05-23 DIAGNOSIS — E55.9 VITAMIN D DEFICIENCY: ICD-10-CM

## 2018-05-23 PROBLEM — E66.01 SEVERE OBESITY (BMI 35.0-39.9) WITH COMORBIDITY (HCC): Status: ACTIVE | Noted: 2018-05-23

## 2018-05-23 RX ORDER — ERGOCALCIFEROL 1.25 MG/1
50000 CAPSULE ORAL
Qty: 4 CAP | Refills: 3 | Status: SHIPPED | OUTPATIENT
Start: 2018-05-23 | End: 2019-09-25

## 2018-05-23 NOTE — PROGRESS NOTES
Follow-up Note        Patient: Jose David Drake MRN: [id number]  SSN: [id number]    YOB: 1950  Age: 74 y.o. Sex: female        Diagnosis:     1. 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 On Letrozole - started 5/1/2017  2. Completed radiation therapy on 4/28/2017  3. Lumpectomy on 1/18/2017      Subjective:      Jose David Drake is a 74 y.o. female with a diagnosis of left sided breast carcinoma. She underwent a screening mammogram and was noted to have an abnormality in the left breast. Biopsy showed mucinous carcinoma which is ER and OH +ve. She was seen by Dr. Dhaval Whelan and then underwent left breast lumpectomy on 01/18/2017. She finished radiation therapy on 4/28/2017. She started hormonal therapy with Letrozole and is tolerating it well without side effects. She is receiving Prolia but has delayed this because she is having dental implants.       Review of Systems:    Constitutional: negative  Eyes: negative  Ears, Nose, Mouth, Throat, and Face: negative  Respiratory: negative  Cardiovascular: negative  Gastrointestinal: negative  Genitourinary:negative  Integument/Breast: negative  Hematologic/Lymphatic: negative  Musculoskeletal:negative  Neurological: negative        Past Medical History:   Diagnosis Date    Adenocarcinoma of breast ([address]     2017 LEFT     Breast cancer ([address]     1991 RIGHT    Cancer [facility]) [phone]     breast cancer right,left breast    GERD (gastroesophageal reflux disease)     Hypercholesterolemia     Radiation therapy complication 3674    left breast    Sleep apnea     possible, to have sleep study 1/26/2017     Past Surgical History:   Procedure Laterality Date    BREAST SURGERY PROCEDURE UNLISTED  1991    right mastectomy,lymph node biopsy    HX BREAST BIOPSY Left 2016    stereotatic    HX BREAST LUMPECTOMY Left 1/18/2017    LEFT BREAST LUMPECTOMY AND LEFT BREAST SENTINEL NODE BIOPSY WITH BLUE DYE performed by Kymberly Otoole MD at [facility]    hysterectomy/BSO   [facility] MASTECTOMY Right 1991      Family History   Problem Relation Age of Onset    Breast Cancer Mother     Dementia Mother     Diabetes Mother     Heart Disease Father     Stroke Father     Cancer Sister      pancreatic    Cancer Brother      lung    Diabetes Sister     Hypertension Sister      Social History   Substance Use Topics    Smoking status: Never Smoker    Smokeless tobacco: Never Used    Alcohol use No      Prior to Admission medications    Medication Sig Start Date End Date Taking? Authorizing Provider   letrozole [facility]) 2.5 mg tablet TAKE 1 TABLET BY MOUTH EVERY DAY 5/2/18  Yes Rosanna Wiley MD   letrozole [facility]) 2.5 mg tablet TAKE 1 TABLET BY MOUTH EVERY DAY 5/2/18  Yes Rosanna Wiley MD   letrozole [facility]) 2.5 mg tablet Take 1 Tab by mouth daily. 4/26/18  Yes Rosanna Wiley MD   raNITIdine (ZANTAC) 150 mg tablet Take 1 Tab by mouth two (2) times a day. 3/22/18  Yes Naeem Olivo MD   simvastatin (ZOCOR) 20 mg tablet Take 1 Tab by mouth nightly. 6/8/17  Yes Naeem Olivo MD   ASPIR-LOW 81 mg tablet Takes irregularly,usually weekly 11/1/16  Yes Historical Provider   multivitamin with iron (DAILY MULTI-VITAMINS/IRON) tablet Take 1 Tab by mouth daily. Yes Historical Provider              Allergies   Allergen Reactions    Other Medication Hives     holter monitor pads           Objective:     Vitals:    05/23/18 1118   BP: 130/87   Pulse: 77   Resp: 16   Temp: 98.5 °F (36.9 °C)   SpO2: 97%   Weight: 239 lb 3.2 oz (108.5 kg)   Height: 5' 6\" (1.676 m)          Physical Exam:    GENERAL: alert, cooperative  EYE: negative  LYMPHATIC: Cervical, supraclavicular, and axillary nodes normal.   THROAT & NECK: normal and no erythema or exudates noted.    LUNG: clear to auscultation bilaterally  HEART: regular rate and rhythm  ABDOMEN: soft, non-tender  EXTREMITIES: no cyanosis or edema  SKIN: Normal.  NEUROLOGIC: negative        Assessment:     1. 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 2. Osteopenia    DEXA (2/12/2017): osteopenic  Continue Prolia - on hold for dental work      3. Vitamin D deficiency    > Continue Vitamin D 50,000 units weekly  > Recheck Vitamin D today      Plan:       > Continue Letrozole  > Continue Vitamin D  > Recheck Vitamin D  > Continue Prolia - on hold for dental work  > Follow-up appointment in 6 months        Signed by: Rafat Mcelroy MD                     May 23, 2018          CC. Karen Macias MD  CC.  Luis Alberto Melo MD

## 2018-05-23 NOTE — PROGRESS NOTES
Rajeev Eaton is a 74 y.o. female here today for left sided breast cancer f/u. S/p lumpectomy. Patient on  Letrozole; started 5/1/17. DEXA; 2/12/17-Osteopenic; on Prolia. Patient states she's taking OTC Vitamin D; pt states she's okay with NP writing a script for the vitamin D. VS stable. Patient denies pain. Good appetite. Patient denies N/V/D and constipation. Patient states she has numbness and tingling in her fingers at times. Patient denies mouth ulcers. Patient denies cough. Patient denies SOB. Patient denies falls. Mild fatigue. Hot flashes; ore than 1 per day. Patient states she has a rapid HR occasionally; HR WNL. Visit Vitals    /87    Pulse 77    Temp 98.5 °F (36.9 °C)    Resp 16    Ht 5' 6\" (1.676 m)    Wt 239 lb 3.2 oz (108.5 kg)    SpO2 97%    BMI 38.61 kg/m2     Health Maintenance Review: Patient reminded of \"due or due soon\" health maintenance. I have asked the patient to contact his/her primary care provider (PCP) for follow-up on his/her health maintenance.

## 2018-09-06 ENCOUNTER — OFFICE VISIT (OUTPATIENT)
Dept: SURGERY | Age: 68
End: 2018-09-06

## 2018-09-06 VITALS
SYSTOLIC BLOOD PRESSURE: 147 MMHG | HEIGHT: 66 IN | WEIGHT: 239 LBS | BODY MASS INDEX: 38.41 KG/M2 | HEART RATE: 83 BPM | DIASTOLIC BLOOD PRESSURE: 91 MMHG

## 2018-09-06 DIAGNOSIS — Z98.890 S/P LUMPECTOMY, LEFT BREAST: ICD-10-CM

## 2018-09-06 DIAGNOSIS — Z90.11 H/O RIGHT MASTECTOMY: ICD-10-CM

## 2018-09-06 DIAGNOSIS — Z17.0 MALIGNANT NEOPLASM OF UPPER-OUTER QUADRANT OF LEFT BREAST IN FEMALE, ESTROGEN RECEPTOR POSITIVE (HCC): Primary | ICD-10-CM

## 2018-09-06 DIAGNOSIS — C50.412 MALIGNANT NEOPLASM OF UPPER-OUTER QUADRANT OF LEFT BREAST IN FEMALE, ESTROGEN RECEPTOR POSITIVE (HCC): Primary | ICD-10-CM

## 2018-09-06 NOTE — PROGRESS NOTES
HISTORY OF PRESENT ILLNESS  Seth Valdez is a 79 y.o. female. HPI  ESTABLISHED patient here for follow up LEFT breast cancer. She is doing well. A couple of weeks ago, she noticed some discomfort to her LEFT breast and nipple when she was washing. No pain today. Denies palpable lumps or skin changes. Continues to take Letrozole. History of breast cancer-  - RIGHT mastectomy.  No chemo or radiation. - LEFT breast 1 cm mucinous carcinoma. 0/4 nodes, %. ND 80%, Her 2 negative  2016 LEFT lumpectomy, SLNB  17- 2ml old blood aspirated by Dr. Daryn Flores  17- debridement  17- clot debridement with sutures placed  17- completed radiation. Followed by Dr. Luis Bansal. 17- started Letrozole. Followed by Dr. Carmina Lazo. OB History     Obstetric Comments    Menarche: 17. LMP:52  # of Children: 2. Age at Delivery of First Child:  24   Hysterectomy/oophorectomy:  Yes/one ovary removed. Breast Bx: yes   Hx of Breast Feeding:  no  BCP:  yes Hormone therapy: no.           Past Surgical History:   Procedure Laterality Date    BREAST SURGERY PROCEDURE UNLISTED      right mastectomy,lymph node biopsy    HX BREAST BIOPSY Left     stereotatic    HX BREAST LUMPECTOMY Left 2017    LEFT BREAST LUMPECTOMY AND LEFT BREAST SENTINEL NODE BIOPSY WITH BLUE DYE performed by Natalie Campbell MD at [facility]    hysterectomy/BSO    HX MASTECTOMY Right      Family history: Mother had breast cancer in her 66's, survived. Sister  from pancreatic cancer age 54    LEFT breast dx mammogram, 11/10/17, BIRADS 2  ROS    Physical Exam   Constitutional: She appears well-developed and well-nourished. Pulmonary/Chest: Left breast exhibits skin change (post XRT skin thickening) and tenderness (mildly tender around NAC). Left breast exhibits no inverted nipple, no mass and no nipple discharge. Musculoskeletal: Normal range of motion.    UE x 2 Lymphadenopathy:     She has no axillary adenopathy. Skin: Skin is warm, dry and intact. Chest and breasts examined   Psychiatric: She has a normal mood and affect. Her speech is normal and behavior is normal.     Visit Vitals    BP (!) 147/91    Pulse 83    Ht 5' 6\" (1.676 m)    Wt 239 lb (108.4 kg)    BMI 38.58 kg/m2     ASSESSMENT and PLAN  Encounter Diagnoses   Name Primary?  Malignant neoplasm of upper-outer quadrant of left breast in female, estrogen receptor positive ([facility] Yes    S/P lumpectomy, left breast     H/O right mastectomy      Normal exam with no evidence of local recurrence. Discussed normal post treatment changes to left breast.  No abnormal breast changes associated with the tenderness. LDmammogram in 11/2018. Has follow-up with Dr. Geoff Edwards at that time. RTC in 1/2019 or sooner PRN. She is comfortable with this plan. All questions answered and she stated understanding.

## 2018-09-06 NOTE — MR AVS SNAPSHOT
[address] [facility] 
[phone] Patient: Georgiana Hughes MRN: [id number] [facility]:2/96/1236 Visit Information Date & Time Provider Department Dept. Phone Encounter #  
 9/6/2018 10:00 AM Laxmi Castillo  [address] [id number] Your Appointments 11/26/2018 11:30 AM  
ESTABLISHED PATIENT with Court Gregorio MD  
[address] [facility] at [facility]) Appt Note: onc 6 mth f/u  
 [address] [facility]  
[phone]  
  
   
 [address] [place] [address]  
  
    
 1/24/2019  9:30 AM  
Follow Up with CARLINE [address] ([address]) Appt Note: 5month follow up/cp?/aac  
 [address] [place] [address]  
[address] [facility] Upcoming Health Maintenance Date Due DTaP/Tdap/Td series (1 - Tdap) 6/22/1973 FOBT Q 1 YEAR AGE 50-75 6/22/2002 GLAUCOMA SCREENING Q2Y 6/22/2017 MEDICARE YEARLY EXAM 3/14/2018 Influenza Age 5 to Adult 8/1/2018 BREAST CANCER SCRN MAMMOGRAM 11/10/2019 Pneumococcal 65+ High/Highest Risk (2 of 2 - PPSV23) 3/1/2022 Allergies as of 9/6/2018  Review Complete On: 9/6/2018 By: Laxmi Castillo NP Severity Noted Reaction Type Reactions Other Medication  01/10/2017    Hives  
 holter monitor pads Current Immunizations  Reviewed on 6/21/2017 Name Date Pneumococcal Vaccine (Unspecified Type) 3/1/2017 Varicella Virus Vaccine 3/1/2017 Not reviewed this visit You Were Diagnosed With   
  
 Codes Comments Malignant neoplasm of upper-outer quadrant of left breast in female, estrogen receptor positive ([facility] [address]    -  Primary ICD-10-CM: C50.412, Z17.0 ICD-9-CM: 174.4, V86.0 S/P lumpectomy, left breast     ICD-10-CM: U91.097 ICD-9-CM: V45.89   
 H/O right mastectomy     ICD-10-CM: Z90.11 ICD-9-CM: V45.71 Vitals BP Pulse Height(growth percentile) Weight(growth percentile) BMI OB Status (!) 147/91 83 5' 6\" (1.676 m) 239 lb (108.4 kg) 38.58 kg/m2 Hysterectomy Smoking Status Never Smoker BMI and BSA Data Body Mass Index Body Surface Area 38.58 kg/m 2 2.25 m 2 Preferred Pharmacy Pharmacy Name Phone [facility] [address] [place] [address], [address] [phone] Your Updated Medication List  
  
   
This list is accurate as of 9/6/18 11:26 AM.  Always use your most recent med list.  
  
  
  
  
 ASPIR-LOW 81 mg tablet Generic drug:  aspirin delayed-release Takes irregularly,usually weekly DAILY MULTI-VITAMINS/IRON tablet Generic drug:  multivitamin with iron Take 1 Tab by mouth daily. ergocalciferol 50,000 unit capsule Commonly known as:  ERGOCALCIFEROL Take 1 Cap by mouth every seven (7) days. * letrozole 2.5 mg tablet Commonly known as:  [facility] Take 1 Tab by mouth daily. * letrozole 2.5 mg tablet Commonly known as:  [facility] TAKE 1 TABLET BY MOUTH EVERY DAY  
  
 * letrozole 2.5 mg tablet Commonly known as:  [facility] TAKE 1 TABLET BY MOUTH EVERY DAY  
  
 raNITIdine 150 mg tablet Commonly known as:  ZANTAC Take 1 Tab by mouth two (2) times a day. simvastatin 20 mg tablet Commonly known as:  ZOCOR Take 1 Tab by mouth nightly. * Notice: This list has 3 medication(s) that are the same as other medications prescribed for you. Read the directions carefully, and ask your doctor or other care provider to review them with you. To-Do List   
 11/01/2018 Imaging:  YIMI MAMMO LT DX INCL CAD Patient Instructions Breast Cancer: Care Instructions Your Care Instructions Breast cancer occurs when abnormal cells grow out of control in the breast. These cancer cells can spread within the breast, to nearby lymph nodes and other tissues, and to other parts of the body. Being treated for cancer can weaken your body, and you may feel very tired. Get the rest your body needs so you can feel better. Finding out that you have cancer is scary. You may feel many emotions and may need some help coping. Seek out family, friends, and counselors for support. You also can do things at home to make yourself feel better while you go through treatment. Call the [employer] (6-143.465.7913) or visit its website at [facility] for more information. Follow-up care is a key part of your treatment and safety. Be sure to make and go to all appointments, and call your doctor if you are having problems. It's also a good idea to know your test results and keep a list of the medicines you take. How can you care for yourself at home? · Take your medicines exactly as prescribed. Call your doctor if you think you are having a problem with your medicine. You may get medicine for nausea and vomiting if you have these side effects. · Follow your doctor's instructions to relieve pain. Pain from cancer and surgery can almost always be controlled. Use pain medicine when you first notice pain, before it becomes severe. · Eat healthy food. If you do not feel like eating, try to eat food that has protein and extra calories to keep up your strength and prevent weight loss. Drink liquid meal replacements for extra calories and protein. Try to eat your main meal early. · Get some physical activity every day, but do not get too tired. Keep doing the hobbies you enjoy as your energy allows. · Do not smoke. Smoking can make your cancer worse. If you need help quitting, talk to your doctor about stop-smoking programs and medicines. These can increase your chances of quitting for good. · Take steps to control your stress and workload. Learn relaxation techniques. ¨ Share your feelings. Stress and tension affect our emotions. By expressing your feelings to others, you may be able to understand and cope with them. ¨ Consider joining a support group. Talking about a problem with your spouse, a good friend, or other people with similar problems is a good way to reduce tension and stress. ¨ Express yourself through art. Try writing, crafts, dance, or art to relieve stress. Some dance, writing, or art groups may be available just for people who have cancer. ¨ Be kind to your body and mind. Getting enough sleep, eating a healthy diet, and taking time to do things you enjoy can contribute to an overall feeling of balance in your life and can help reduce stress. ¨ Get help if you need it. Discuss your concerns with your doctor or counselor. · If you are vomiting or have diarrhea: ¨ Drink plenty of fluids (enough so that your urine is light yellow or clear like water) to prevent dehydration. Choose water and other caffeine-free clear liquids. If you have kidney, heart, or liver disease and have to limit fluids, talk with your doctor before you increase the amount of fluids you drink. ¨ When you are able to eat, try clear soups, mild foods, and liquids until all symptoms are gone for 12 to 48 hours. Other good choices include dry toast, crackers, cooked cereal, and gelatin dessert, such as Jell-O. · If you have not already done so, prepare a list of advance directives. Advance directives are instructions to your doctor and family members about what kind of care you want if you become unable to speak or express yourself. When should you call for help? Call 911 anytime you think you may need emergency care. For example, call if: 
  · You passed out (lost consciousness).  
 Call your doctor now or seek immediate medical care if:   · You have a fever.  
  · You have abnormal bleeding.  
  · You think you have an infection.  
  · You have new or worse pain.  
  · You have new symptoms, such as a cough, belly pain, vomiting, diarrhea, or a rash.  
 Watch closely for changes in your health, and be sure to contact your doctor if: 
  · You are much more tired than usual.  
  · You have swollen glands in your armpits, groin, or neck.  
  · You do not get better as expected. Where can you learn more? Go to http://janina-viviana.info/. Enter V321 in the search box to learn more about \"Breast Cancer: Care Instructions. \" Current as of: May 12, 2017 Content Version: 11.7 © 1420-8673 [employer]SU[employer]. Care instructions adapted under license by [employer] (which disclaims liability or warranty for this information). If you have questions about a medical condition or this instruction, always ask your healthcare professional. [place] [address] any warranty or liability for your use of this information. [address] [place] [address] Patient Instructions History Introducing [facility]! [address] [place] [address] introduces [employer]ISSUEL[employer] patient portal. Now you can access parts of your medical record, email your doctor's office, and request medication refills online. 1. In your internet browser, go to [email or web address] 2. Click on the First Time User? Click Here link in the Sign In box. You will see the New Member Sign Up page. 3. Enter your TISSUELAB Access Code exactly as it appears below. You will not need to use this code after youve completed the sign-up process. If you do not sign up before the expiration date, you must request a new code. · [employer] Access Code: [email or web address] Expires: 12/5/2018 10:49 AM 
 
4. Enter the last four digits of your Social Security Number (xxxx) and Date of Birth (mm/dd/yyyy) as indicated and click Submit. You will be taken to the next sign-up page. 5. Create a Koalah ID. This will be your Koalah login ID and cannot be changed, so think of one that is secure and easy to remember. 6. Create a Koalah password. You can change your password at any time. 7. Enter your Password Reset Question and Answer. This can be used at a later time if you forget your password. 8. Enter your e-mail address. You will receive e-mail notification when new information is available in [address] 9. Click Sign Up. You can now view and download portions of your medical record. 10. Click the Download Summary menu link to download a portable copy of your medical information. If you have questions, please visit the Frequently Asked Questions section of the Koalah website. Remember, Koalah is NOT to be used for urgent needs. For medical emergencies, dial 911. Now available from your iPhone and Android! Please provide this summary of care documentation to your next provider. Your primary care clinician is listed as Eliseo Vega. If you have any questions after today's visit, please call [phone].

## 2018-09-06 NOTE — PATIENT INSTRUCTIONS
Breast Cancer: Care Instructions  Your Care Instructions    Breast cancer occurs when abnormal cells grow out of control in the breast. These cancer cells can spread within the breast, to nearby lymph nodes and other tissues, and to other parts of the body. Being treated for cancer can weaken your body, and you may feel very tired. Get the rest your body needs so you can feel better. Finding out that you have cancer is scary. You may feel many emotions and may need some help coping. Seek out family, friends, and counselors for support. You also can do things at home to make yourself feel better while you go through treatment. Call the [employer] (4-578.458.9841) or visit its website at [employer] for more information. Follow-up care is a key part of your treatment and safety. Be sure to make and go to all appointments, and call your doctor if you are having problems. It's also a good idea to know your test results and keep a list of the medicines you take. How can you care for yourself at home? · Take your medicines exactly as prescribed. Call your doctor if you think you are having a problem with your medicine. You may get medicine for nausea and vomiting if you have these side effects. · Follow your doctor's instructions to relieve pain. Pain from cancer and surgery can almost always be controlled. Use pain medicine when you first notice pain, before it becomes severe. · Eat healthy food. If you do not feel like eating, try to eat food that has protein and extra calories to keep up your strength and prevent weight loss. Drink liquid meal replacements for extra calories and protein. Try to eat your main meal early. · Get some physical activity every day, but do not get too tired. Keep doing the hobbies you enjoy as your energy allows. · Do not smoke. Smoking can make your cancer worse. If you need help quitting, talk to your doctor about stop-smoking programs and medicines.  These can increase your chances of quitting for good. · Take steps to control your stress and workload. Learn relaxation techniques. ¨ Share your feelings. Stress and tension affect our emotions. By expressing your feelings to others, you may be able to understand and cope with them. ¨ Consider joining a support group. Talking about a problem with your spouse, a good friend, or other people with similar problems is a good way to reduce tension and stress. ¨ Express yourself through art. Try writing, crafts, dance, or art to relieve stress. Some dance, writing, or art groups may be available just for people who have cancer. ¨ Be kind to your body and mind. Getting enough sleep, eating a healthy diet, and taking time to do things you enjoy can contribute to an overall feeling of balance in your life and can help reduce stress. ¨ Get help if you need it. Discuss your concerns with your doctor or counselor. · If you are vomiting or have diarrhea:  ¨ Drink plenty of fluids (enough so that your urine is light yellow or clear like water) to prevent dehydration. Choose water and other caffeine-free clear liquids. If you have kidney, heart, or liver disease and have to limit fluids, talk with your doctor before you increase the amount of fluids you drink. ¨ When you are able to eat, try clear soups, mild foods, and liquids until all symptoms are gone for 12 to 48 hours. Other good choices include dry toast, crackers, cooked cereal, and gelatin dessert, such as Jell-O.  · If you have not already done so, prepare a list of advance directives. Advance directives are instructions to your doctor and family members about what kind of care you want if you become unable to speak or express yourself. When should you call for help? Call 911 anytime you think you may need emergency care.  For example, call if:    · You passed out (lost consciousness).    Call your doctor now or seek immediate medical care if:    · You have a fever.     · You have abnormal bleeding.     · You think you have an infection.     · You have new or worse pain.     · You have new symptoms, such as a cough, belly pain, vomiting, diarrhea, or a rash.    Watch closely for changes in your health, and be sure to contact your doctor if:    · You are much more tired than usual.     · You have swollen glands in your armpits, groin, or neck.     · You do not get better as expected. Where can you learn more? Go to http://janina-viviana.info/. Enter V321 in the search box to learn more about \"Breast Cancer: Care Instructions. \"  Current as of: May 12, 2017  Content Version: 11.7  © 3821-1114 [employer]. Care instructions adapted under license by [employer] (which disclaims liability or warranty for this information). If you have questions about a medical condition or this instruction, always ask your healthcare professional. [address] any warranty or liability for your use of this information.   80

## 2018-10-04 ENCOUNTER — HOSPITAL ENCOUNTER (OUTPATIENT)
Dept: MAMMOGRAPHY | Age: 68
Discharge: HOME | End: 2018-10-04
Attending: NURSE PRACTITIONER
Payer: MEDICARE

## 2018-10-04 DIAGNOSIS — C50.412 MALIGNANT NEOPLASM OF UPPER-OUTER QUADRANT OF LEFT BREAST IN FEMALE, ESTROGEN RECEPTOR POSITIVE (HCC): ICD-10-CM

## 2018-10-04 DIAGNOSIS — Z17.0 MALIGNANT NEOPLASM OF UPPER-OUTER QUADRANT OF LEFT BREAST IN FEMALE, ESTROGEN RECEPTOR POSITIVE (HCC): ICD-10-CM

## 2018-10-04 DIAGNOSIS — Z90.11 H/O RIGHT MASTECTOMY: ICD-10-CM

## 2018-10-04 DIAGNOSIS — Z98.890 S/P LUMPECTOMY, LEFT BREAST: ICD-10-CM

## 2018-10-04 PROCEDURE — 77065 DX MAMMO INCL CAD UNI: CPT

## 2018-12-03 ENCOUNTER — OFFICE VISIT (OUTPATIENT)
Dept: ONCOLOGY | Age: 68
End: 2018-12-03

## 2018-12-03 VITALS
DIASTOLIC BLOOD PRESSURE: 83 MMHG | RESPIRATION RATE: 16 BRPM | BODY MASS INDEX: 39.12 KG/M2 | HEIGHT: 66 IN | WEIGHT: 243.4 LBS | OXYGEN SATURATION: 96 % | TEMPERATURE: 98.4 F | HEART RATE: 79 BPM | SYSTOLIC BLOOD PRESSURE: 134 MMHG

## 2018-12-03 DIAGNOSIS — C50.412 MALIGNANT NEOPLASM OF UPPER-OUTER QUADRANT OF LEFT BREAST IN FEMALE, ESTROGEN RECEPTOR POSITIVE (HCC): Primary | ICD-10-CM

## 2018-12-03 DIAGNOSIS — Z17.0 MALIGNANT NEOPLASM OF UPPER-OUTER QUADRANT OF LEFT BREAST IN FEMALE, ESTROGEN RECEPTOR POSITIVE (HCC): Primary | ICD-10-CM

## 2018-12-03 DIAGNOSIS — Z78.0 POST-MENOPAUSAL: ICD-10-CM

## 2018-12-03 DIAGNOSIS — E55.9 VITAMIN D DEFICIENCY: ICD-10-CM

## 2018-12-03 NOTE — PROGRESS NOTES
Julien Hannon is a 74 y.o. female here today for left sided breast cancer f/u. S/p lumpectomy. Patient on  Letrozole; started 5/1/17. Prolia on hold d/t dental work; pt reports she does not know how long it will be before she gets the dental work done d/t her insurance. VS stable. Patient denies pain. Good appetite. Patient denies N/V/D and constipation. Patient reports numbness and tingling in her fingers and hands. Patient denies mouth ulcers. Patient denies cough. Patient denies SOB. Patient denies falls. Visit Vitals /83 (BP 1 Location: Left arm, BP Patient Position: Sitting) Pulse 79 Temp 98.4 °F (36.9 °C) (Oral) Resp 16 Ht 5' 6\" (1.676 m) Wt 243 lb 6.4 oz (110.4 kg) SpO2 96% BMI 39.29 kg/m² 1. Have you been to the ER, urgent care clinic since your last visit? No.  Hospitalized since your last visit? No 
 
2. Have you seen or consulted any other health care providers outside of the [address] [place] [address] since your last visit? Include any pap smears or colon screening. No 
  
 
Depression Screening completed today Fall Risk Completed Health Maintenance Review: Patient reminded of \"due or due soon\" health maintenance. I have asked the patient to contact his/her primary care provider (PCP) for follow-up on his/her health maintenance.

## 2018-12-03 NOTE — PROGRESS NOTES
Follow-up Note Patient: Marisa Carrasco MRN: [id number]  SSN: [id number] YOB: 1950  Age: 79 y.o. Sex: female Diagnosis: 1. Left breast carcinoma: 
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  
 
1. On Letrozole - started 5/1/2017 2. Completed radiation therapy on 4/28/2017 3. Lumpectomy on 1/18/2017 Subjective:  
  
Marisa Carrasco is a 79 y.o. female with a diagnosis of left sided breast carcinoma. She underwent a screening mammogram and was noted to have an abnormality in the left breast. Biopsy showed mucinous carcinoma which is ER and KS +ve. She was seen by Dr. Aisha Aj and then underwent left breast lumpectomy on 01/18/2017. She finished radiation therapy on 4/28/2017. She started hormonal therapy with Letrozole and is tolerating it well without complaints. Yany was held d/t dental work. Review of Systems: 
 
Constitutional: negative Eyes: negative Ears, Nose, Mouth, Throat, and Face: negative Respiratory: negative Cardiovascular: negative Gastrointestinal: negative Genitourinary:negative Integument/Breast: negative Hematologic/Lymphatic: negative Musculoskeletal:negative Neurological: negative Past Medical History:  
Diagnosis Date  Adenocarcinoma of breast ([address] 2017 LEFT  Breast cancer ([address]   
 1991 RIGHT  Cancer [facility]) [id number]   
 breast cancer right,left breast  
 GERD (gastroesophageal reflux disease)  Hypercholesterolemia  Radiation therapy complication 3511  
 left breast  
 Sleep apnea   
 possible, to have sleep study 1/26/2017 Past Surgical History:  
Procedure Laterality Date [address]  
 right mastectomy,lymph node biopsy  HX BREAST BIOPSY Left 2016  
 stereotatic  HX BREAST LUMPECTOMY Left 1/18/2017  LEFT BREAST LUMPECTOMY AND LEFT BREAST SENTINEL NODE BIOPSY WITH BLUE DYE performed by Mark Harkins MD at [facility]  
 [address]  
 hysterectomy/BSO [facility] Family History Problem Relation Age of Onset  Breast Cancer Mother  Dementia Mother  Diabetes Mother  Heart Disease Father  Stroke Father  Cancer Sister   
     pancreatic  Cancer Brother   
     lung  Diabetes Sister  Hypertension Sister Social History Tobacco Use  Smoking status: Never Smoker  Smokeless tobacco: Never Used Substance Use Topics  Alcohol use: No  
  
Prior to Admission medications Medication Sig Start Date End Date Taking? Authorizing Provider  
calcium carbonate/vitamin D3 (CALCIUM + D PO) Take  by mouth. Yes Provider, Historical  
letrozole (FEMARA) 2.5 mg tablet TAKE 1 TABLET BY MOUTH EVERY DAY 5/2/18  Yes Tyrone Peña MD  
raNITIdine (ZANTAC) 150 mg tablet Take 1 Tab by mouth two (2) times a day. 3/22/18  Yes Tawnya Daniel MD  
simvastatin (ZOCOR) 20 mg tablet Take 1 Tab by mouth nightly. 6/8/17  Yes Tawnya Daniel MD  
ASPIR-LOW 81 mg tablet Takes irregularly,usually weekly 11/1/16  Yes Provider, Historical  
multivitamin with iron (DAILY MULTI-VITAMINS/IRON) tablet Take 1 Tab by mouth daily. Yes Provider, Historical  
ergocalciferol (ERGOCALCIFEROL) 50,000 unit capsule Take 1 Cap by mouth every seven (7) days. 5/23/18   Silva Greenberg NP Allergies Allergen Reactions  Other Medication Hives  
  holter monitor pads Objective:  
 
Vitals:  
 12/03/18 0933 BP: 134/83 Pulse: 79 Resp: 16 Temp: 98.4 °F (36.9 °C) TempSrc: Oral  
SpO2: 96% Weight: 243 lb 6.4 oz (110.4 kg) Height: 5' 6\" (1.676 m) Physical Exam: 
 
GENERAL: alert, cooperative EYE: negative LYMPHATIC: Cervical, supraclavicular, and axillary nodes normal.  
THROAT & NECK: normal and no erythema or exudates noted. LUNG: clear to auscultation bilaterally HEART: regular rate and rhythm ABDOMEN: soft, non-tender EXTREMITIES: no cyanosis or edema SKIN: Normal. 
NEUROLOGIC: negative Assessment: 1. Left breast carcinoma: 
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 
Symptom management form reviewed with patient. 2. Osteopenia DEXA (2/12/2017): osteopenic Prolia on hold for dental work 3. Vitamin D deficiency 
 
> Continue Vitamin D 50,000 units weekly 
> Recheck Vitamin D today Plan:  
 
 
> Continue Letrozole 
> Recheck Vitamin D 
> Repeat DEXA scan in February 2019 
> Follow-up appointment in 6 months Signed by: Jenelle Bruno MD 
                   December 3, 2018 
 
 
 
 
 
Eve Herrera MD 
CC.  Giuliano Butler MD

## 2019-01-24 ENCOUNTER — OFFICE VISIT (OUTPATIENT)
Dept: SURGERY | Age: 69
End: 2019-01-24

## 2019-01-24 VITALS
WEIGHT: 243 LBS | SYSTOLIC BLOOD PRESSURE: 124 MMHG | BODY MASS INDEX: 39.05 KG/M2 | HEIGHT: 66 IN | HEART RATE: 87 BPM | DIASTOLIC BLOOD PRESSURE: 81 MMHG

## 2019-01-24 DIAGNOSIS — Z17.0 MALIGNANT NEOPLASM OF UPPER-OUTER QUADRANT OF LEFT BREAST IN FEMALE, ESTROGEN RECEPTOR POSITIVE (HCC): Primary | ICD-10-CM

## 2019-01-24 DIAGNOSIS — Z90.11 H/O RIGHT MASTECTOMY: ICD-10-CM

## 2019-01-24 DIAGNOSIS — C50.412 MALIGNANT NEOPLASM OF UPPER-OUTER QUADRANT OF LEFT BREAST IN FEMALE, ESTROGEN RECEPTOR POSITIVE (HCC): Primary | ICD-10-CM

## 2019-01-24 DIAGNOSIS — Z98.890 S/P LUMPECTOMY, LEFT BREAST: ICD-10-CM

## 2019-01-24 NOTE — PROGRESS NOTES
HISTORY OF PRESENT ILLNESS  Sandro Paredes is a 79 y.o. female. HPI ESTABLISHED patient here for follow up LEFT breast cancer. No breast problems to report. Currently taking Letrozole and tolerating it well. History of breast cancer-  - RIGHT mastectomy.  No chemo or radiation. - LEFT breast 1 cm mucinous carcinoma. 0/4 nodes, %. MI 80%, Her 2 negative  2016 LEFT lumpectomy, [facility] - Dr. Bhavik Tripp  17- 2ml old blood aspirated by Dr. Bhavik Tripp  17- debridement  17- clot debridement with sutures placed  17- completed radiation. Followed by Dr. Breanna Irene. 17- started Letrozole. Followed by Dr. Melvi Fields. OB History     Obstetric Comments    Menarche: 17. LMP:52  # of Children: 2. Age at Delivery of First Child:  24   Hysterectomy/oophorectomy:  Yes/one ovary removed. Breast Bx: yes   Hx of Breast Feeding:  no  BCP:  yes Hormone therapy: no.           Past Surgical History:   Procedure Laterality Date    BREAST SURGERY PROCEDURE UNLISTED      right mastectomy,lymph node biopsy    HX BREAST BIOPSY Left     stereotatic    HX BREAST LUMPECTOMY Left 2017    LEFT BREAST LUMPECTOMY AND LEFT BREAST SENTINEL NODE BIOPSY WITH BLUE DYE performed by Seth Carcamo MD at [address]     hysterectomy/BSO    HX MASTECTOMY Right       Family history-  Mother had breast cancer in her 66's. Sister  from pancreatic cancer age 54.     Breast imaging-  LEFT breast diagnostic mammogram 10/4/18: BI-RADS 1.    ROS    Physical Exam   Constitutional: She appears well-developed and well-nourished. Pulmonary/Chest: Left breast exhibits no inverted nipple, no mass, no nipple discharge, no skin change and no tenderness. Musculoskeletal: Normal range of motion. UE x 2   Lymphadenopathy:     She has no axillary adenopathy. Right: No supraclavicular adenopathy present. Left: No supraclavicular adenopathy present.    Skin: Skin is warm, dry and intact. Chest and breast examined   Psychiatric: She has a normal mood and affect. Her speech is normal and behavior is normal.     Visit Vitals  /81   Pulse 87   Ht 5' 6\" (1.676 m)   Wt 243 lb (110.2 kg)   BMI 39.22 kg/m²     ASSESSMENT and PLAN  Encounter Diagnoses   Name Primary?  Malignant neoplasm of upper-outer quadrant of left breast in female, estrogen receptor positive ([facility] [address] Yes    S/P lumpectomy, left breast     H/O right mastectomy      Normal exam with no evidence of local recurrence. Continues on letrozole and has follow-up with Dr. Ancil Schaumann. RX given for bras along with knitted knockers. LDmammogram due in 10/2019 and she will RTC here at that time as well. She is comfortable with this plan. All questions answered and she stated understanding.

## 2019-01-24 NOTE — PATIENT INSTRUCTIONS
Learning About Breast Cancer Screening  What is breast cancer screening? Breast cancer occurs when cells that are not normal grow in one or both of your breasts. Screening tests can help find breast cancer early. Cancer is easier to treat when it's found early. Having concerns about breast cancer is common. That's why it's important to talk with your doctor about when to start and how often to get screened for breast cancer. How is breast cancer screening done? Several screening tests can be used to check for breast cancer. · Mammograms check for signs of cancer using X-rays. They can show tumors that are too small for you or your doctor to feel. During a mammogram, a machine squeezes your breasts to make them flatter and easier to X-ray. At least two pictures are taken of each breast. One is taken from the top and one from the side. · 3-D mammograms are also called digital breast tomosynthesis. Your breast is positioned on a flat plate. A top plate is pressed against your breast to keep it in position. The X-ray arm then moves in an arc above the breast and takes many pictures. A computer uses these X-rays to create a three-dimensional image. · Clinical breast exams are a doctor's exam. Your doctor carefully feels your breasts and under your arms to check for lumps or other changes. After the screening, your doctor will tell you the results. You will also be told if you need any follow-up tests. When should you get screened? Talk with your doctor about when you should start being tested for breast cancer. How often you get tested and the kind of tests you get will depend on your age and your risk. The guidelines that follow are for women who have an average risk for breast cancer. If you have a higher risk for breast cancer, such as having a family history of breast cancer in multiple relatives or at a young age, your doctor may recommend different screening for you.   · Ages 21 to 44: Some experts recommend that women have a clinical breast exam every 3 years, starting at age 21. Ask your doctor how often you should have this test. If you have a high risk for breast cancer, talk with your doctor about when to start yearly mammograms and other screening tests. · Ages 36 and older: Talk with your doctor about how often you should have mammograms and clinical breast exams. What is your risk for breast cancer? If you don't already know your risk of breast cancer, you can ask your doctor about it. You can also look it up at www.cancer.gov/bcrisktool/. If your doctor says that you have a high or very high risk, ask about ways to reduce your risk. These could include getting extra screening, taking medicine, or having surgery. If you have a strong family history of breast cancer, ask your doctor about genetic testing. What steps can you take to stay healthy? Some things that increase your risk of breast cancer, such as your age and being female, cannot be controlled. But you can do some things to stay as healthy as you can. · Learn what your breasts normally look and feel like. If you notice any changes, tell your doctor. · Drink alcohol wisely. Your risk goes up the more you drink. For the best health, women should have no more than 1 drink a day or 7 drinks a week. · If you smoke, quit. When you quit smoking, you lower your chances of getting many types of cancer. You can also do your best to eat well, be active, and stay at a healthy weight. Eating healthy foods and being active every day, as well as staying at a healthy weight, may help prevent cancer. Where can you learn more? Go to http://janina-viviana.info/. Enter H382 in the search box to learn more about \"Learning About Breast Cancer Screening. \"  Current as of: March 27, 2018  Content Version: 11.9  © 2632-6218 [employer].  Care instructions adapted under license by [employer] (which disclaims liability or warranty for this information). If you have questions about a medical condition or this instruction, always ask your healthcare professional. [place] [address] any warranty or liability for your use of this information.

## 2019-02-19 ENCOUNTER — HOSPITAL ENCOUNTER (OUTPATIENT)
Dept: BONE DENSITY | Age: 69
Discharge: HOME | End: 2019-02-19
Attending: INTERNAL MEDICINE
Payer: MEDICARE

## 2019-02-19 DIAGNOSIS — Z17.0 MALIGNANT NEOPLASM OF UPPER-OUTER QUADRANT OF LEFT BREAST IN FEMALE, ESTROGEN RECEPTOR POSITIVE (HCC): ICD-10-CM

## 2019-02-19 DIAGNOSIS — Z78.0 POST-MENOPAUSAL: ICD-10-CM

## 2019-02-19 DIAGNOSIS — C50.412 MALIGNANT NEOPLASM OF UPPER-OUTER QUADRANT OF LEFT BREAST IN FEMALE, ESTROGEN RECEPTOR POSITIVE (HCC): ICD-10-CM

## 2019-02-19 DIAGNOSIS — E55.9 VITAMIN D DEFICIENCY: ICD-10-CM

## 2019-02-19 PROCEDURE — 77080 DXA BONE DENSITY AXIAL: CPT

## 2019-04-08 RX ORDER — LETROZOLE 2.5 MG/1
TABLET, FILM COATED ORAL
Qty: 90 TAB | Refills: 0 | Status: SHIPPED | OUTPATIENT
Start: 2019-04-08 | End: 2019-10-08 | Stop reason: SDUPTHER

## 2019-06-12 ENCOUNTER — OFFICE VISIT (OUTPATIENT)
Dept: ONCOLOGY | Age: 69
End: 2019-06-12

## 2019-06-12 VITALS
HEART RATE: 79 BPM | RESPIRATION RATE: 16 BRPM | HEIGHT: 66 IN | WEIGHT: 238 LBS | SYSTOLIC BLOOD PRESSURE: 150 MMHG | TEMPERATURE: 97.7 F | DIASTOLIC BLOOD PRESSURE: 81 MMHG | OXYGEN SATURATION: 97 % | BODY MASS INDEX: 38.25 KG/M2

## 2019-06-12 DIAGNOSIS — M85.80 OSTEOPENIA, UNSPECIFIED LOCATION: ICD-10-CM

## 2019-06-12 DIAGNOSIS — E55.9 VITAMIN D DEFICIENCY: ICD-10-CM

## 2019-06-12 DIAGNOSIS — C50.412 MALIGNANT NEOPLASM OF UPPER-OUTER QUADRANT OF LEFT BREAST IN FEMALE, ESTROGEN RECEPTOR POSITIVE (HCC): Primary | ICD-10-CM

## 2019-06-12 DIAGNOSIS — Z17.0 MALIGNANT NEOPLASM OF UPPER-OUTER QUADRANT OF LEFT BREAST IN FEMALE, ESTROGEN RECEPTOR POSITIVE (HCC): Primary | ICD-10-CM

## 2019-06-12 NOTE — PROGRESS NOTES
Chief Complaint   Patient presents with    Breast Cancer     left       1. Have you been to the ER, urgent care clinic since your last visit? Hospitalized since your last visit? no    2. Have you seen or consulted any other health care providers outside of the [address] [place] [address] since your last visit? Include any pap smears or colon screening.   no

## 2019-06-13 NOTE — PROGRESS NOTES
Follow-up Note        Patient: Araceli Bowers MRN: [id number]  SSN: [id number]    YOB: 1950  Age: 79 y.o. Sex: female        Diagnosis:     1. 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 On Letrozole - started 5/1/2017  2. Completed radiation therapy on 4/28/2017  3. Lumpectomy on 1/18/2017    Subjective:      Araceli Bowers is a 79 y.o. female with a diagnosis of left sided breast carcinoma. She underwent a screening mammogram and was noted to have an abnormality in the left breast. Biopsy showed mucinous carcinoma which is ER and [place] +ve. She was seen by Dr. Angel Roy and then underwent left breast lumpectomy on 01/18/2017. She finished radiation therapy on 4/28/2017. She started hormonal therapy with Letrozole and is tolerating it well without complaints. Lucky Pai was held d/t dental work.        Review of Systems:    Constitutional: negative  Eyes: negative  Ears, Nose, Mouth, Throat, and Face: negative  Respiratory: negative  Cardiovascular: negative  Gastrointestinal: negative  Genitourinary:negative  Integument/Breast: negative  Hematologic/Lymphatic: negative  Musculoskeletal:negative  Neurological: negative        Past Medical History:   Diagnosis Date    [facility]     2017 LEFT     Breast cancer [facility]     1991 RIGHT    Cancer [facility]) [id number]     breast cancer right,left breast    GERD (gastroesophageal reflux disease)     Hypercholesterolemia     Radiation therapy complication 0320    left breast    Sleep apnea     possible, to have sleep study 1/26/2017     Past Surgical History:   Procedure Laterality Date    BREAST SURGERY PROCEDURE UNLISTED  1991    right mastectomy,lymph node biopsy    HX BREAST BIOPSY Left 2016    stereotatic    HX BREAST LUMPECTOMY Left 1/18/2017    LEFT BREAST LUMPECTOMY AND LEFT BREAST SENTINEL NODE BIOPSY WITH BLUE DYE performed by Damian Ponce MD at [address] [place] [address] HX GYN  1999    hysterectomy/BSO    HX MASTECTOMY Right 1991      Family History   Problem Relation Age of Onset    Breast Cancer Mother     Dementia Mother     Diabetes Mother     Heart Disease Father     Stroke Father     Cancer Sister         pancreatic    Cancer Brother         lung    Diabetes Sister     Hypertension Sister      Social History     Tobacco Use    Smoking status: Never Smoker    Smokeless tobacco: Never Used   Substance Use Topics    Alcohol use: No      Prior to Admission medications    Medication Sig Start Date End Date Taking? Authorizing Provider   letrozole [facility]) 2.5 mg tablet TAKE 1 TABLET BY MOUTH DAILY 4/8/19  Yes Amirah Mcgovern MD   calcium carbonate/vitamin D3 (CALCIUM + D PO) Take  by mouth. Yes Provider, Historical   ergocalciferol (ERGOCALCIFEROL) 50,000 unit capsule Take 1 Cap by mouth every seven (7) days. 5/23/18  Yes Sandra Billings NP   letrozole (FEMARA) 2.5 mg tablet TAKE 1 TABLET BY MOUTH EVERY DAY 5/2/18  Yes Amirah Mcgovern MD   raNITIdine (ZANTAC) 150 mg tablet Take 1 Tab by mouth two (2) times a day. 3/22/18  Yes Nidhi Aguirre MD   simvastatin (ZOCOR) 20 mg tablet Take 1 Tab by mouth nightly. 6/8/17  Yes Nidhi Aguirre MD   ASPIR-LOW 81 mg tablet Takes irregularly,usually weekly 11/1/16  Yes Provider, Historical   multivitamin with iron (DAILY MULTI-VITAMINS/IRON) tablet Take 1 Tab by mouth daily. Yes Provider, Historical              Allergies   Allergen Reactions    Other Medication Hives     holter monitor pads           Objective:     Vitals:    06/12/19 1030   BP: 150/81   Pulse: 79   Resp: 16   Temp: 97.7 °F (36.5 °C)   TempSrc: Oral   SpO2: 97%   Weight: 238 lb (108 kg)   Height: 5' 6\" (1.676 m)          Physical Exam:    GENERAL: alert, cooperative  EYE: negative  LYMPHATIC: Cervical, supraclavicular, and axillary nodes normal.   THROAT & NECK: normal and no erythema or exudates noted.    LUNG: clear to auscultation bilaterally  HEART: regular rate and rhythm  ABDOMEN: soft, non-tender  EXTREMITIES: no cyanosis or edema  SKIN: Normal.  NEUROLOGIC: negative        Assessment:     1. Left breast carcinoma:  pT1b N0 M0 (Stage IA) mucinous carcinoma, Tumor size 1 cm, LN -ve, grade 2, %, [place] 80%, Her 2 -ve  Dx: 12/9/2016    ECOG PS 0  Intent of treatment - curative    S/P left sided lumpectomy 01/18/2017  Completed radiation therapy on 4/28/2017    On Letrozole - started 5/1/2017  No side effects  Tolerating well     In remission    Mammogram (10/4/2018): normal  Symptom management form reviewed with patient. 2. Osteopenia    DEXA (2/12/2017): osteopenic  Prolia on hold for dental work      3. Vitamin D deficiency    > Continue Vitamin D 50,000 units weekly  > Recheck Vitamin D      Plan:       > Continue Letrozole  > Follow-up appointment in 6 months        Signed by: Jaz Lockwood MD                     June 12, 2019          CC. Raymond Swann MD  CC.  Ilsa Hauser MD

## 2019-07-10 DIAGNOSIS — Z17.0 MALIGNANT NEOPLASM OF UPPER-OUTER QUADRANT OF LEFT BREAST IN FEMALE, ESTROGEN RECEPTOR POSITIVE (HCC): Primary | ICD-10-CM

## 2019-07-10 DIAGNOSIS — C50.412 MALIGNANT NEOPLASM OF UPPER-OUTER QUADRANT OF LEFT BREAST IN FEMALE, ESTROGEN RECEPTOR POSITIVE (HCC): Primary | ICD-10-CM

## 2019-07-10 RX ORDER — LETROZOLE 2.5 MG/1
2.5 TABLET, FILM COATED ORAL DAILY
Qty: 90 TAB | Refills: 0 | Status: SHIPPED | OUTPATIENT
Start: 2019-07-10 | End: 2019-09-25 | Stop reason: SDUPTHER

## 2019-07-10 NOTE — TELEPHONE ENCOUNTER
PER VORB from Dr. Bill Chavez LETROZOLE 2.5MG [facility] [place]) ONE TAB ONCE A DAY QUANTITY 90 REFILL 0.

## 2019-09-05 ENCOUNTER — TELEPHONE (OUTPATIENT)
Dept: INTERNAL MEDICINE CLINIC | Age: 69
End: 2019-09-05

## 2019-09-05 NOTE — TELEPHONE ENCOUNTER
#[phone] has right side pain that needs attention as it has been there for two days now. Pt is coming in for CPE on 9-25-19 and asking if there is anything available sooner.  Please call pt

## 2019-09-06 ENCOUNTER — OFFICE VISIT (OUTPATIENT)
Dept: INTERNAL MEDICINE CLINIC | Age: 69
End: 2019-09-06

## 2019-09-06 VITALS
TEMPERATURE: 97.4 F | BODY MASS INDEX: 37.93 KG/M2 | RESPIRATION RATE: 18 BRPM | HEIGHT: 66 IN | SYSTOLIC BLOOD PRESSURE: 141 MMHG | WEIGHT: 236 LBS | HEART RATE: 86 BPM | DIASTOLIC BLOOD PRESSURE: 87 MMHG | OXYGEN SATURATION: 97 %

## 2019-09-06 DIAGNOSIS — R10.9 RIGHT FLANK PAIN: Primary | ICD-10-CM

## 2019-09-06 LAB
BILIRUB UR QL STRIP: NEGATIVE
GLUCOSE UR-MCNC: NEGATIVE MG/DL
KETONES P FAST UR STRIP-MCNC: NEGATIVE MG/DL
PH UR STRIP: 7 [PH] (ref 4.6–8)
PROT UR QL STRIP: NEGATIVE
SP GR UR STRIP: 1.01 (ref 1–1.03)
UA UROBILINOGEN AMB POC: NORMAL (ref 0.2–1)
URINALYSIS CLARITY POC: CLEAR
URINALYSIS COLOR POC: YELLOW
URINE BLOOD POC: NEGATIVE
URINE LEUKOCYTES POC: NEGATIVE
URINE NITRITES POC: NEGATIVE

## 2019-09-06 RX ORDER — IBUPROFEN 600 MG/1
600 TABLET ORAL
Qty: 30 TAB | Refills: 1 | Status: SHIPPED | OUTPATIENT
Start: 2019-09-06 | End: 2019-09-06 | Stop reason: SDUPTHER

## 2019-09-06 RX ORDER — CYCLOBENZAPRINE HCL 10 MG
10 TABLET ORAL
Qty: 30 TAB | Refills: 1 | Status: SHIPPED | OUTPATIENT
Start: 2019-09-06 | End: 2019-12-23

## 2019-09-06 RX ORDER — CYCLOBENZAPRINE HCL 10 MG
10 TABLET ORAL
Qty: 30 TAB | Refills: 1 | Status: SHIPPED | OUTPATIENT
Start: 2019-09-06 | End: 2019-09-06 | Stop reason: SDUPTHER

## 2019-09-06 RX ORDER — IBUPROFEN 600 MG/1
600 TABLET ORAL
Qty: 30 TAB | Refills: 1 | Status: SHIPPED | OUTPATIENT
Start: 2019-09-06 | End: 2019-09-25

## 2019-09-06 NOTE — PROGRESS NOTES
SUBJECTIVE  Ms. Severo Govern presents today acutely for     Chief Complaint   Patient presents with    Back Pain     pt here today c.o R side back pain; pt described pain as dull; pt has had pain for over 1 week; pt took ibuprofen     She has had pain for about a week. She doesn't recall any injury or trauma, other than a \"very minor accident 3 weeks ago. \"     Today she took ibuprofen, and it improved. No Rash. No Urinary symptoms. OBJECTIVE  Visit Vitals  /87 (BP 1 Location: Left arm, BP Patient Position: Sitting)   Pulse 86   Temp 97.4 °F (36.3 °C) (Oral)   Resp 18   Ht 5' 6\" (1.676 m)   Wt 236 lb (107 kg)   SpO2 97%   BMI 38.09 kg/m²     Gen: Pleasant 81 y.o.  female in NAD.    HEART: RRR, No M/G/R.   LUNGS: CTAB No W/R.   ABDOMEN: S, NT, ND, BS+.   EXTREMITIES: Warm. No C/C/E. MUSCULOSKELETAL: She has some pain with deep palpation in R costovertebral area. SKIN: Warm. Dry. No rashes or other lesions noted. UA: Normal UA.     ASSESSMENT / PLAN    ICD-10-CM ICD-9-CM    1. Right flank pain--likely muscular vs. Costochondritis. R10.9 789.09 AMB POC URINALYSIS DIP STICK AUTO W/O MICRO      ibuprofen (MOTRIN) 600 mg tablet      cyclobenzaprine (FLEXERIL) 10 mg tablet     Follow-up and Dispositions    · Return if symptoms worsen or fail to improve. I have reviewed with the patient details of the assessment and plan and all questions were answered. Relevant patient education was performed.

## 2019-09-06 NOTE — TELEPHONE ENCOUNTER
Spoke with patient. Two pt identifiers confirmed. Patient advised that I can schedule her for an acute appointment, but would not be able to move her appointment for her CPE. Patient verbalized understanding. Patient offered an appointment with Dr. Dali Blandon today, 09/06/19. Appointment accepted. Patient advised if anything changes or if unable to keep this appointment to call the office  Pt verbalized understanding of information discussed w/ no further questions at this time.

## 2019-09-06 NOTE — PATIENT INSTRUCTIONS
Office Policies    Phone calls/patient messages:            Please allow up to 24 hours for someone in the office to contact you about your call or message. Be mindful your provider may be out of the office or your message may require further review. We encourage you to use [employer] for your messages as this is a faster, more efficient way to communicate with our office                         Medication Refills:            Prescription medications require 48-72 business hours to process. We encourage you to use [employer] for your refills. For controlled medications: Please allow 72 business hours to process. Certain medications may require you to  a written prescription at our office. NO narcotic/controlled medications will be prescribed after 4pm Monday through Friday or on weekends              Form/Paperwork Completion:            Please note a $25 fee may incur for all paperwork for completed by our providers. We ask that you allow 7-10 business days. Pre-payment is due prior to picking up/faxing the completed form. You may also download your forms to [employer] to have your doctor print off. Back Pain: Care Instructions  Your Care Instructions    Back pain has many possible causes. It is often related to problems with muscles and ligaments of the back. It may also be related to problems with the nerves, discs, or bones of the back. Moving, lifting, standing, sitting, or sleeping in an awkward way can strain the back. Sometimes you don't notice the injury until later. Arthritis is another common cause of back pain. Although it may hurt a lot, back pain usually improves on its own within several weeks. Most people recover in 12 weeks or less. Using good home treatment and being careful not to stress your back can help you feel better sooner. Follow-up care is a key part of your treatment and safety.  Be sure to make and go to all appointments, and call your doctor if you are having problems. It's also a good idea to know your test results and keep a list of the medicines you take. How can you care for yourself at home? · Sit or lie in positions that are most comfortable and reduce your pain. Try one of these positions when you lie down:  ? Lie on your back with your knees bent and supported by large pillows. ? Lie on the floor with your legs on the seat of a sofa or chair. ? Lie on your side with your knees and hips bent and a pillow between your legs. ? Lie on your stomach if it does not make pain worse. · Do not sit up in bed, and avoid soft couches and twisted positions. Bed rest can help relieve pain at first, but it delays healing. Avoid bed rest after the first day of back pain. · Change positions every 30 minutes. If you must sit for long periods of time, take breaks from sitting. Get up and walk around, or lie in a comfortable position. · Try using a heating pad on a low or medium setting for 15 to 20 minutes every 2 or 3 hours. Try a warm shower in place of one session with the heating pad. · You can also try an ice pack for 10 to 15 minutes every 2 to 3 hours. Put a thin cloth between the ice pack and your skin. · Take pain medicines exactly as directed. ? If the doctor gave you a prescription medicine for pain, take it as prescribed. ? If you are not taking a prescription pain medicine, ask your doctor if you can take an over-the-counter medicine. · Take short walks several times a day. You can start with 5 to 10 minutes, 3 or 4 times a day, and work up to longer walks. Walk on level surfaces and avoid hills and stairs until your back is better. · Return to work and other activities as soon as you can. Continued rest without activity is usually not good for your back. · To prevent future back pain, do exercises to stretch and strengthen your back and stomach. Learn how to use good posture, safe lifting techniques, and proper body mechanics.   When should you call for help?  Call your doctor now or seek immediate medical care if:    · You have new or worsening numbness in your legs.     · You have new or worsening weakness in your legs. (This could make it hard to stand up.)     · You lose control of your bladder or bowels.    Watch closely for changes in your health, and be sure to contact your doctor if:    · You have a fever, lose weight, or don't feel well.     · You do not get better as expected. Where can you learn more? Go to http://janina-viviana.info/. Enter G135 in the search box to learn more about \"Back Pain: Care Instructions. \"  Current as of: September 20, 2018  Content Version: 12.1  © 3509-5170 [employer]. Care instructions adapted under license by [employer] (which disclaims liability or warranty for this information). If you have questions about a medical condition or this instruction, always ask your healthcare professional. [address] any warranty or liability for your use of this information.

## 2019-09-06 NOTE — PROGRESS NOTES
1. Have you been to the ER, urgent care clinic since your last visit? Hospitalized since your last visit?urgent care    2. Have you seen or consulted any other health care providers outside of the [address] [place] [address] since your last visit? Include any pap smears or colon screening.  no

## 2019-09-25 ENCOUNTER — OFFICE VISIT (OUTPATIENT)
Dept: INTERNAL MEDICINE CLINIC | Age: 69
End: 2019-09-25

## 2019-09-25 VITALS
TEMPERATURE: 97.9 F | BODY MASS INDEX: 37.67 KG/M2 | OXYGEN SATURATION: 99 % | RESPIRATION RATE: 16 BRPM | SYSTOLIC BLOOD PRESSURE: 131 MMHG | HEART RATE: 73 BPM | HEIGHT: 66 IN | WEIGHT: 234.4 LBS | DIASTOLIC BLOOD PRESSURE: 87 MMHG

## 2019-09-25 DIAGNOSIS — C50.412 MALIGNANT NEOPLASM OF UPPER-OUTER QUADRANT OF LEFT BREAST IN FEMALE, ESTROGEN RECEPTOR POSITIVE (HCC): ICD-10-CM

## 2019-09-25 DIAGNOSIS — R73.09 ELEVATED GLUCOSE: ICD-10-CM

## 2019-09-25 DIAGNOSIS — E55.9 VITAMIN D DEFICIENCY: ICD-10-CM

## 2019-09-25 DIAGNOSIS — Z12.11 SCREENING FOR COLON CANCER: ICD-10-CM

## 2019-09-25 DIAGNOSIS — K21.9 GASTROESOPHAGEAL REFLUX DISEASE WITHOUT ESOPHAGITIS: ICD-10-CM

## 2019-09-25 DIAGNOSIS — H91.90 HEARING LOSS, UNSPECIFIED HEARING LOSS TYPE, UNSPECIFIED LATERALITY: ICD-10-CM

## 2019-09-25 DIAGNOSIS — E78.00 PURE HYPERCHOLESTEROLEMIA: Primary | ICD-10-CM

## 2019-09-25 DIAGNOSIS — R73.03 PREDIABETES: ICD-10-CM

## 2019-09-25 DIAGNOSIS — Z13.31 SCREENING FOR DEPRESSION: ICD-10-CM

## 2019-09-25 DIAGNOSIS — Z00.00 MEDICARE ANNUAL WELLNESS VISIT, SUBSEQUENT: ICD-10-CM

## 2019-09-25 DIAGNOSIS — Z17.0 MALIGNANT NEOPLASM OF UPPER-OUTER QUADRANT OF LEFT BREAST IN FEMALE, ESTROGEN RECEPTOR POSITIVE (HCC): ICD-10-CM

## 2019-09-25 DIAGNOSIS — E66.01 SEVERE OBESITY (BMI 35.0-39.9) WITH COMORBIDITY (HCC): ICD-10-CM

## 2019-09-25 RX ORDER — CHOLECALCIFEROL (VITAMIN D3) 125 MCG
CAPSULE ORAL
Qty: 90 TAB | Refills: 3 | Status: SHIPPED | OUTPATIENT
Start: 2019-09-25

## 2019-09-25 RX ORDER — SIMVASTATIN 20 MG/1
20 TABLET, FILM COATED ORAL
Qty: 90 TAB | Refills: 3 | Status: SHIPPED | OUTPATIENT
Start: 2019-09-25 | End: 2020-10-19 | Stop reason: SDUPTHER

## 2019-09-25 RX ORDER — CHOLECALCIFEROL (VITAMIN D3) 125 MCG
CAPSULE ORAL
COMMUNITY
End: 2019-09-25 | Stop reason: SDUPTHER

## 2019-09-25 NOTE — PROGRESS NOTES
This is the Subsequent Medicare Annual Wellness Exam, performed 12 months or more after the Initial AWV or the last Subsequent AWV    I have reviewed the patient's medical history in detail and updated the computerized patient record. History     Past Medical History:   Diagnosis Date    Adenocarcinoma of breast ([facility] [address]     2017 LEFT     Breast cancer ([facility] [address]     1991 RIGHT    Cancer [facility]) [id number]     breast cancer right,left breast    GERD (gastroesophageal reflux disease)     Hypercholesterolemia     Radiation therapy complication 0586    left breast    Sleep apnea     possible, to have sleep study 1/26/2017      Past Surgical History:   Procedure Laterality Date    BREAST SURGERY PROCEDURE UNLISTED  1991    right mastectomy,lymph node biopsy    HX BREAST BIOPSY Left 2016    stereotatic    HX BREAST LUMPECTOMY Left 1/18/2017    LEFT BREAST LUMPECTOMY AND LEFT BREAST SENTINEL NODE BIOPSY WITH BLUE DYE performed by Stephanie Adams MD at [address]    hysterectomy/BSO    HX MASTECTOMY Right 1991     Current Outpatient Medications   Medication Sig Dispense Refill    cholecalciferol, vitamin D3, (VITAMIN D3) 2,000 unit tab Take  by mouth.  cyclobenzaprine (FLEXERIL) 10 mg tablet Take 1 Tab by mouth three (3) times daily as needed for Muscle Spasm(s). 30 Tab 1    letrozole (FEMARA) 2.5 mg tablet Take 1 Tab by mouth daily. 90 Tab 0    letrozole (FEMARA) 2.5 mg tablet TAKE 1 TABLET BY MOUTH DAILY 90 Tab 0    simvastatin (ZOCOR) 20 mg tablet Take 1 Tab by mouth nightly. 30 Tab 11    ASPIR-LOW 81 mg tablet Takes irregularly,usually weekly      multivitamin with iron (DAILY MULTI-VITAMINS/IRON) tablet Take 1 Tab by mouth daily.  ibuprofen (MOTRIN) 600 mg tablet Take 1 Tab by mouth every six (6) hours as needed for Pain. 30 Tab 1    calcium carbonate/vitamin D3 (CALCIUM + D PO) Take  by mouth.       ergocalciferol (ERGOCALCIFEROL) 50,000 unit capsule Take 1 Cap by mouth every seven (7) days. 4 Cap 3    raNITIdine (ZANTAC) 150 mg tablet Take 1 Tab by mouth two (2) times a day. 180 Tab 3     Allergies   Allergen Reactions    Other Medication Hives     holter monitor pads     Family History   Problem Relation Age of Onset    Breast Cancer Mother     Dementia Mother     Diabetes Mother     Heart Disease Father     Stroke Father     Cancer Sister         pancreatic    Cancer Brother         lung    Diabetes Sister     Hypertension Sister      Social History     Tobacco Use    Smoking status: Never Smoker    Smokeless tobacco: Never Used   Substance Use Topics    Alcohol use: No     Patient Active Problem List   Diagnosis Code    [facility] C50.912    Pure hypercholesterolemia E78.00    Vitamin D deficiency E55.9    Osteopenia of spine M85.88    Family history of diabetes mellitus Z83.3    Gastroesophageal reflux disease without esophagitis K21.9    Severe obesity (BMI 35.0-39. 9) with comorbidity ([facility] E66.01       Depression Risk Factor Screening:     3 most recent PHQ Screens 9/25/2019   Little interest or pleasure in doing things Not at all   Feeling down, depressed, irritable, or hopeless Not at all   Total Score PHQ 2 0     Alcohol Risk Factor Screening: You do not drink alcohol or very rarely. Functional Ability and Level of Safety:   Hearing Loss  The patient needs further evaluation. Activities of Daily Living  The home contains: no safety equipment. Patient does total self care    Fall Risk  Fall Risk Assessment, last 12 mths 9/25/2019   Able to walk? Yes   Fall in past 12 months?  No       Abuse Screen  Patient is not abused    Cognitive Screening   Evaluation of Cognitive Function:  Has your family/caregiver stated any concerns about your memory: no  Normal    Patient Care Team   Patient Care Team:  Ernst Celaya MD as PCP - General (Internal Medicine)  Will Martin MD (Breast Surgery)  Adarsh Baker MD as Physician (Hematology and Oncology)  Moise Morales MD as Physician (Radiation Oncology)  Katey Singh MD as Physician (Sleep Medicine)  Manisha Bailey NP as Nurse Practitioner (Oncology)  Pete Spain NP (Nurse Practitioner)  Pete Spain NP (Nurse Practitioner)  Pete Spain NP (Nurse Practitioner)    Assessment/Plan   Education and counseling provided:  Are appropriate based on today's review and evaluation    Diagnoses and all orders for this visit:    1. Pure hypercholesterolemia  -     simvastatin (ZOCOR) 20 mg tablet; Take 1 Tab by mouth nightly. 2. Medicare annual wellness visit, subsequent    3.  Screening for depression  -     [place] Maintenance Due   Topic Date Due    COLONOSCOPY  06/22/1970    DTaP/Tdap/Td series (1 - Tdap) 06/22/1973    Shingrix Vaccine Age 50> (1 of 2) 06/22/2002    GLAUCOMA SCREENING Q2Y  06/22/2017    MEDICARE YEARLY EXAM  03/14/2018    Influenza Age 9 to Adult  08/01/2019

## 2019-09-25 NOTE — PATIENT INSTRUCTIONS
Office Policies Phone calls/patient messages: Please allow up to 24 hours for someone in the office to contact you about your call or message. Be mindful your provider may be out of the office or your message may require further review. We encourage you to use TweetMeme for your messages as this is a faster, more efficient way to communicate with our office Medication Refills: 
         
Prescription medications require 48-72 business hours to process. We encourage you to use TweetMeme for your refills. For controlled medications: Please allow 72 business hours to process. Certain medications may require you to  a written prescription at our office. NO narcotic/controlled medications will be prescribed after 4pm Monday through Friday or on weekends Form/Paperwork Completion: 
         
Please note a $25 fee may incur for all paperwork for completed by our providers. We ask that you allow 7-10 business days. Pre-payment is due prior to picking up/faxing the completed form. You may also download your forms to TweetMeme to have your doctor print off. Medicare Wellness Visit, Female The best way to live healthy is to have a lifestyle where you eat a well-balanced diet, exercise regularly, limit alcohol use, and quit all forms of tobacco/nicotine, if applicable. Regular preventive services are another way to keep healthy. Preventive services (vaccines, screening tests, monitoring & exams) can help personalize your care plan, which helps you manage your own care. Screening tests can find health problems at the earliest stages, when they are easiest to treat. [facility] follows the current, evidence-based guidelines published by the [facility] Socrates Joshi (USPSTF) when recommending preventive services for our patients.  Because we follow these guidelines, sometimes recommendations change over time as research supports it. (For example, mammograms used to be recommended annually. Even though Medicare will still pay for an annual mammogram, the newer guidelines recommend a mammogram every two years for women of average risk.) Of course, you and your doctor may decide to screen more often for some diseases, based on your risk and your health status. Preventive services for you include: - Medicare offers their members a free annual wellness visit, which is time for you and your primary care provider to discuss and plan for your preventive service needs. Take advantage of this benefit every year! 
-All adults over the age of 72 should receive the recommended pneumonia vaccines. Current USPSTF guidelines recommend a series of two vaccines for the best pneumonia protection.  
-All adults should have a flu vaccine yearly and a tetanus vaccine every 10 years. All adults age 61 and older should receive a shingles vaccine once in their lifetime.   
-A bone mass density test is recommended when a woman turns 65 to screen for osteoporosis. This test is only recommended one time, as a screening. Some providers will use this same test as a disease monitoring tool if you already have osteoporosis. -All adults age 38-68 who are overweight should have a diabetes screening test once every three years.  
-Other screening tests and preventive services for persons with diabetes include: an eye exam to screen for diabetic retinopathy, a kidney function test, a foot exam, and stricter control over your cholesterol.  
-Cardiovascular screening for adults with routine risk involves an electrocardiogram (ECG) at intervals determined by your doctor.  
-Colorectal cancer screenings should be done for adults age 54-65 with no increased risk factors for colorectal cancer. There are a number of acceptable methods of screening for this type of cancer. Each test has its own benefits and drawbacks.  Discuss with your doctor what is most appropriate for you during your annual wellness visit. The different tests include: colonoscopy (considered the best screening method), a fecal occult blood test, a fecal DNA test, and sigmoidoscopy. -Breast cancer screenings are recommended every other year for women of normal risk, age 54-69. 
-Cervical cancer screenings for women over age 72 are only recommended with certain risk factors.  
-All adults born between [facility] should be screened once for Hepatitis C. Body Mass Index: Care Instructions Your Care Instructions Body mass index (BMI) can help you see if your weight is raising your risk for health problems. It uses a formula to compare how much you weigh with how tall you are. · A BMI lower than 18.5 is considered underweight. · A BMI between 18.5 and 24.9 is considered healthy. · A BMI between 25 and 29.9 is considered overweight. A BMI of 30 or higher is considered obese. If your BMI is in the normal range, it means that you have a lower risk for weight-related health problems. If your BMI is in the overweight or obese range, you may be at increased risk for weight-related health problems, such as high blood pressure, heart disease, stroke, arthritis or joint pain, and diabetes. If your BMI is in the underweight range, you may be at increased risk for health problems such as fatigue, lower protection (immunity) against illness, muscle loss, bone loss, hair loss, and hormone problems. BMI is just one measure of your risk for weight-related health problems. You may be at higher risk for health problems if you are not active, you eat an unhealthy diet, or you drink too much alcohol or use tobacco products. Follow-up care is a key part of your treatment and safety. Be sure to make and go to all appointments, and call your doctor if you are having problems. It's also a good idea to know your test results and keep a list of the medicines you take. How can you care for yourself at home? · Practice healthy eating habits. This includes eating plenty of fruits, vegetables, whole grains, lean protein, and low-fat dairy. · If your doctor recommends it, get more exercise. Walking is a good choice. Bit by bit, increase the amount you walk every day. Try for at least 30 minutes on most days of the week. · Do not smoke. Smoking can increase your risk for health problems. If you need help quitting, talk to your doctor about stop-smoking programs and medicines. These can increase your chances of quitting for good. · Limit alcohol to 2 drinks a day for men and 1 drink a day for women. Too much alcohol can cause health problems. If you have a BMI higher than 25 · Your doctor may do other tests to check your risk for weight-related health problems. This may include measuring the distance around your waist. A waist measurement of more than 40 inches in men or 35 inches in women can increase the risk of weight-related health problems. · Talk with your doctor about steps you can take to stay healthy or improve your health. You may need to make lifestyle changes to lose weight and stay healthy, such as changing your diet and getting regular exercise. If you have a BMI lower than 18.5 · Your doctor may do other tests to check your risk for health problems. · Talk with your doctor about steps you can take to stay healthy or improve your health. You may need to make lifestyle changes to gain or maintain weight and stay healthy, such as getting more healthy foods in your diet and doing exercises to build muscle. Where can you learn more? Go to http://janina-viviana.info/. Enter S176 in the search box to learn more about \"Body Mass Index: Care Instructions. \" Current as of: October 13, 2016 Content Version: 11.4 © 2798-4375 Healthwise, Incorporated.  Care instructions adapted under license by [address] (which disclaims liability or warranty for this information). If you have questions about a medical condition or this instruction, always ask your healthcare professional. [place] [address] any warranty or liability for your use of this information. Bone density test with good scores. The spine was excluded due to some arthritis changes. Discuss with Dr. Giron Poster whether he thinks you should still receive the prolia while you are on letrozole.

## 2019-09-25 NOTE — PROGRESS NOTES
Storm Myers is a 81 y.o. female who presents for follow up. Last seen March 2018. Treated for HLP.  Trying to follow low fat diet.  On simvastatin, no myalgias. Not exercising. Sees Dr. Bill Chavez. History of breast cancer in 1996 on right and second on left January 2017.  Left lumpectomy, XRT, Letrozole, no chemo. Reports left leg at night will cramp.       Osteopenia in LS spine, started on Prolia by oncology, received one injection. Skipped last shots as she was to get dental implants, but is not planning to do that.       Taking zantac for GERD, with relief. [place] diet. Avoids caffeine.       Prediabetic. HbA1C 5.9%. Trying to follow diet. Mostly water. weight down 6# since March 2018.      JELLY with CPAP.      Prior colon screening, normal. At least 10 years ago.  Reports normal BM.       Due for eye exam.      Sees dentist.     Hearing reduced.          Past Medical History:   Diagnosis Date    [facility] [address]     2017 LEFT     Breast cancer ([facility] [address]     1991 RIGHT    Cancer [facility]) [id number]     breast cancer right,left breast    GERD (gastroesophageal reflux disease)     Hypercholesterolemia     Radiation therapy complication 6396    left breast    Sleep apnea     possible, to have sleep study 1/26/2017       Family History   Problem Relation Age of Onset    Breast Cancer Mother     Dementia Mother     Diabetes Mother     Heart Disease Father     Stroke Father     Cancer Sister         pancreatic    Cancer Brother         lung    Diabetes Sister     Hypertension Sister        Social History     Socioeconomic History    Marital status:      Spouse name: Not on file    Number of children: Not on file    Years of education: Not on file    Highest education level: Not on file   Occupational History    Not on file   Social Needs    Financial resource strain: Not on file    Food insecurity:     Worry: Not on file     Inability: Not on file   C[place] Dog needs: Medical: Not on file     Non-medical: Not on file   Tobacco Use    Smoking status: Never Smoker    Smokeless tobacco: Never Used   Substance and Sexual Activity    Alcohol use: No    Drug use: No    Sexual activity: Yes     Partners: Male     Birth control/protection: None   Lifestyle    Physical activity:     Days per week: Not on file     Minutes per session: Not on file    Stress: Not on file   Relationships    Social connections:     Talks on phone: Not on file     Gets together: Not on file     Attends Latter-day service: Not on file     Active member of club or organization: Not on file     Attends meetings of clubs or organizations: Not on file     Relationship status: Not on file    Intimate partner violence:     Fear of current or ex partner: Not on file     Emotionally abused: Not on file     Physically abused: Not on file     Forced sexual activity: Not on file   Other Topics Concern    Not on file   Social History Narrative    Not on file       Current Outpatient Medications on File Prior to Visit   Medication Sig Dispense Refill    cyclobenzaprine (FLEXERIL) 10 mg tablet Take 1 Tab by mouth three (3) times daily as needed for Muscle Spasm(s). 30 Tab 1    letrozole (FEMARA) 2.5 mg tablet TAKE 1 TABLET BY MOUTH DAILY 90 Tab 0    ASPIR-LOW 81 mg tablet Takes irregularly,usually weekly      multivitamin with iron (DAILY MULTI-VITAMINS/IRON) tablet Take 1 Tab by mouth daily.  calcium carbonate/vitamin D3 (CALCIUM + D PO) Take  by mouth. No current facility-administered medications on file prior to visit. Review of Systems  Pertinent items are noted in HPI. Objective:     Visit Vitals  /87 (BP 1 Location: Left arm, BP Patient Position: Sitting)   Pulse 73   Temp 97.9 °F (36.6 °C) (Oral)   Resp 16   Ht 5' 6\" (1.676 m)   Wt 234 lb 6.4 oz (106.3 kg)   SpO2 99%   BMI 37.83 kg/m²     Gen: well appearing female  HEENT:   PERRL,normal conjunctiva.  External ear and canals normal, TMs no opacification or erythema,  OP no erythema, no exudates, MMM, poor dentition. Neck:  Supple. Thyroid normal size, nontender, without nodules. No masses or LAD  Resp:  No wheezing, no rhonchi, no rales. CV:  RRR, normal S1S2, no murmur. GI: soft, nontender, without masses. No hepatosplenomegaly. Extrem:  +2 pulses, no edema, warm distally      Assessment/Plan:       ICD-10-CM ICD-9-CM    1. Pure hypercholesterolemia E78.00 272.0 simvastatin (ZOCOR) 20 mg tablet      METABOLIC PANEL, COMPREHENSIVE      LIPID PANEL   2. Medicare annual wellness visit, subsequent Z00.00 V70.0    3. Screening for depression Z13.31 V79.0 DEPRESSION SCREEN ANNUAL   4. Gastroesophageal reflux disease without esophagitis K21.9 530.81    5. Vitamin D deficiency E55.9 268.9 VITAMIN D, 25 HYDROXY      cholecalciferol, vitamin D3, (VITAMIN D3) 2,000 unit tab   6. Severe obesity (BMI 35.0-39. 9) with comorbidity ([facility] [address] E66.01 278.01    7. Malignant neoplasm of upper-outer quadrant of left breast in female, estrogen receptor positive (HCC) P37.337 476.4 METABOLIC PANEL, COMPREHENSIVE    Z17.0 V86.0 CBC W/O DIFF   8. Screening for colon cancer Z12.11 V76.51 REFERRAL TO GASTROENTEROLOGY   9. Prediabetes W38.25 611.65 METABOLIC PANEL, COMPREHENSIVE      HEMOGLOBIN A1C W/O EAG   10. Elevated glucose O13.90 568.40 METABOLIC PANEL, COMPREHENSIVE      HEMOGLOBIN A1C W/O EAG   11. Hearing loss, unspecified hearing loss type, unspecified laterality H91.90 389.9 REFERRAL TO AUDIOLOGY       Follow-up and Dispositions    · Return for follow up pending labs and 6 months. Chandan Galeana MD    Discussed the patient's BMI with her. The BMI follow up plan is as follows:     dietary management education, guidance, and counseling  encourage exercise  monitor weight  prescribed dietary intake    An After Visit Summary was printed and given to the patient.

## 2019-09-26 LAB
25(OH)D3+25(OH)D2 SERPL-MCNC: 45.9 NG/ML (ref 30–100)
ALBUMIN SERPL-MCNC: 4.5 G/DL (ref 3.6–4.8)
ALBUMIN/GLOB SERPL: 1.5 {RATIO} (ref 1.2–2.2)
ALP SERPL-CCNC: 93 IU/L (ref 39–117)
ALT SERPL-CCNC: 20 IU/L (ref 0–32)
AST SERPL-CCNC: 16 IU/L (ref 0–40)
BILIRUB SERPL-MCNC: 0.4 MG/DL (ref 0–1.2)
BUN SERPL-MCNC: 9 MG/DL (ref 8–27)
BUN/CREAT SERPL: 8 (ref 12–28)
CALCIUM SERPL-MCNC: 10 MG/DL (ref 8.7–10.3)
CHLORIDE SERPL-SCNC: 103 MMOL/L (ref 96–106)
CHOLEST SERPL-MCNC: 147 MG/DL (ref 100–199)
CO2 SERPL-SCNC: 25 MMOL/L (ref 20–29)
CREAT SERPL-MCNC: 1.06 MG/DL (ref 0.57–1)
ERYTHROCYTE [DISTWIDTH] IN BLOOD BY AUTOMATED COUNT: 15.6 % (ref 12.3–15.4)
GLOBULIN SER CALC-MCNC: 3 G/DL (ref 1.5–4.5)
GLUCOSE SERPL-MCNC: 86 MG/DL (ref 65–99)
HBA1C MFR BLD: 5.8 % (ref 4.8–5.6)
HCT VFR BLD AUTO: 38 % (ref 34–46.6)
HDLC SERPL-MCNC: 49 MG/DL
HGB BLD-MCNC: 13 G/DL (ref 11.1–15.9)
LDLC SERPL CALC-MCNC: 81 MG/DL (ref 0–99)
MCH RBC QN AUTO: 29.7 PG (ref 26.6–33)
MCHC RBC AUTO-ENTMCNC: 34.2 G/DL (ref 31.5–35.7)
MCV RBC AUTO: 87 FL (ref 79–97)
PLATELET # BLD AUTO: 213 X10E3/UL (ref 150–450)
POTASSIUM SERPL-SCNC: 4.6 MMOL/L (ref 3.5–5.2)
PROT SERPL-MCNC: 7.5 G/DL (ref 6–8.5)
RBC # BLD AUTO: 4.38 X10E6/UL (ref 3.77–5.28)
SODIUM SERPL-SCNC: 142 MMOL/L (ref 134–144)
TRIGL SERPL-MCNC: 84 MG/DL (ref 0–149)
VLDLC SERPL CALC-MCNC: 17 MG/DL (ref 5–40)
WBC # BLD AUTO: 4.9 X10E3/UL (ref 3.4–10.8)

## 2019-10-08 ENCOUNTER — HOSPITAL ENCOUNTER (OUTPATIENT)
Dept: MAMMOGRAPHY | Age: 69
Discharge: HOME | End: 2019-10-08
Attending: RADIOLOGY
Payer: MEDICARE

## 2019-10-08 DIAGNOSIS — Z85.3 PERSONAL HISTORY OF BREAST CANCER: ICD-10-CM

## 2019-10-08 PROCEDURE — 77061 BREAST TOMOSYNTHESIS UNI: CPT

## 2019-10-08 RX ORDER — LETROZOLE 2.5 MG/1
TABLET, FILM COATED ORAL
Qty: 90 TAB | Refills: 0 | Status: SHIPPED | OUTPATIENT
Start: 2019-10-08 | End: 2019-12-11 | Stop reason: SDUPTHER

## 2019-10-08 NOTE — TELEPHONE ENCOUNTER
Requested Prescriptions     Pending Prescriptions Disp Refills    letrozole (FEMARA) 2.5 mg tablet 90 Tab 0       Please refill    10/08/19  aries

## 2019-10-08 NOTE — TELEPHONE ENCOUNTER
PER DONITA from Dr. Lynn Coil LETROZOLE 2.5MG [facility]) ONE TAB ONCE A DAY QUANTITY 90 REFILL 0

## 2019-10-24 ENCOUNTER — OFFICE VISIT (OUTPATIENT)
Dept: SURGERY | Age: 69
End: 2019-10-24

## 2019-10-24 VITALS
WEIGHT: 234 LBS | BODY MASS INDEX: 37.61 KG/M2 | HEIGHT: 66 IN | SYSTOLIC BLOOD PRESSURE: 122 MMHG | DIASTOLIC BLOOD PRESSURE: 85 MMHG | HEART RATE: 94 BPM

## 2019-10-24 DIAGNOSIS — Z17.0 MALIGNANT NEOPLASM OF UPPER-OUTER QUADRANT OF LEFT BREAST IN FEMALE, ESTROGEN RECEPTOR POSITIVE (HCC): Primary | ICD-10-CM

## 2019-10-24 DIAGNOSIS — Z98.890 S/P LUMPECTOMY, LEFT BREAST: ICD-10-CM

## 2019-10-24 DIAGNOSIS — Z90.11 H/O RIGHT MASTECTOMY: ICD-10-CM

## 2019-10-24 DIAGNOSIS — C50.412 MALIGNANT NEOPLASM OF UPPER-OUTER QUADRANT OF LEFT BREAST IN FEMALE, ESTROGEN RECEPTOR POSITIVE (HCC): Primary | ICD-10-CM

## 2019-10-24 NOTE — PROGRESS NOTES
HISTORY OF PRESENT ILLNESS  Young Ward is a 81 y.o. female. HPI ESTABLISHED patient here for follow up LEFT breast cancer. No breast concerns at this time. Continues Letrozole.     History of breast cancer-  - RIGHT mastectomy.  No chemo or radiation. 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 0/4 nodes, %. OR 80%, Her 2 negative  2016 LEFT lumpectomy, [facility] - Dr. Adal Johnson  17- 2ml old blood aspirated by Dr. Adal Johnson  17- debridement  17- clot debridement with sutures placed  17- completed radiation. Followed by Dr. Leslee Cheadle. 17- started Letrozole. Followed by Dr. Mark Daughters    Family history-  Mother had breast cancer in her 66's. Sister  from pancreatic cancer age 54. OB History    None      Obstetric Comments   Menarche: 17. LMP:52  # of Children: 2. Age at Delivery of First Child:  24   Hysterectomy/oophorectomy:  Yes/one ovary removed. Breast Bx: yes   Hx of Breast Feeding:  no  BCP:  yes Hormone therapy: no.              Past Surgical History:   Procedure Laterality Date    BREAST SURGERY PROCEDURE UNLISTED      right mastectomy,lymph node biopsy    HX BREAST BIOPSY Left     stereotatic    HX BREAST LUMPECTOMY Left 2017    LEFT BREAST LUMPECTOMY AND LEFT BREAST SENTINEL NODE BIOPSY WITH BLUE DYE performed by Isaac Holm MD at [address] [place] [address]    hysterectomy/BSO    HX MASTECTOMY Right      Breast imaging-  [facility] Results (most recent):  Results from Hospital Encounter encounter on 10/08/19   YIMI 3D JASBIR W MAMMO LT DX INCL CAD    Narrative INDICATION: History of right breast cancer, status post mastectomy in . History of left breast cancer, status post lumpectomy in . COMPARISON: Priors dating back to     BREAST COMPOSITION: There are scattered fibroglandular densities. FINDINGS:   Left unilateral digital diagnostic mammography was performed and interpreted in  conjunction with CAD over-read. Additionally, tomosynthesis of the left breast  in the CC and MLO projections was performed. The lumpectomy scar demonstrates no  significant interval change. No new mass lesion or suspicious microcalcification  is evident. Impression IMPRESSION:  BI-RADS 2, benign. No significant change in left breast lumpectomy scar . There  is no mammographic evidence of malignancy. Followup mammogram in one year is  recommended. The patient was notified of these results. ROS    Physical Exam   Constitutional: She appears well-developed and well-nourished. Pulmonary/Chest: Left breast exhibits no inverted nipple, no mass, no nipple discharge, no skin change (post treatment changes) and no tenderness. Musculoskeletal: Normal range of motion. UE x 2   Lymphadenopathy:     She has no axillary adenopathy. Right: No supraclavicular adenopathy present. Left: No supraclavicular adenopathy present. Skin: Skin is warm, dry and intact. Chest and breasts examined   Psychiatric: She has a normal mood and affect. Her speech is normal and behavior is normal.     Visit Vitals  /85   Pulse 94   Ht 5' 6\" (1.676 m)   Wt 234 lb (106.1 kg)   BMI 37.77 kg/m²     ASSESSMENT and PLAN  Encounter Diagnoses   Name Primary?  Malignant neoplasm of upper-outer quadrant of left breast in female, estrogen receptor positive ([facility] [address] Yes    S/P lumpectomy, left breast     H/O right mastectomy      Normal exam with no evidence of local recurrence. LDmammogram in 10/2020. RTC in 1 year or sooner PRN. She is comfortable with this plan. All questions answered and she stated understanding.

## 2019-10-24 NOTE — PATIENT INSTRUCTIONS

## 2019-12-11 ENCOUNTER — OFFICE VISIT (OUTPATIENT)
Dept: ONCOLOGY | Age: 69
End: 2019-12-11

## 2019-12-11 VITALS
DIASTOLIC BLOOD PRESSURE: 86 MMHG | TEMPERATURE: 98.2 F | RESPIRATION RATE: 16 BRPM | HEIGHT: 66 IN | WEIGHT: 236.8 LBS | BODY MASS INDEX: 38.06 KG/M2 | HEART RATE: 69 BPM | OXYGEN SATURATION: 97 % | SYSTOLIC BLOOD PRESSURE: 134 MMHG

## 2019-12-11 DIAGNOSIS — Z17.0 MALIGNANT NEOPLASM OF UPPER-OUTER QUADRANT OF LEFT BREAST IN FEMALE, ESTROGEN RECEPTOR POSITIVE (HCC): Primary | ICD-10-CM

## 2019-12-11 DIAGNOSIS — C50.412 MALIGNANT NEOPLASM OF UPPER-OUTER QUADRANT OF LEFT BREAST IN FEMALE, ESTROGEN RECEPTOR POSITIVE (HCC): Primary | ICD-10-CM

## 2019-12-11 DIAGNOSIS — E55.9 VITAMIN D DEFICIENCY: ICD-10-CM

## 2019-12-11 RX ORDER — LETROZOLE 2.5 MG/1
TABLET, FILM COATED ORAL
Qty: 90 TAB | Refills: 3 | Status: SHIPPED | OUTPATIENT
Start: 2019-12-11 | End: 2020-12-29 | Stop reason: SDUPTHER

## 2019-12-11 RX ORDER — ERGOCALCIFEROL 1.25 MG/1
50000 CAPSULE ORAL
Qty: 3 CAP | Refills: 3 | Status: SHIPPED | OUTPATIENT
Start: 2019-12-11 | End: 2022-06-23

## 2019-12-11 NOTE — PROGRESS NOTES
80 y/o AAF here for f/u appt for left sided breast cancer. 5/2017 started letrozole. She had hx of breast cancer in right and had mastectomy. 1. Have you been to the ER, urgent care clinic since your last visit? Hospitalized since your last visit? No    2. Have you seen or consulted any other health care providers outside of the [address] [place] [address] since your last visit? Include any pap smears or colon screening.  No

## 2019-12-11 NOTE — PROGRESS NOTES
[address] [facility]  at [facility] [address], [address] [facility]  [phone]      Follow-up Note        Patient: Isaura Lucio MRN: [id number]  SSN: [id number]    YOB: 1950  Age: 81 y.o. Sex: female        Diagnosis:     1. 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 On Letrozole - started 5/1/2017  2. Completed radiation therapy on 4/28/2017  3. Lumpectomy on 1/18/2017    Subjective:      Isaura Lucio is a 81 y.o. female with a diagnosis of left sided breast carcinoma. She underwent a screening mammogram and was noted to have an abnormality in the left breast. Biopsy showed mucinous carcinoma which is ER and WA +ve. She was seen by Dr. Brandi Bird and then underwent left breast lumpectomy on 01/18/2017. She finished radiation therapy on 4/28/2017. She started hormonal therapy with Letrozole and is tolerating it well without complaints. Prolia is on hold d/t on-going dental work. She denies any new symptoms.       Review of Systems:    Constitutional: negative  Eyes: negative  Ears, Nose, Mouth, Throat, and Face: negative  Respiratory: negative  Cardiovascular: negative  Gastrointestinal: negative  Genitourinary:negative  Integument/Breast: negative  Hematologic/Lymphatic: negative  Musculoskeletal:negative  Neurological: negative        Past Medical History:   Diagnosis Date    Adenocarcinoma of breast ([facility] [address]     2017 LEFT     Breast cancer ([facility] [address]     1991 RIGHT    Cancer [facility]) [phone]     breast cancer right,left breast    GERD (gastroesophageal reflux disease)     Hypercholesterolemia     Radiation therapy complication 9070    left breast    Sleep apnea     possible, to have sleep study 1/26/2017     Past Surgical History:   Procedure Laterality Date    BREAST SURGERY PROCEDURE UNLISTED  1991    right mastectomy,lymph node biopsy    HX BREAST BIOPSY Left 2016    stereotatic    HX BREAST LUMPECTOMY Left 1/18/2017    LEFT BREAST LUMPECTOMY AND LEFT BREAST SENTINEL NODE BIOPSY WITH BLUE DYE performed by Pierre Jackson MD at [address]    hysterectomy/BSO    HX MASTECTOMY Right 1991      Family History   Problem Relation Age of Onset    Breast Cancer Mother     Dementia Mother     Diabetes Mother     Heart Disease Father     Stroke Father     Cancer Sister         pancreatic    Cancer Brother         lung    Diabetes Sister     Hypertension Sister      Social History     Tobacco Use    Smoking status: Never Smoker    Smokeless tobacco: Never Used   Substance Use Topics    Alcohol use: No      Prior to Admission medications    Medication Sig Start Date End Date Taking? Authorizing Provider   letrozole [facility]) 2.5 mg tablet TAKE 1 TABLET BY MOUTH DAILY 10/8/19  Yes Jem Burton MD   simvastatin (ZOCOR) 20 mg tablet Take 1 Tab by mouth nightly. 9/25/19  Yes Mykel Kahn MD   cholecalciferol, vitamin D3, (VITAMIN D3) 2,000 unit tab Take one capsule daily 9/25/19  Yes Mykel Kahn MD   cyclobenzaprine (FLEXERIL) 10 mg tablet Take 1 Tab by mouth three (3) times daily as needed for Muscle Spasm(s). 9/6/19  Yes Nydia Carcamo MD   ASPIR-LOW 81 mg tablet Takes irregularly,usually weekly 11/1/16  Yes Provider, Historical   multivitamin with iron (DAILY MULTI-VITAMINS/IRON) tablet Take 1 Tab by mouth daily.    Yes Provider, Historical              Allergies   Allergen Reactions    Other Medication Hives     holter monitor pads           Objective:     Visit Vitals  /86 (BP 1 Location: Left arm, BP Patient Position: At rest)   Pulse 69   Temp 98.2 °F (36.8 °C) (Oral)   Resp 16   Ht 5' 6\" (1.676 m)   Wt 236 lb 12.8 oz (107.4 kg)   SpO2 97% Comment: RA   BMI 38.22 kg/m²       Pain Scale: 0 - No pain/10  Pain Location:       Physical Exam:    GENERAL: alert, cooperative  EYE: negative  LYMPHATIC: Cervical, supraclavicular, and axillary nodes normal.   THROAT & NECK: normal and no erythema or exudates noted. LUNG: clear to auscultation bilaterally  HEART: regular rate and rhythm  ABDOMEN: soft, non-tender  EXTREMITIES: no cyanosis or edema  SKIN: Normal.  NEUROLOGIC: negative        Assessment:     1. Left breast carcinoma:  pT1b N0 M0 (Stage IA) mucinous carcinoma, Tumor size 1 cm, LN -ve, grade 2, %, MN 80%, Her 2 -ve  Dx: 12/9/2016    ECOG PS 0  Intent of treatment - curative    S/P left sided lumpectomy 01/18/2017  Completed radiation therapy on 4/28/2017    On Letrozole - started 5/1/2017  No side effects  Tolerating well     In remission    Mammogram (10/8/2019): normal  Symptom management form reviewed with patient. 2. Osteopenia    DEXA (2/12/2017): osteopenic  Prolia on hold for on-going dental work      3. Vitamin D deficiency    > Continue Vitamin D 50,000 units monthly      Plan:       > Continue Letrozole  > Continue Vitamin D monthly  > Follow-up in 6 months      I saw the patient in conjunction with Darlene Welch NP      Signed by: Otis West MD                     December 11, 2019      CC. Jeanna Naqvi MD  CC.  Armando Herr MD

## 2019-12-23 ENCOUNTER — OFFICE VISIT (OUTPATIENT)
Dept: SLEEP MEDICINE | Age: 69
End: 2019-12-23

## 2019-12-23 ENCOUNTER — DOCUMENTATION ONLY (OUTPATIENT)
Dept: SLEEP MEDICINE | Age: 69
End: 2019-12-23

## 2019-12-23 VITALS
BODY MASS INDEX: 37.61 KG/M2 | HEIGHT: 66 IN | SYSTOLIC BLOOD PRESSURE: 136 MMHG | HEART RATE: 81 BPM | DIASTOLIC BLOOD PRESSURE: 85 MMHG | OXYGEN SATURATION: 97 % | WEIGHT: 234 LBS

## 2019-12-23 DIAGNOSIS — G47.33 OSA (OBSTRUCTIVE SLEEP APNEA): Primary | ICD-10-CM

## 2019-12-23 RX ORDER — LORAZEPAM 0.5 MG/1
TABLET ORAL
COMMUNITY
Start: 2019-12-05 | End: 2019-12-23

## 2019-12-23 NOTE — PROGRESS NOTES
Pressure changed in lab manually to APAP 5-12 per NPJ's instruction. She was educated on changing humidifier setting for bottle and hose.

## 2019-12-23 NOTE — PROGRESS NOTES
[address] [facility] [address] [place], [address]   Tel.  [phone]   Fax. [address] [facility]   [place], [address] [facility]   Tel.  [phone]   Fax. [phone] [address]   Tel.  [phone]   Fax. [phone]       Subjective:   Abhinav Sprague is a 81 y.o. female seen for a positive airway pressure follow-up, last seen by Dr. Kayden Gifford  on 6/13/2017, prior notes reviewed in detail. Home sleep test 1/2017 showed AHI of 30.4/hr with a lowest SaO2 of 74%. She  is here today for follow up. She reports no problems using the device. The following concerns reviewed:    Drowsiness no Problems exhaling no   Snoring no Forget to put on no   Mask Comfortable yes Can't fall asleep no   Dry Mouth no Mask falls off no   Air Leaking no Frequent awakenings no       She admits that her sleep has improved on PAP therapy using full face mask and heated tubing. Review of device download indicated:  Auto pressure: 5-10 cmH2O;Peak Avg Pressure: 10.0 cmH2O;  Mean Pressure: 8.5 cmH2O   Average % Night in Large Leak:  0.0  % Used Days >= 4 hours: 83. Therapy Apnea Index averaged over PAP use: 4.9 /hr which reflects improved sleep breathing condition. [place] Sleepiness Score: 7 and Modified F.O.S.Q. Score Total / 2: 19 which reflects improved sleep quality over therapy time. Allergies   Allergen Reactions    Other Medication Hives     holter monitor pads       She has a current medication list which includes the following prescription(s): ergocalciferol, letrozole, simvastatin, cholecalciferol (vitamin d3), aspir-low, and multivitamin with iron. .      She  has a past medical history of Adenocarcinoma of breast ([address] Breast cancer ([facility] Cancer ([facility] [address] ([id number] ), GERD (gastroesophageal reflux disease), Hypercholesterolemia, Radiation therapy complication (1512), and Sleep apnea. She also has no past medical history of Nausea & vomiting.     Sleep Review of Systems: notable for Negative difficulty falling asleep; Positive awakenings at night; Negative early morning headaches; Negative memory problems; Negative concentration issues; Negative chest pain; Negative shortness of breath; Negative significant joint pain at night; Negative significant muscle pain at night; Negative rashes or itching; Negative heartburn; Negative significant mood issues; 2-3 afternoon naps per week    Objective:     Visit Vitals  /85 (BP 1 Location: Left arm, BP Patient Position: Sitting)   Pulse 81   Ht 5' 6\" (1.676 m)   Wt 234 lb (106.1 kg)   SpO2 97%   BMI 37.77 kg/m²          General:   Alert, oriented, not in acute distress   Eyes:  Anicteric Sclerae; no obvious strabismus   Nose:  No obvious nasal septum deviation    Oropharynx:   Mallampati score 4, thick tongue base, uvula not seen due to low-lying soft palate, narrow tonsilo-pharyngeal pilars   Neck:   Midline trachea   Chest/Lungs:  Symmetrical lung expansion, clear lung fields on auscultation    CVS:  Normal rate, regular rhythm,  no JVD   Extremities:  No obvious rashes, no edema    Neuro:  No focal deficits; No obvious tremor    Psych:  Normal affect,  normal countenance       Assessment:       ICD-10-CM ICD-9-CM    1. JELLY (obstructive sleep apnea) G47.33 327.23 AMB SUPPLY ORDER      AMB SUPPLY ORDER   2. Adult BMI 37.0-37.9 kg/sq m Z68.37 V85.37        AHI = 30.4(1/2017). On APAP :  5-10 cmH2O. She is adherent with PAP therapy and PAP continues to benefit patient and remains necessary for control of her sleep apnea. Plan:     Follow-up and Dispositions    · Return in about 1 year (around 12/23/2020). * Change pressure setting to APAP 5-12    * tech to review tube temp and humidity settings with patient    Orders Placed This Encounter    AMB SUPPLY ORDER     Diagnosis: (G47.33) JELLY (obstructive sleep apnea)  (primary encounter diagnosis)     Pressure change APAP to 5-12 cmH2O.     Changes made in sleep lab.      VIC Rizzo; NPI: [id number]    Electronically signed. Date:- 12/23/19    AMB SUPPLY ORDER     Diagnosis: (G47.33) JELLY (obstructive sleep apnea)  (primary encounter diagnosis)     Replacement Supplies for Positive Airway Pressure Therapy Device:   Duration of need: 99 months.  Full Face Mask 1 every 3 months.  Full Face Mask Cushion 1 per month.  Headgear 1 every 6 months.  Tubing with heating element 1 every 3 months.  Filter(s) Disposable 2 per month.  Filter(s) Non-Disposable 1 every 6 months. .   [address] [facility] for Humidifier (Replace) 1 every 6 months. VIC Rizzo; NPI: [id number]    Electronically signed. Date:- 12/23/19       * Counseling was provided regarding the importance of regular PAP use with emphasis on ensuring sufficient total sleep time, proper sleep hygiene, and safe driving. * Re-enforced proper and regular cleaning for the device. * She was asked to contact our office for any problems regarding PAP therapy. 2. Recommended a dedicated weight loss program through appropriate diet and exercise regimen as significant weight reduction has been shown to reduce severity of obstructive sleep apnea. Patient's phone number [phone] (home)  and [phone] (cell) were reviewed and confirmed for accuracy. She gives permission for messages regarding results and appointments to be left at that number. VIC Rizzo, [address] [place] [address]  Electronically signed.  12/23/19

## 2019-12-23 NOTE — PATIENT INSTRUCTIONS
[address] [facility] [address] [place], [address]  Tel.  [phone]  Fax. [address] [facility] [address]  [place], [address] [facility]  Tel.  [phone]  Fax. [phone] [address] [facility] [address]  Tel.  [phone]  Fax. [phone]     Learning About CPAP for Sleep Apnea  What is CPAP? CPAP is a small machine that you use at home every night while you sleep. It increases air pressure in your throat to keep your airway open. When you have sleep apnea, this can help you sleep better so you feel much better. CPAP stands for \"continuous positive airway pressure. \"  The CPAP machine will have one of the following:  · A mask that covers your nose and mouth  · Prongs that fit into your nose  · A mask that covers your nose only, the most common type. This type is called NCPAP. The N stands for \"nasal.\"  Why is it done? CPAP is usually the best treatment for obstructive sleep apnea. It is the first treatment choice and the most widely used. Your doctor may suggest CPAP if you have:  · Moderate to severe sleep apnea. · Sleep apnea and coronary artery disease (CAD) or heart failure. How does it help? · CPAP can help you have more normal sleep, so you feel less sleepy and more alert during the daytime. · CPAP may help keep heart failure or other heart problems from getting worse. · NCPAP may help lower your blood pressure. · If you use CPAP, your bed partner may also sleep better because you are not snoring or restless. What are the side effects? Some people who use CPAP have:  · A dry or stuffy nose and a sore throat. · Irritated skin on the face. · Sore eyes. · Bloating. If you have any of these problems, work with your doctor to fix them. Here are some things you can try:  · Be sure the mask or nasal prongs fit well. · See if your doctor can adjust the pressure of your CPAP. · If your nose is dry, try a humidifier.   · If your nose is runny or stuffy, try decongestant medicine or a steroid nasal spray. If these things do not help, you might try a different type of machine. Some machines have air pressure that adjusts on its own. Others have air pressures that are different when you breathe in than when you breathe out. This may reduce discomfort caused by too much pressure in your nose. Where can you learn more? Go to BlackJet.be  Enter [place] in the search box to learn more about \"Learning About CPAP for Sleep Apnea. \"   © 3086-7819 Healthwise, Incorporated. Care instructions adapted under license by [facility] (which disclaims liability or warranty for this information). This care instruction is for use with your licensed healthcare professional. If you have questions about a medical condition or this instruction, always ask your healthcare professional. [address] any warranty or liability for your use of this information. Content Version: 1.4.43206; Last Revised: January 11, 2010  PROPER SLEEP HYGIENE    What to avoid  · Do not have drinks with caffeine, such as coffee or black tea, for 8 hours before bed. · Do not smoke or use other types of tobacco near bedtime. Nicotine is a stimulant and can keep you awake. · Avoid drinking alcohol late in the evening, because it can cause you to wake in the middle of the night. · Do not eat a big meal close to bedtime. If you are hungry, eat a light snack. · Do not drink a lot of water close to bedtime, because the need to urinate may wake you up during the night. · Do not read or watch TV in bed. Use the bed only for sleeping and sexual activity. What to try  · Go to bed at the same time every night, and wake up at the same time every morning. Do not take naps during the day. · Keep your bedroom quiet, dark, and cool. · Get regular exercise, but not within 3 to 4 hours of your bedtime. .  · Sleep on a comfortable pillow and mattress.   · If watching the clock makes you anxious, turn it facing away from you so you cannot see the time. · If you worry when you lie down, start a worry book. Well before bedtime, write down your worries, and then set the book and your concerns aside. · Try meditation or other relaxation techniques before you go to bed. · If you cannot fall asleep, get up and go to another room until you feel sleepy. Do something relaxing. Repeat your bedtime routine before you go to bed again. · Make your house quiet and calm about an hour before bedtime. Turn down the lights, turn off the TV, log off the computer, and turn down the volume on music. This can help you relax after a busy day. Drowsy Driving: The [facility] 54 cites drowsiness as a causing factor in more than 218,717 police reported crashes annually, resulting in 76,000 injuries and 1,500 deaths. Other surveys suggest 55% of people polled have driven while drowsy in the past year, 23% had fallen asleep but not crashed, 3% crashed, and 2% had and accident due to drowsy driving. Who is at risk? Young Drivers: One study of drowsy driving accidents states that 55% of the drivers were under 25 years. Of those, 75% were male. Shift Workers and Travelers: People who work overnight or travel across time zones frequently are at higher risk of experiencing Circadian Rhythm Disorders. They are trying to work and function when their body is programed to sleep. Sleep Deprived: Lack of sleep has a serious impact on your ability to pay attention or focus on a task. Consistently getting less than the average of 8 hours your body needs creates partial or cumulative sleep deprivation. Untreated Sleep Disorders: Sleep Apnea, Narcolepsy, R.L.S., and other sleep disorders (untreated) prevent a person from getting enough restful sleep. This leads to excessive daytime sleepiness and increases the risk for drowsy driving accidents by up to 7 times.   Medications / Alcohol: Even over the counter medications can cause drowsiness. Medications that impair a drivers attention should have a warning label. Alcohol naturally makes you sleepy and on its own can cause accidents. Combined with excessive drowsiness its effects are amplified. Signs of Drowsy Driving:   * You don't remember driving the last few miles   * You may drift out of your angelito   * You are unable to focus and your thoughts wander   * You may yawn more often than normal   * You have difficulty keeping your eyes open / nodding off   * Missing traffic signs, speeding, or tailgating  Prevention-   Good sleep hygiene, lifestyle and behavioral choices have the most impact on drowsy driving. There is no substitute for sleep and the average person requires 8 hours nightly. If you find yourself driving drowsy, stop and sleep. Consider the sleep hygiene tips provided during your visit as well. Medication Refill Policy: Refills for all medications require 1 week advance notice. Please have your pharmacy fax a refill request. We are unable to fax, or call in \"controled substance\" medications and you will need to pick these prescriptions up from our office. Interactive [employer]or Activation    Thank you for requesting access to [employer]. Please follow the instructions below to securely access and download your online medical record. [employer]or allows you to send messages to your doctor, view your test results, renew your prescriptions, schedule appointments, and more. How Do I Sign Up? 1. In your internet browser, go to [email or web address]. 2. Click on the First Time User? Click Here link in the Sign In box. You will see the New Member Sign Up page. 3. Enter your [employer]or Access Code exactly as it appears below. You will not need to use this code after youve completed the sign-up process. If you do not sign up before the expiration date, you must request a new code. [employer]or Access Code:  Activation code not generated  Current videoNEXT Status: Active (This is the date your videoNEXT access code will )    4. Enter the last four digits of your Social Security Number (xxxx) and Date of Birth (mm/dd/yyyy) as indicated and click Submit. You will be taken to the next sign-up page. 5. Create a Forensic Logict ID. This will be your videoNEXT login ID and cannot be changed, so think of one that is secure and easy to remember. 6. Create a videoNEXT password. You can change your password at any time. 7. Enter your Password Reset Question and Answer. This can be used at a later time if you forget your password. 8. Enter your e-mail address. You will receive e-mail notification when new information is available in [address] 9. Click Sign Up. You can now view and download portions of your medical record. 10. Click the Download Summary menu link to download a portable copy of your medical information. Additional Information    If you have questions, please call [phone]. Remember, videoNEXT is NOT to be used for urgent needs. For medical emergencies, dial 911.

## 2020-02-20 ENCOUNTER — HOSPITAL ENCOUNTER (OUTPATIENT)
Age: 70
Setting detail: OUTPATIENT SURGERY
Discharge: HOME | End: 2020-02-20
Attending: INTERNAL MEDICINE | Admitting: INTERNAL MEDICINE
Payer: MEDICARE

## 2020-02-20 ENCOUNTER — ANESTHESIA EVENT (OUTPATIENT)
Dept: ENDOSCOPY | Age: 70
End: 2020-02-20
Payer: MEDICARE

## 2020-02-20 ENCOUNTER — ANESTHESIA (OUTPATIENT)
Dept: ENDOSCOPY | Age: 70
End: 2020-02-20
Payer: MEDICARE

## 2020-02-20 VITALS
OXYGEN SATURATION: 98 % | WEIGHT: 233.19 LBS | SYSTOLIC BLOOD PRESSURE: 119 MMHG | RESPIRATION RATE: 17 BRPM | HEIGHT: 66 IN | HEART RATE: 76 BPM | DIASTOLIC BLOOD PRESSURE: 70 MMHG | BODY MASS INDEX: 37.48 KG/M2 | TEMPERATURE: 97.6 F

## 2020-02-20 PROCEDURE — 74011250636 HC RX REV CODE- 250/636: Performed by: NURSE ANESTHETIST, CERTIFIED REGISTERED

## 2020-02-20 PROCEDURE — 74011250637 HC RX REV CODE- 250/637: Performed by: INTERNAL MEDICINE

## 2020-02-20 PROCEDURE — 74011250636 HC RX REV CODE- 250/636: Performed by: INTERNAL MEDICINE

## 2020-02-20 PROCEDURE — 76060000031 HC ANESTHESIA FIRST 0.5 HR: Performed by: INTERNAL MEDICINE

## 2020-02-20 PROCEDURE — 76040000019: Performed by: INTERNAL MEDICINE

## 2020-02-20 RX ORDER — ATROPINE SULFATE 0.1 MG/ML
0.5 INJECTION INTRAVENOUS
Status: DISCONTINUED | OUTPATIENT
Start: 2020-02-20 | End: 2020-02-20 | Stop reason: HOSPADM

## 2020-02-20 RX ORDER — DEXTROMETHORPHAN/PSEUDOEPHED 2.5-7.5/.8
1.2 DROPS ORAL
Status: DISCONTINUED | OUTPATIENT
Start: 2020-02-20 | End: 2020-02-20 | Stop reason: HOSPADM

## 2020-02-20 RX ORDER — SODIUM CHLORIDE 0.9 % (FLUSH) 0.9 %
5-40 SYRINGE (ML) INJECTION EVERY 8 HOURS
Status: DISCONTINUED | OUTPATIENT
Start: 2020-02-20 | End: 2020-02-20 | Stop reason: HOSPADM

## 2020-02-20 RX ORDER — FENTANYL CITRATE 50 UG/ML
25 INJECTION, SOLUTION INTRAMUSCULAR; INTRAVENOUS
Status: DISCONTINUED | OUTPATIENT
Start: 2020-02-20 | End: 2020-02-20 | Stop reason: HOSPADM

## 2020-02-20 RX ORDER — SODIUM CHLORIDE 0.9 % (FLUSH) 0.9 %
5-40 SYRINGE (ML) INJECTION AS NEEDED
Status: DISCONTINUED | OUTPATIENT
Start: 2020-02-20 | End: 2020-02-20 | Stop reason: HOSPADM

## 2020-02-20 RX ORDER — PROPOFOL 10 MG/ML
INJECTION, EMULSION INTRAVENOUS AS NEEDED
Status: DISCONTINUED | OUTPATIENT
Start: 2020-02-20 | End: 2020-02-20 | Stop reason: HOSPADM

## 2020-02-20 RX ORDER — MIDAZOLAM HYDROCHLORIDE 1 MG/ML
.25-5 INJECTION, SOLUTION INTRAMUSCULAR; INTRAVENOUS
Status: DISCONTINUED | OUTPATIENT
Start: 2020-02-20 | End: 2020-02-20 | Stop reason: HOSPADM

## 2020-02-20 RX ORDER — SODIUM CHLORIDE 9 MG/ML
75 INJECTION, SOLUTION INTRAVENOUS CONTINUOUS
Status: DISCONTINUED | OUTPATIENT
Start: 2020-02-20 | End: 2020-02-20 | Stop reason: HOSPADM

## 2020-02-20 RX ORDER — FLUMAZENIL 0.1 MG/ML
0.2 INJECTION INTRAVENOUS
Status: DISCONTINUED | OUTPATIENT
Start: 2020-02-20 | End: 2020-02-20 | Stop reason: HOSPADM

## 2020-02-20 RX ORDER — EPINEPHRINE 0.1 MG/ML
1 INJECTION INTRACARDIAC; INTRAVENOUS
Status: DISCONTINUED | OUTPATIENT
Start: 2020-02-20 | End: 2020-02-20 | Stop reason: HOSPADM

## 2020-02-20 RX ORDER — NALOXONE HYDROCHLORIDE 0.4 MG/ML
0.4 INJECTION, SOLUTION INTRAMUSCULAR; INTRAVENOUS; SUBCUTANEOUS
Status: DISCONTINUED | OUTPATIENT
Start: 2020-02-20 | End: 2020-02-20 | Stop reason: HOSPADM

## 2020-02-20 RX ADMIN — PROPOFOL 20 MG: 10 INJECTION, EMULSION INTRAVENOUS at 09:23

## 2020-02-20 RX ADMIN — PROPOFOL 30 MG: 10 INJECTION, EMULSION INTRAVENOUS at 09:20

## 2020-02-20 RX ADMIN — Medication 80 MG: at 09:26

## 2020-02-20 RX ADMIN — SODIUM CHLORIDE: 900 INJECTION, SOLUTION INTRAVENOUS at 08:55

## 2020-02-20 RX ADMIN — PROPOFOL 50 MG: 10 INJECTION, EMULSION INTRAVENOUS at 09:25

## 2020-02-20 RX ADMIN — PROPOFOL 100 MG: 10 INJECTION, EMULSION INTRAVENOUS at 09:18

## 2020-02-20 RX ADMIN — PROPOFOL 40 MG: 10 INJECTION, EMULSION INTRAVENOUS at 09:28

## 2020-02-20 NOTE — ROUTINE PROCESS
Laura Art 1950 
[id number] Situation: 
Verbal report received from: ERWIN Procedure: Procedure(s): 
COLONOSCOPY Background: 
 
Preoperative diagnosis: SCREENING Postoperative diagnosis: diverticulosis, hemorrhoids :  Dr. Turcios Pragnes Assistant(s): Endoscopy Technician-1: Minh Martin Endoscopy RN-1: Tony Quiroz RN Specimens: * No specimens in log * H. Pylori  no Assessment: 
Intra-procedure medications Anesthesia gave intra-procedure sedation and medications, see anesthesia flow sheet yes Intravenous fluids: NS@ Evangelist Bulla Vital signs stable Abdominal assessment: round and soft Recommendation: 
Discharge patient per MD order. Return to floor Family or Friend Permission to share finding with family or friend yes

## 2020-02-20 NOTE — PROCEDURES
NAME:  Cherelle Espinosa   :   1950   MRN:   [id number]     Date/Time:  2020 9:36 AM    Colonoscopy Operative Report    Procedure Type:   Colonoscopy --screening     Indications:     Screening colonoscopy  Pre-operative Diagnosis: see indication above  Post-operative Diagnosis:  See findings below  :  Jasper Tello MD  Referring Provider: Donaldo Means MD    Exam:  Airway: clear, no airway problems anticipated  Heart: RRR, without gallops or rubs  Lungs: clear bilaterally without wheezes, crackles, or rhonchi  Abdomen: soft, nontender, nondistended, bowel sounds present  Mental Status: awake, alert and oriented to person, place and time    Sedation:  MAC anesthesia Propofol 240mg IV  Procedure Details:  After informed consent was obtained with all risks and benefits of procedure explained and preoperative exam completed, the patient was taken to the endoscopy suite and placed in the left lateral decubitus position. Upon sequential sedation as per above, a digital rectal exam was performed demonstrating internal hemorrhoids. The Olympus videocolonoscope  was inserted in the rectum and carefully advanced to the cecum, which was identified by the ileocecal valve and appendiceal orifice. The distal terminal ileum was evaluated. The quality of preparation was adequate. The colonoscope was slowly withdrawn with careful evaluation between folds. Retroflexion in the rectum was completed demonstrating internal hemorrhoids. Findings:     -Normal terminal ileum  -Rare sigmoid colon diverticulosis with small-mouthed opening  -Small grade 1 internal hemorrhoids  -No masses, polyps, or inflammation noted    Specimen Removed:  None. Complications: None. EBL:  None.     Impression:    -Normal terminal ileum  -Rare sigmoid colon diverticulosis with small-mouthed opening  -Small grade 1 internal hemorrhoids  -No masses, polyps, or inflammation noted    Recommendations: --For colon cancer screening in this average-risk patient, colonoscopy may be repeated in 10 years. High fiber diet. Resume normal medication(s). Discharge Disposition:  Home in the company of a  when able to ambulate.     Carmina Oakes MD

## 2020-02-20 NOTE — ANESTHESIA PREPROCEDURE EVALUATION
Anesthetic History     PONV          Review of Systems / Medical History  Patient summary reviewed, nursing notes reviewed and pertinent labs reviewed    Pulmonary        Sleep apnea        Comments: ??  To have sleep study   Neuro/Psych   Within defined limits           Cardiovascular  Within defined limits                Exercise tolerance: >4 METS     GI/Hepatic/Renal     GERD           Endo/Other        Obesity     Other Findings   Comments: Screening            Physical Exam    Airway  Mallampati: III  TM Distance: 4 - 6 cm  Neck ROM: normal range of motion   Mouth opening: Normal     Cardiovascular  Regular rate and rhythm,  S1 and S2 normal,  no murmur, click, rub, or gallop  Rhythm: regular  Rate: normal         Dental  No notable dental hx       Pulmonary  Breath sounds clear to auscultation               Abdominal  GI exam deferred       Other Findings            Anesthetic Plan    ASA: 2  Anesthesia type: MAC and total IV anesthesia          Induction: Intravenous  Anesthetic plan and risks discussed with: Patient

## 2020-02-20 NOTE — PROGRESS NOTES
Endoscope was pre-cleaned at bedside immediately following procedure by Lisa CASTANO Anesthesia reports 240mg Propofol and 250mL NS given during procedure. Received report from anesthesia staff on vital signs and status of patient.

## 2020-02-20 NOTE — DISCHARGE INSTRUCTIONS
Sherren [facility]  [id number]  1950    COLON DISCHARGE INSTRUCTIONS  Discomfort:  Redness at IV site- apply warm compress to area; if redness or soreness persist- contact your physician  There may be a slight amount of blood passed from the rectum  Gaseous discomfort- walking, belching will help relieve any discomfort  You may not operate a vehicle for 12 hours  You may not engage in an occupation involving machinery or appliances for rest of today  You may not drink alcoholic beverages for at least 12 hours  Avoid making any critical decisions for at least 24 hour  DIET:   High fiber diet. - however -  remember your colon is empty and a heavy meal will produce gas. Avoid these foods:  vegetables, fried / greasy foods, carbonated drinks for today  MEDICATION:         ACTIVITY:  You may not resume your normal daily activities until tomorrow AM; it is recommended that you spend the remainder of the day resting -  avoid any strenuous activity. CALL M.D.   ANY SIGN OF:   Increasing pain, nausea, vomiting  Abdominal distension (swelling)  New increased bleeding (oral or rectal)  Fever (chills)  Pain in chest area  Bloody discharge from nose or mouth  Shortness of breath    IMPRESSION:  -Normal terminal ileum  -Rare sigmoid colon diverticulosis with small-mouthed opening  -Small grade 1 internal hemorrhoids  -No masses, polyps, or inflammation noted    Follow-up Instructions:   Call Dr. Soledad Bermeo if questions arise regarding your procedure  Telephone # [phone]  Repeat colonoscopy in 10 years    Roro Spain MD

## 2020-02-20 NOTE — ANESTHESIA POSTPROCEDURE EVALUATION
Procedure(s):  COLONOSCOPY.    MAC, total IV anesthesia    Anesthesia Post Evaluation        Patient location during evaluation: PACU  Note status: Adequate. Level of consciousness: responsive to verbal stimuli and sleepy but conscious  Pain management: satisfactory to patient  Airway patency: patent  Anesthetic complications: no  Cardiovascular status: acceptable  Respiratory status: acceptable  Hydration status: acceptable  Comments: +Post-Anesthesia Evaluation and Assessment    Patient: Huan Reilly MRN: [id number]  SSN: [id number]   YOB: 1950  Age: 81 y.o. Sex: female      Cardiovascular Function/Vital Signs    /70   Pulse 76   Temp 36.4 °C (97.6 °F)   Resp 17   Ht 5' 6\" (1.676 m)   Wt 105.8 kg (233 lb 3 oz)   SpO2 98%   Breastfeeding No   BMI 37.64 kg/m²     Patient is status post Procedure(s):  COLONOSCOPY. Nausea/Vomiting: Controlled. Postoperative hydration reviewed and adequate. Pain:  Pain Scale 1: Numeric (0 - 10) (02/20/20 0949)  Pain Intensity 1: 0 (02/20/20 0949)   Managed. Neurological Status: At baseline. Mental Status and Level of Consciousness: Arousable. Pulmonary Status:   O2 Device: Room air (02/20/20 0949)   Adequate oxygenation and airway patent. Complications related to anesthesia: None    Post-anesthesia assessment completed. No concerns. Signed By: Ale Brito MD    2/20/2020  Post anesthesia nausea and vomiting:  controlled      Vitals Value Taken Time   BP 0/0 2/20/2020 10:01 AM   Temp 36.4 °C (97.6 °F) 2/20/2020  9:42 AM   Pulse 0 2/20/2020 10:02 AM   Resp 0 2/20/2020 10:02 AM   SpO2 98 % 2/20/2020  9:52 AM   Vitals shown include unvalidated device data.

## 2020-02-20 NOTE — H&P
Gastroenterology Outpatient History and Physical    Patient: David Liu    Physician: Samira Bowling MD    Chief Complaint: CRC screening  History of Present Illness: 70yo F with need for CRC screening. NO GI s. Sx.  No fam hx CRC or polyps    History:  Past Medical History:   Diagnosis Date    Adenocarcinoma of breast ([facility] [address] [facility]     2017 LEFT     [facility]     [facility]    Cancer [facility]) [id number]     breast cancer right,left breast    GERD (gastroesophageal reflux disease)     Hypercholesterolemia     Nausea & vomiting     Radiation therapy complication 3892    left breast    Sleep apnea     CPAP      Past Surgical History:   Procedure Laterality Date    BREAST SURGERY PROCEDURE UNLISTED  1991    right mastectomy,lymph node biopsy    HX BREAST BIOPSY Left 2016    stereotatic    HX BREAST LUMPECTOMY Left 1/18/2017    LEFT BREAST LUMPECTOMY AND LEFT BREAST SENTINEL NODE BIOPSY WITH BLUE DYE performed by Dinorah House MD at [address] HX MASTECTOMY Right 1991    HX JACKLYN AND BSO        Social History     Socioeconomic History    Marital status:      Spouse name: Not on file    Number of children: Not on file    Years of education: Not on file    Highest education level: Not on file   Tobacco Use    Smoking status: Never Smoker    Smokeless tobacco: Never Used   Substance and Sexual Activity    Alcohol use: No    Drug use: Never    Sexual activity: Yes     Partners: Male     Birth control/protection: None      Family History   Problem Relation Age of Onset    Breast Cancer Mother     Dementia Mother     Diabetes Mother     Heart Disease Father     Stroke Father     Cancer Sister         pancreatic    Cancer Brother         lung    Diabetes Sister     Hypertension Sister       Patient Active Problem List   Diagnosis Code    Breast cancer, left ([facility] C50.912    Pure hypercholesterolemia E78.00    Vitamin D deficiency E55.9    Osteopenia of spine M85.88    Family history of diabetes mellitus Z83.3    Gastroesophageal reflux disease without esophagitis K21.9    Severe obesity (BMI 35.0-39. 9) with comorbidity ([facility] E66.01       Allergies: Allergies   Allergen Reactions    Other Medication Hives     holter monitor pads     Medications:   Prior to Admission medications    Medication Sig Start Date End Date Taking? Authorizing Provider   ergocalciferol (ERGOCALCIFEROL) 50,000 unit capsule Take 1 Cap by mouth every twenty-eight (28) days. 12/11/19  Yes Sandra Billings NP   letrozole [facility]) 2.5 mg tablet TAKE 1 TABLET BY MOUTH DAILY 12/11/19  Yes Sandra Billings NP   simvastatin (ZOCOR) 20 mg tablet Take 1 Tab by mouth nightly. 9/25/19  Yes Malia Garcia MD   cholecalciferol, vitamin D3, (VITAMIN D3) 2,000 unit tab Take one capsule daily 9/25/19  Yes Malia Garcia MD   ASPIR-LOW 81 mg tablet Takes irregularly,usually weekly 11/1/16  Yes Provider, Historical   multivitamin with iron (DAILY MULTI-VITAMINS/IRON) tablet Take 1 Tab by mouth daily. Yes Provider, Historical     Physical Exam:   Vital Signs: Blood pressure 141/88, pulse 95, temperature 98.1 °F (36.7 °C), resp. rate 16, height 5' 6\" (1.676 m), weight 105.8 kg (233 lb 3 oz), SpO2 99 %, not currently breastfeeding.   General: well developed, well nourished   HEENT: unremarkable   Heart: regular rhythm no mumur    Lungs: clear   Abdominal:  benign   Neurological: unremarkable   Extremities: no edema     Findings/Diagnosis: CRC screening  Plan of Care/Planned Procedure: Colonoscopy with conscious/deep sedation    Signed:  Roz Belcher MD 2/20/2020

## 2020-10-13 ENCOUNTER — HOSPITAL ENCOUNTER (OUTPATIENT)
Dept: MAMMOGRAPHY | Age: 70
Discharge: HOME | End: 2020-10-13
Attending: NURSE PRACTITIONER
Payer: MEDICARE

## 2020-10-13 DIAGNOSIS — Z90.11 H/O RIGHT MASTECTOMY: ICD-10-CM

## 2020-10-13 DIAGNOSIS — Z17.0 MALIGNANT NEOPLASM OF UPPER-OUTER QUADRANT OF LEFT BREAST IN FEMALE, ESTROGEN RECEPTOR POSITIVE (HCC): ICD-10-CM

## 2020-10-13 DIAGNOSIS — Z98.890 S/P LUMPECTOMY, LEFT BREAST: ICD-10-CM

## 2020-10-13 DIAGNOSIS — C50.412 MALIGNANT NEOPLASM OF UPPER-OUTER QUADRANT OF LEFT BREAST IN FEMALE, ESTROGEN RECEPTOR POSITIVE (HCC): ICD-10-CM

## 2020-10-13 PROCEDURE — 77061 BREAST TOMOSYNTHESIS UNI: CPT

## 2020-10-19 ENCOUNTER — OFFICE VISIT (OUTPATIENT)
Dept: INTERNAL MEDICINE CLINIC | Age: 70
End: 2020-10-19
Payer: MEDICARE

## 2020-10-19 ENCOUNTER — HOSPITAL ENCOUNTER (OUTPATIENT)
Dept: LAB | Age: 70
Discharge: HOME | End: 2020-10-19
Payer: MEDICARE

## 2020-10-19 VITALS
TEMPERATURE: 97.5 F | RESPIRATION RATE: 16 BRPM | WEIGHT: 242 LBS | HEART RATE: 77 BPM | HEIGHT: 66 IN | SYSTOLIC BLOOD PRESSURE: 119 MMHG | OXYGEN SATURATION: 100 % | BODY MASS INDEX: 38.89 KG/M2 | DIASTOLIC BLOOD PRESSURE: 79 MMHG

## 2020-10-19 DIAGNOSIS — R73.09 ELEVATED GLUCOSE: ICD-10-CM

## 2020-10-19 DIAGNOSIS — Z13.31 SCREENING FOR DEPRESSION: ICD-10-CM

## 2020-10-19 DIAGNOSIS — E66.01 SEVERE OBESITY (BMI 35.0-39.9) WITH COMORBIDITY (HCC): ICD-10-CM

## 2020-10-19 DIAGNOSIS — K21.9 GASTROESOPHAGEAL REFLUX DISEASE WITHOUT ESOPHAGITIS: ICD-10-CM

## 2020-10-19 DIAGNOSIS — R10.9 LEFT FLANK PAIN: ICD-10-CM

## 2020-10-19 DIAGNOSIS — C50.412 MALIGNANT NEOPLASM OF UPPER-OUTER QUADRANT OF LEFT BREAST IN FEMALE, ESTROGEN RECEPTOR POSITIVE (HCC): ICD-10-CM

## 2020-10-19 DIAGNOSIS — Z17.0 MALIGNANT NEOPLASM OF UPPER-OUTER QUADRANT OF LEFT BREAST IN FEMALE, ESTROGEN RECEPTOR POSITIVE (HCC): ICD-10-CM

## 2020-10-19 DIAGNOSIS — Z00.00 MEDICARE ANNUAL WELLNESS VISIT, SUBSEQUENT: ICD-10-CM

## 2020-10-19 DIAGNOSIS — R73.03 PREDIABETES: ICD-10-CM

## 2020-10-19 DIAGNOSIS — E78.00 PURE HYPERCHOLESTEROLEMIA: Primary | ICD-10-CM

## 2020-10-19 LAB
BILIRUB UR QL STRIP: NEGATIVE
GLUCOSE UR-MCNC: NEGATIVE MG/DL
KETONES P FAST UR STRIP-MCNC: NORMAL MG/DL
PH UR STRIP: 6.5 [PH] (ref 4.6–8)
PROT UR QL STRIP: NEGATIVE
SP GR UR STRIP: 1.02 (ref 1–1.03)
UA UROBILINOGEN AMB POC: NORMAL (ref 0.2–1)
URINALYSIS CLARITY POC: CLEAR
URINALYSIS COLOR POC: NORMAL
URINE BLOOD POC: NORMAL
URINE LEUKOCYTES POC: NORMAL
URINE NITRITES POC: NEGATIVE

## 2020-10-19 PROCEDURE — 36415 COLL VENOUS BLD VENIPUNCTURE: CPT

## 2020-10-19 PROCEDURE — 80053 COMPREHEN METABOLIC PANEL: CPT

## 2020-10-19 PROCEDURE — 3017F COLORECTAL CA SCREEN DOC REV: CPT | Performed by: FAMILY MEDICINE

## 2020-10-19 PROCEDURE — 1101F PT FALLS ASSESS-DOCD LE1/YR: CPT | Performed by: FAMILY MEDICINE

## 2020-10-19 PROCEDURE — 83036 HEMOGLOBIN GLYCOSYLATED A1C: CPT

## 2020-10-19 PROCEDURE — G0463 HOSPITAL OUTPT CLINIC VISIT: HCPCS | Performed by: FAMILY MEDICINE

## 2020-10-19 PROCEDURE — 85027 COMPLETE CBC AUTOMATED: CPT

## 2020-10-19 PROCEDURE — 80061 LIPID PANEL: CPT

## 2020-10-19 PROCEDURE — G8427 DOCREV CUR MEDS BY ELIG CLIN: HCPCS | Performed by: FAMILY MEDICINE

## 2020-10-19 PROCEDURE — G9899 SCRN MAM PERF RSLTS DOC: HCPCS | Performed by: FAMILY MEDICINE

## 2020-10-19 PROCEDURE — G0439 PPPS, SUBSEQ VISIT: HCPCS | Performed by: FAMILY MEDICINE

## 2020-10-19 PROCEDURE — 87086 URINE CULTURE/COLONY COUNT: CPT

## 2020-10-19 PROCEDURE — G8510 SCR DEP NEG, NO PLAN REQD: HCPCS | Performed by: FAMILY MEDICINE

## 2020-10-19 PROCEDURE — 1090F PRES/ABSN URINE INCON ASSESS: CPT | Performed by: FAMILY MEDICINE

## 2020-10-19 PROCEDURE — G8417 CALC BMI ABV UP PARAM F/U: HCPCS | Performed by: FAMILY MEDICINE

## 2020-10-19 PROCEDURE — 99214 OFFICE O/P EST MOD 30 MIN: CPT | Performed by: FAMILY MEDICINE

## 2020-10-19 PROCEDURE — G8399 PT W/DXA RESULTS DOCUMENT: HCPCS | Performed by: FAMILY MEDICINE

## 2020-10-19 PROCEDURE — G8536 NO DOC ELDER MAL SCRN: HCPCS | Performed by: FAMILY MEDICINE

## 2020-10-19 PROCEDURE — 81003 URINALYSIS AUTO W/O SCOPE: CPT | Performed by: FAMILY MEDICINE

## 2020-10-19 RX ORDER — SIMVASTATIN 20 MG/1
20 TABLET, FILM COATED ORAL
Qty: 90 TAB | Refills: 3 | Status: SHIPPED | OUTPATIENT
Start: 2020-10-19 | End: 2020-10-19 | Stop reason: SDUPTHER

## 2020-10-19 RX ORDER — SIMVASTATIN 20 MG/1
20 TABLET, FILM COATED ORAL
Qty: 90 TAB | Refills: 3 | Status: SHIPPED | OUTPATIENT
Start: 2020-10-19 | End: 2022-06-23 | Stop reason: SDUPTHER

## 2020-10-19 NOTE — PROGRESS NOTES
This is the Subsequent Medicare Annual Wellness Exam, performed 12 months or more after the Initial AWV or the last Subsequent AWV    I have reviewed the patient's medical history in detail and updated the computerized patient record. History     Patient Active Problem List   Diagnosis Code    Breast cancer, left ([facility] [facility] C50.912    Pure hypercholesterolemia E78.00    Vitamin D deficiency E55.9    Osteopenia of spine M85.88    Family history of diabetes mellitus Z83.3    Gastroesophageal reflux disease without esophagitis K21.9    Severe obesity (BMI 35.0-39. 9) with comorbidity ([facility] [facility] E66.01     Past Medical History:   Diagnosis Date    Adenocarcinoma of breast ([facility] [address] [facility]     2017 LEFT     Breast cancer ([facility] [address] [facility]     1991 RIGHT    Cancer [facility]) [id number]     breast cancer right,left breast    GERD (gastroesophageal reflux disease)     Hypercholesterolemia     Nausea & vomiting     Radiation therapy complication 7998    left breast    Sleep apnea     CPAP      Past Surgical History:   Procedure Laterality Date    BREAST SURGERY PROCEDURE UNLISTED  1991    right mastectomy,lymph node biopsy    COLONOSCOPY N/A 2/20/2020    COLONOSCOPY [facility] Red[facility]  2/20/2020         HX BREAST BIOPSY Left 2016    stereotatic    HX BREAST LUMPECTOMY Left 1/18/2017    LEFT BREAST LUMPECTOMY AND LEFT BREAST SENTINEL NODE BIOPSY WITH BLUE DYE performed by Claudetta Kluver, MD at [address] HX MASTECTOMY Right 1991    HX JACKLYN AND BSO       Current Outpatient Medications   Medication Sig Dispense Refill    ergocalciferol (ERGOCALCIFEROL) 50,000 unit capsule Take 1 Cap by mouth every twenty-eight (28) days. 3 Cap 3    letrozole (FEMARA) 2.5 mg tablet TAKE 1 TABLET BY MOUTH DAILY 90 Tab 3    simvastatin (ZOCOR) 20 mg tablet Take 1 Tab by mouth nightly.  90 Tab 3    cholecalciferol, vitamin D3, (VITAMIN D3) 2,000 unit tab Take one capsule daily 90 Tab 3    ASPIR-LOW 81 mg tablet Takes irregularly,usually weekly      multivitamin with iron (DAILY MULTI-VITAMINS/IRON) tablet Take 1 Tab by mouth daily. Allergies   Allergen Reactions    Other Medication Hives     holter monitor pads       Family History   Problem Relation Age of Onset    Breast Cancer Mother     Dementia Mother     Diabetes Mother     Heart Disease Father     Stroke Father     Cancer Sister         pancreatic    Cancer Brother         lung    Diabetes Sister     Hypertension Sister      Social History     Tobacco Use    Smoking status: Never Smoker    Smokeless tobacco: Never Used   Substance Use Topics    Alcohol use: No       Depression Risk Factor Screening:     3 most recent PHQ Screens 12/11/2019   Little interest or pleasure in doing things Not at all   Feeling down, depressed, irritable, or hopeless Not at all   Total Score PHQ 2 0       Alcohol Risk Screen   Do you average more than 1 drink per night or more than 7 drinks a week:  No    On any one occasion in the past three months have you have had more than 3 drinks containing alcohol:  No        Functional Ability and Level of Safety:   Hearing: Hearing is good. Activities of Daily Living: The home contains: no safety equipment. Patient does total self care     Ambulation: with no difficulty     Fall Risk:  Fall Risk Assessment, last 12 mths 12/11/2019   Able to walk? Yes   Fall in past 12 months?  No     Abuse Screen:  Patient is not abused       Cognitive Screening   Has your family/caregiver stated any concerns about your memory: no     Cognitive Screening: Normal - Verbal Fluency Test    Patient Care Team   Patient Care Team:  Srinivas Moon MD as PCP - General (Internal Medicine)  Srinivas Moon [facility] PCP [facility]  Yolanda Jacobo MD (Breast Surgery)  Marlee López MD as Physician (Hematology and Oncology)  Mercedes Hobson MD as Physician (Radiation Oncology)  Joey Zimmerman MD as Physician (Sleep Medicine)  Makayla Kumari NP as Nurse Practitioner (Oncology)  Valentine Thomas NP (Nurse Practitioner)  Valentine Thomas NP (Nurse Practitioner)  Valentine Thomas NP (Nurse Practitioner)    Assessment/Plan   Education and counseling provided:  Are appropriate based on today's review and evaluation    Diagnoses and all orders for this visit:    1. Screening for depression  -     DEPRESSION SCREEN ANNUAL    2.  Medicare annual wellness visit, subsequent        Health Maintenance Due   Topic Date Due    DTaP/Tdap/Td series (1 - Tdap) 06/22/1973    Shingrix Vaccine Age 50> (1 of 2) 06/22/2002    GLAUCOMA SCREENING Q2Y  06/22/2017    Flu Vaccine (1) 09/01/2020    Lipid Screen  09/25/2020

## 2020-10-19 NOTE — PROGRESS NOTES
Jennifer Roman is a 78 y.o. female who presents for follow up. Treated for HLP.  Trying to follow low fat diet.  On simvastatin, no myalgias. Not exercising.        Sees Dr. Jeffy Solano. History of breast cancer in 1996 on right with mastectomy, and second on left January 2017.  Left lumpectomy, XRT, Letrozole, no chemo. Has follow up appt. Mammogram Oct 13, 2020. She is active at home. Mows lawn. No sx with exertion. Left flank pain, no urinary symptoms. Better with stretching.       DEXA scan Feb 2019. Osteopenia in LS spine, started on Prolia by oncology, received one injection. On hold due to dental work.      Taking zantac for GERD, with relief. Bailey diet. Avoids caffeine.       Prediabetic. HbA1C 5.8% in Sept 2019,. Trying to follow diet.       JLELY with CPAP.      Colon screening, normal, Feb 2020, Dr Herbie Bower. normal BM.        Due for eye exam.          Past Medical History:   Diagnosis Date    Adenocarcinoma of breast ([address]     2017 LEFT     Breast cancer ([facility]     1991 RIGHT    Cancer [facility]) [id number]     breast cancer right,left breast    GERD (gastroesophageal reflux disease)     Hypercholesterolemia     Nausea & vomiting     Radiation therapy complication 9283    left breast    Sleep apnea     CPAP       Family History   Problem Relation Age of Onset    Breast Cancer Mother     Dementia Mother     Diabetes Mother     Heart Disease Father     Stroke Father     Cancer Sister         pancreatic    Cancer Brother         lung    Diabetes Sister     Hypertension Sister        Social History     Socioeconomic History    Marital status:      Spouse name: Not on file    Number of children: Not on file    Years of education: Not on file    Highest education level: Not on file   Occupational History    Not on file   Social Needs    Financial resource strain: Not on file    Food insecurity     Worry: Not on file     Inability: Not on file    Transportation needs     Medical: Not on file     Non-medical: Not on file   Tobacco Use    Smoking status: Never Smoker    Smokeless tobacco: Never Used   Substance and Sexual Activity    Alcohol use: No    Drug use: Never    Sexual activity: Yes     Partners: Male     Birth control/protection: None   Lifestyle    Physical activity     Days per week: Not on file     Minutes per session: Not on file    Stress: Not on file   Relationships    Social connections     Talks on phone: Not on file     Gets together: Not on file     Attends [employer] service: Not on file     Active member of club or organization: Not on file     Attends meetings of clubs or organizations: Not on file     Relationship status: Not on file    Intimate partner violence     Fear of current or ex partner: Not on file     Emotionally abused: Not on file     Physically abused: Not on file     Forced sexual activity: Not on file   Other Topics Concern    Not on file   Social History Narrative    Not on file       Current Outpatient Medications on File Prior to Visit   Medication Sig Dispense Refill    letrozole (FEMARA) 2.5 mg tablet TAKE 1 TABLET BY MOUTH DAILY 90 Tab 3    ASPIR-LOW 81 mg tablet Takes irregularly,usually weekly      multivitamin with iron (DAILY MULTI-VITAMINS/IRON) tablet Take 1 Tab by mouth daily.  ergocalciferol (ERGOCALCIFEROL) 50,000 unit capsule Take 1 Cap by mouth every twenty-eight (28) days. 3 Cap 3    cholecalciferol, vitamin D3, (VITAMIN D3) 2,000 unit tab Take one capsule daily 90 Tab 3    [DISCONTINUED] simvastatin (ZOCOR) 20 mg tablet Take 1 Tab by mouth nightly. 90 Tab 3     No current facility-administered medications on file prior to visit. Review of Systems  Pertinent items are noted in HPI.     Objective:     Visit Vitals  /79 (BP 1 Location: Left arm, BP Patient Position: Sitting)   Pulse 77   Temp 97.5 °F (36.4 °C) (Temporal)   Resp 16   Ht 5' 6\" (1.676 m)   Wt 242 lb (109.8 kg)   SpO2 100%   BMI 39.06 kg/m² Gen: well appearing female  HEENT:   PERRL,normal conjunctiva. External ear and canals normal, TMs no opacification or erythema,  OP no erythema, no exudates, MMM  Neck:  Supple. Thyroid normal size, nontender, without nodules. No masses or LAD  Resp:  No wheezing, no rhonchi, no rales. CV:  RRR, normal S1S2, no murmur. GI: soft, nontender, without masses. No hepatosplenomegaly. Extrem:  +2 pulses, no edema, warm distally      Assessment/Plan:       ICD-10-CM ICD-9-CM    1. Pure hypercholesterolemia  M41.31 818.3 METABOLIC PANEL, COMPREHENSIVE      LIPID PANEL      simvastatin (ZOCOR) 20 mg tablet      DISCONTINUED: simvastatin (ZOCOR) 20 mg tablet   2. Screening for depression  Z13.31 V79.0 DEPRESSION SCREEN ANNUAL   3. Medicare annual wellness visit, subsequent  Z00.00 V70.0    4. Severe obesity (BMI 35.0-39. 9) with comorbidity ([facility]  E66.01 278.01    5. Gastroesophageal reflux disease without esophagitis  K21.9 530.81    6. Malignant neoplasm of upper-outer quadrant of left breast in female, estrogen receptor positive (HCC)  C50.412 174.4 CBC W/O DIFF    Z17.0 V86.0    7. Prediabetes  R73.03 790.29 CBC W/O DIFF      HEMOGLOBIN A1C W/O EAG   8. Elevated glucose  R73.09 790.29 HEMOGLOBIN A1C W/O EAG   9. Left flank pain  R10.9 789.09 AMB POC URINALYSIS DIP STICK AUTO W/O MICRO      CULTURE, URINE     Recommend heart healthy diet and regular cardiovascular exercise.      Follow-up and Dispositions    · Return for follow up pending labs and annual.  .         Jerome Blanco MD

## 2020-10-19 NOTE — PATIENT INSTRUCTIONS
Medicare Wellness Visit, Female The best way to live healthy is to have a lifestyle where you eat a well-balanced diet, exercise regularly, limit alcohol use, and quit all forms of tobacco/nicotine, if applicable. Regular preventive services are another way to keep healthy. Preventive services (vaccines, screening tests, monitoring & exams) can help personalize your care plan, which helps you manage your own care. Screening tests can find health problems at the earliest stages, when they are easiest to treat. [address] follows the current, evidence-based guidelines published by the [facility] ([facility]) when recommending preventive services for our patients. Because we follow these guidelines, sometimes recommendations change over time as research supports it. (For example, mammograms used to be recommended annually. Even though Medicare will still pay for an annual mammogram, the newer guidelines recommend a mammogram every two years for women of average risk.) Of course, you and your doctor may decide to screen more often for some diseases, based on your risk and your health status. Preventive services for you include: - Medicare offers their members a free annual wellness visit, which is time for you and your primary care provider to discuss and plan for your preventive service needs. Take advantage of this benefit every year! 
-All adults over the age of 72 should receive the recommended pneumonia vaccines. Current USPSTF guidelines recommend a series of two vaccines for the best pneumonia protection.  
-All adults should have a flu vaccine yearly and a tetanus vaccine every 10 years. All adults age 48 and older should receive a shingles vaccine once in their lifetime.   
-A bone mass density test is recommended when a woman turns 65 to screen for osteoporosis. This test is only recommended one time, as a screening. Some providers will use this same test as a disease monitoring tool if you already have osteoporosis. -All adults age 38-68 who are overweight should have a diabetes screening test once every three years.  
-Other screening tests and preventive services for persons with diabetes include: an eye exam to screen for diabetic retinopathy, a kidney function test, a foot exam, and stricter control over your cholesterol.  
-Cardiovascular screening for adults with routine risk involves an electrocardiogram (ECG) at intervals determined by your doctor.  
-Colorectal cancer screenings should be done for adults age 54-65 with no increased risk factors for colorectal cancer. There are a number of acceptable methods of screening for this type of cancer. Each test has its own benefits and drawbacks. Discuss with your doctor what is most appropriate for you during your annual wellness visit. The different tests include: colonoscopy (considered the best screening method), a fecal occult blood test, a fecal DNA test, and sigmoidoscopy. -Breast cancer screenings are recommended every other year for women of normal risk, age 54-69. 
-Cervical cancer screenings for women over age 72 are only recommended with certain risk factors.  
-All adults born between [facility] should be screened once for Hepatitis C. Here is a list of your current Health Maintenance items (your personalized list of preventive services) with a due date: 
Health Maintenance Due Topic Date Due  
 DTaP/Tdap/Td  (1 - Tdap) 06/22/1973  Shingles Vaccine (1 of 2) 06/22/2002  Glaucoma Screening   06/22/2017  Yearly Flu Vaccine (1) 09/01/2020  Cholesterol Test   09/25/2020 Medicare Wellness Visit, Female The best way to live healthy is to have a lifestyle where you eat a well-balanced diet, exercise regularly, limit alcohol use, and quit all forms of tobacco/nicotine, if applicable. Regular preventive services are another way to keep healthy. Preventive services (vaccines, screening tests, monitoring & exams) can help personalize your care plan, which helps you manage your own care. Screening tests can find health problems at the earliest stages, when they are easiest to treat. [facility] follows the current, evidence-based guidelines published by the [facility] ([facility]) when recommending preventive services for our patients. Because we follow these guidelines, sometimes recommendations change over time as research supports it. (For example, mammograms used to be recommended annually. Even though Medicare will still pay for an annual mammogram, the newer guidelines recommend a mammogram every two years for women of average risk). Of course, you and your doctor may decide to screen more often for some diseases, based on your risk and your co-morbidities (chronic disease you are already diagnosed with). Preventive services for you include: - Medicare offers their members a free annual wellness visit, which is time for you and your primary care provider to discuss and plan for your preventive service needs. Take advantage of this benefit every year! 
-All adults over the age of 72 should receive the recommended pneumonia vaccines. Current USPSTF guidelines recommend a series of two vaccines for the best pneumonia protection.  
-All adults should have a flu vaccine yearly and a tetanus vaccine every 10 years.  
-All adults age 48 and older should receive the shingles vaccines (series of two vaccines).      
-All adults age 38-68 who are overweight should have a diabetes screening test once every three years.  
-All adults born between [birth year] and 1963 should be screened once for Hepatitis C. 
-Other screening tests and preventive services for persons with diabetes include: an eye exam to screen for diabetic retinopathy, a kidney function test, a foot exam, and stricter control over your cholesterol.  
-Cardiovascular screening for adults with routine risk involves an electrocardiogram (ECG) at intervals determined by your doctor.  
-Colorectal cancer screenings should be done for adults age 54-65 with no increased risk factors for colorectal cancer. There are a number of acceptable methods of screening for this type of cancer. Each test has its own benefits and drawbacks. Discuss with your doctor what is most appropriate for you during your annual wellness visit. The different tests include: colonoscopy (considered the best screening method), a fecal occult blood test, a fecal DNA test, and sigmoidoscopy. 
 
-A bone mass density test is recommended when a woman turns 65 to screen for osteoporosis. This test is only recommended one time, as a screening. Some providers will use this same test as a disease monitoring tool if you already have osteoporosis. -Breast cancer screenings are recommended every other year for women of normal risk, age 54-69. 
-Cervical cancer screenings for women over age 72 are only recommended with certain risk factors. OVER THE COUNTER PEPCID COMPLETE TWICE A DAY AS NEEDED FOR ACID REFLUX.

## 2020-10-20 LAB
ALBUMIN SERPL-MCNC: 4.6 G/DL (ref 3.8–4.8)
ALBUMIN/GLOB SERPL: 1.5 {RATIO} (ref 1.2–2.2)
ALP SERPL-CCNC: 100 IU/L (ref 39–117)
ALT SERPL-CCNC: 17 IU/L (ref 0–32)
AST SERPL-CCNC: 16 IU/L (ref 0–40)
BILIRUB SERPL-MCNC: 0.5 MG/DL (ref 0–1.2)
BUN SERPL-MCNC: 9 MG/DL (ref 8–27)
BUN/CREAT SERPL: 8 (ref 12–28)
CALCIUM SERPL-MCNC: 9.9 MG/DL (ref 8.7–10.3)
CHLORIDE SERPL-SCNC: 104 MMOL/L (ref 96–106)
CHOLEST SERPL-MCNC: 172 MG/DL (ref 100–199)
CO2 SERPL-SCNC: 23 MMOL/L (ref 20–29)
CREAT SERPL-MCNC: 1.16 MG/DL (ref 0.57–1)
ERYTHROCYTE [DISTWIDTH] IN BLOOD BY AUTOMATED COUNT: 14.6 % (ref 11.7–15.4)
GLOBULIN SER CALC-MCNC: 3.1 G/DL (ref 1.5–4.5)
GLUCOSE SERPL-MCNC: 87 MG/DL (ref 65–99)
HBA1C MFR BLD: 5.9 % (ref 4.8–5.6)
HCT VFR BLD AUTO: 42.3 % (ref 34–46.6)
HDLC SERPL-MCNC: 49 MG/DL
HGB BLD-MCNC: 14 G/DL (ref 11.1–15.9)
LDLC SERPL CALC-MCNC: 103 MG/DL (ref 0–99)
MCH RBC QN AUTO: 29.5 PG (ref 26.6–33)
MCHC RBC AUTO-ENTMCNC: 33.1 G/DL (ref 31.5–35.7)
MCV RBC AUTO: 89 FL (ref 79–97)
PLATELET # BLD AUTO: 220 X10E3/UL (ref 150–450)
POTASSIUM SERPL-SCNC: 4.4 MMOL/L (ref 3.5–5.2)
PROT SERPL-MCNC: 7.7 G/DL (ref 6–8.5)
RBC # BLD AUTO: 4.75 X10E6/UL (ref 3.77–5.28)
SODIUM SERPL-SCNC: 142 MMOL/L (ref 134–144)
TRIGL SERPL-MCNC: 112 MG/DL (ref 0–149)
VLDLC SERPL CALC-MCNC: 20 MG/DL (ref 5–40)
WBC # BLD AUTO: 4.3 X10E3/UL (ref 3.4–10.8)

## 2020-10-21 LAB — BACTERIA UR CULT: NORMAL

## 2020-10-26 ENCOUNTER — OFFICE VISIT (OUTPATIENT)
Dept: SURGERY | Age: 70
End: 2020-10-26
Payer: MEDICARE

## 2020-10-26 DIAGNOSIS — C50.412 MALIGNANT NEOPLASM OF UPPER-OUTER QUADRANT OF LEFT BREAST IN FEMALE, ESTROGEN RECEPTOR POSITIVE (HCC): Primary | ICD-10-CM

## 2020-10-26 DIAGNOSIS — Z98.890 S/P LUMPECTOMY, LEFT BREAST: ICD-10-CM

## 2020-10-26 DIAGNOSIS — Z17.0 MALIGNANT NEOPLASM OF UPPER-OUTER QUADRANT OF LEFT BREAST IN FEMALE, ESTROGEN RECEPTOR POSITIVE (HCC): Primary | ICD-10-CM

## 2020-10-26 DIAGNOSIS — Z90.11 H/O RIGHT MASTECTOMY: ICD-10-CM

## 2020-10-26 DIAGNOSIS — Z85.3 HISTORY OF BREAST CANCER IN FEMALE: ICD-10-CM

## 2020-10-26 DIAGNOSIS — Z92.3 S/P RADIATION THERAPY: ICD-10-CM

## 2020-10-26 PROCEDURE — G8536 NO DOC ELDER MAL SCRN: HCPCS | Performed by: NURSE PRACTITIONER

## 2020-10-26 PROCEDURE — G9899 SCRN MAM PERF RSLTS DOC: HCPCS | Performed by: NURSE PRACTITIONER

## 2020-10-26 PROCEDURE — G8399 PT W/DXA RESULTS DOCUMENT: HCPCS | Performed by: NURSE PRACTITIONER

## 2020-10-26 PROCEDURE — 1101F PT FALLS ASSESS-DOCD LE1/YR: CPT | Performed by: NURSE PRACTITIONER

## 2020-10-26 PROCEDURE — 1090F PRES/ABSN URINE INCON ASSESS: CPT | Performed by: NURSE PRACTITIONER

## 2020-10-26 PROCEDURE — G8432 DEP SCR NOT DOC, RNG: HCPCS | Performed by: NURSE PRACTITIONER

## 2020-10-26 PROCEDURE — G8427 DOCREV CUR MEDS BY ELIG CLIN: HCPCS | Performed by: NURSE PRACTITIONER

## 2020-10-26 PROCEDURE — 99213 OFFICE O/P EST LOW 20 MIN: CPT | Performed by: NURSE PRACTITIONER

## 2020-10-26 PROCEDURE — 3017F COLORECTAL CA SCREEN DOC REV: CPT | Performed by: NURSE PRACTITIONER

## 2020-10-26 PROCEDURE — G8417 CALC BMI ABV UP PARAM F/U: HCPCS | Performed by: NURSE PRACTITIONER

## 2020-10-26 NOTE — PROGRESS NOTES
HISTORY OF PRESENT ILLNESS  Coco Alatorre is a 78 y.o. female. HPI ESTABLISHED patient here for follow up LEFT breast cancer. Denies breast mass, skin changes, nipple discharge and pain. Continues Letrozole.     History of breast cancer-  - RIGHT mastectomy.  No chemo or radiation. 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 0/4 nodes, %. TX 80%, Her 2 negative  2016 LEFT lumpectomy, SLNB - Dr. Ronna Aguilar  17- 2ml old blood aspirated by Dr. Ronna Aguilar  17- debridement  17- clot debridement with sutures placed  17- completed radiation. Followed by Dr. Ayse Melendez. 17- started Letrozole. Followed by Dr. Luzma Rodriguez     Family history-  Mother had breast cancer in her 66's. Sister  from pancreatic cancer age 54. OB History    No obstetric history on file. Obstetric Comments   Menarche: 17. LMP:52  # of Children: 2. Age at Delivery of First Child:  24   Hysterectomy/oophorectomy:  Yes/one ovary removed. Breast Bx: yes   Hx of Breast Feeding:  no  BCP:  yes Hormone therapy: no.                Past Surgical History:   Procedure Laterality Date    BREAST SURGERY PROCEDURE UNLISTED      right mastectomy,lymph node biopsy    COLONOSCOPY N/A 2020    COLONOSCOPY performed by Karyle Amsterdam, MD at [facility]COPY    COLONOSCOPY,DIAGNOSTIC  2020         HX BREAST BIOPSY Left     stereotatic    HX BREAST LUMPECTOMY Left 2017    LEFT BREAST LUMPECTOMY AND LEFT BREAST SENTINEL NODE BIOPSY WITH BLUE DYE performed by Clarke Jasso MD at [facility] HX MASTECTOMY Right     HX JACKLYN AND BSO         [facility] Results (most recent):  Results from Hospital Encounter encounter on 10/13/20   [facility] 3D JASBIR W MAMMO LT DX INCL CAD    Narrative EXAM:  [facility] 3D JASBIR W MAMMO LT DX INCL CAD. INDICATION: History of left breast cancer status post lumpectomy . History  of right mastectomy .     COMPARISON: Prior mammograms 10/8/2019 and 7/15/2014. .    COMPOSITION: The breasts are heterogeneously dense, which may obscure small  masses. TECHNIQUE: Routine CC and MLO with spot medication craniocaudal and spot  magnification mediolateral views of the left breast were performed with digital  technique and interpreted with use of computer-aided detection. FINDINGS: There is no significant change in expected appearance of post lumbar  kidney site in the upper-outer posterior left breast. There is no mass,  suspicious calcification, distortion, skin thickening, or nipple retraction. No  significant change from the prior study. Impression IMPRESSION:  No mammographic evidence of malignancy. BI-RADS Assessment  Category 2: Benign finding. RECOMMENDATION: Diagnostic mammogram in one year in the absence of new or  suspicious clinical findings per postlumpectomy protocol. ROS    Physical Exam  Constitutional:       Appearance: Normal appearance. Chest:      Breasts:         Right: Absent. Left: No mass, nipple discharge, skin change or tenderness. Comments: LEFT breast - post treatment changes  RIGHT chest wall - s/p mastectomy without reconstruction  Musculoskeletal: Normal range of motion. Comments: UE x 2   Lymphadenopathy:      Upper Body:      Right upper body: No supraclavicular or axillary adenopathy. Left upper body: No supraclavicular or axillary adenopathy. Neurological:      Mental Status: She is alert. Psychiatric:         Attention and Perception: Attention normal.         Mood and Affect: Mood normal.         Speech: Speech normal.         Behavior: Behavior normal.         ASSESSMENT and PLAN  Encounter Diagnoses   Name Primary?     Malignant neoplasm of upper-outer quadrant of left breast in female, estrogen receptor positive ([address] Yes    S/P lumpectomy, left breast     H/O right mastectomy     S/P radiation therapy     History of breast cancer in female       Normal exam with no evidence of local recurrence. Reviewed normal post treatment changes. LDmammogram 3D in 10/2020. RTC in 1 year or sooner PRN. She is comfortable with this plan. All questions answered and she stated understanding.

## 2020-11-03 NOTE — PROGRESS NOTES
[address], [address] [place] [address] Kiko Mon[address], 200 [facility]  [phone]      Follow-up Note        Patient: Gissell Solitario MRN: [id number]  SSN: [id number]    YOB: 1950  Age: 78 y.o. Sex: female        Diagnosis:     1. 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 On Letrozole - started 5/1/2017  2. Completed radiation therapy on 4/28/2017  3. Lumpectomy on 1/18/2017    Subjective:      Gissell Solitario is a 78 y.o. female with a diagnosis of left sided breast carcinoma. She underwent a screening mammogram and was noted to have an abnormality in the left breast. Biopsy showed mucinous carcinoma which is ER and IL +ve. She was seen by Dr. Vishal Auguste and then underwent left breast lumpectomy on 01/18/2017. She finished radiation therapy on 4/28/2017. She started hormonal therapy with Letrozole and is tolerating it well without complaints. Prolia is on hold d/t on-going dental work. She denies any new symptoms.       Review of Systems:    Constitutional: negative  Eyes: negative  Ears, Nose, Mouth, Throat, and Face: negative  Respiratory: negative  Cardiovascular: negative  Gastrointestinal: negative  Genitourinary:negative  Integument/Breast: negative  Hematologic/Lymphatic: negative  Musculoskeletal:negative  Neurological: negative        Past Medical History:   Diagnosis Date    Adenocarcinoma of breast ([address]     2017 LEFT     Breast cancer ([address]     1991 RIGHT    Cancer [facility]) [id number]     breast cancer right,left breast    GERD (gastroesophageal reflux disease)     Hypercholesterolemia     Nausea & vomiting     Radiation therapy complication 0222    left breast    Sleep apnea     CPAP     Past Surgical History:   Procedure Laterality Date    BREAST SURGERY PROCEDURE UNLISTED  1991    right mastectomy,lymph node biopsy    COLONOSCOPY N/A 2/20/2020 COL[facility]  2/20/2020         HX BREAST BIOPSY Left 2016    stereotatic    HX BREAST LUMPECTOMY Left 1/18/2017    LEFT BREAST LUMPECTOMY AND LEFT BREAST SENTINEL NODE BIOPSY WITH BLUE DYE performed by Lynne Hernandez MD at [address] HX MASTECTOMY Right 12    HX JACKLYN AND BSO        Family History   Problem Relation Age of Onset    Breast Cancer Mother     Dementia Mother     Diabetes Mother     Heart Disease Father     Stroke Father     Cancer Sister         pancreatic    Cancer Brother         lung    Diabetes Sister     Hypertension Sister      Social History     Tobacco Use    Smoking status: Never Smoker    Smokeless tobacco: Never Used   Substance Use Topics    Alcohol use: No      Prior to Admission medications    Medication Sig Start Date End Date Taking? Authorizing Provider   simvastatin (ZOCOR) 20 mg tablet Take 1 Tab by mouth nightly. 10/19/20   Vernon Flynn MD   ergocalciferol (ERGOCALCIFEROL) 50,000 unit capsule Take 1 Cap by mouth every twenty-eight (28) days. 12/11/19   Shila Weinberg NP   letrozole [facility]) 2.5 mg tablet TAKE 1 TABLET BY MOUTH DAILY 12/11/19   Juventino LIZ NP   cholecalciferol, vitamin D3, (VITAMIN D3) 2,000 unit tab Take one capsule daily 9/25/19   Vernon Flynn MD   ASPIR-LOW 81 mg tablet Takes irregularly,usually weekly 11/1/16   Provider, Historical   multivitamin with iron (DAILY MULTI-VITAMINS/IRON) tablet Take 1 Tab by mouth daily. Provider, Historical              Allergies   Allergen Reactions    Other Medication Hives     holter monitor pads           Objective: There were no vitals taken for this visit. Pain Scale: /10  Pain Location:       Physical Exam:    GENERAL: alert, cooperative  EYE: negative  LYMPHATIC: Cervical, supraclavicular, and axillary nodes normal.   THROAT & NECK: normal and no erythema or exudates noted.    LUNG: clear to auscultation bilaterally  HEART: regular rate and rhythm  ABDOMEN: soft, non-tender  EXTREMITIES: no cyanosis or edema  SKIN: Normal.  NEUROLOGIC: negative        Assessment:     1. Left breast carcinoma:  pT1b N0 M0 (Stage IA) mucinous carcinoma, Tumor size 1 cm, LN -ve, grade 2, %, NV 80%, Her 2 -ve  Dx: 12/9/2016    ECOG PS 0  Intent of treatment - curative    S/P left sided lumpectomy 01/18/2017  Completed radiation therapy on 4/28/2017    On Letrozole - started 5/1/2017  No side effects  Tolerating well     In remission    Mammogram (10/13/2020): normal      2. Osteopenia    DEXA (2/12/2017): osteopenic  Prolia on hold for on-going dental work      3. Vitamin D deficiency    > Continue Vitamin D 50,000 units monthly      Plan:       > Continue Letrozole  > Continue Vitamin D monthly  > Follow-up in 6 months    I saw the patient in conjunction with Fede Singer NP      Signed by: Chris Jordan NP                     November 3, 2020      CC. Concepcion Crow MD  CC.  Jazmyne Ruth MD

## 2020-11-03 NOTE — PROGRESS NOTES
Adalberto Marie is a 78 y.o. female here for one year follow up for:  Chief Complaint   Patient presents with    Breast Cancer     L    Medication Evaluation     Letrozole     1. Have you been to the ER, urgent care clinic since your last visit? Hospitalized since your last visit? 2. Have you seen or consulted any other health care providers outside of the [address] [place] [address] since your last visit? Include any pap smears or colon screening.

## 2020-11-04 ENCOUNTER — OFFICE VISIT (OUTPATIENT)
Dept: ONCOLOGY | Age: 70
End: 2020-11-04

## 2020-11-04 DIAGNOSIS — C50.412 MALIGNANT NEOPLASM OF UPPER-OUTER QUADRANT OF LEFT BREAST IN FEMALE, ESTROGEN RECEPTOR POSITIVE (HCC): Primary | ICD-10-CM

## 2020-11-04 DIAGNOSIS — Z17.0 MALIGNANT NEOPLASM OF UPPER-OUTER QUADRANT OF LEFT BREAST IN FEMALE, ESTROGEN RECEPTOR POSITIVE (HCC): Primary | ICD-10-CM

## 2020-12-21 ENCOUNTER — VIRTUAL VISIT (OUTPATIENT)
Dept: SLEEP MEDICINE | Age: 70
End: 2020-12-21
Payer: MEDICARE

## 2020-12-21 DIAGNOSIS — G47.33 OSA (OBSTRUCTIVE SLEEP APNEA): Primary | ICD-10-CM

## 2020-12-21 PROCEDURE — 99442 PR PHYS/QHP TELEPHONE EVALUATION 11-20 MIN: CPT | Performed by: NURSE PRACTITIONER

## 2020-12-21 NOTE — PATIENT INSTRUCTIONS
217 [facility] [address]68 [facility], [address] Tel.  [phone] Fax. 100 [facility] [address], [address] [facility] Tel.  [phone] Fax. [phone] [address] Tel.  [phone] Fax. [phone] Learning About CPAP for Sleep Apnea What is CPAP? CPAP is a small machine that you use at home every night while you sleep. It increases air pressure in your throat to keep your airway open. When you have sleep apnea, this can help you sleep better so you feel much better. CPAP stands for \"continuous positive airway pressure. \" The CPAP machine will have one of the following: · A mask that covers your nose and mouth · Prongs that fit into your nose · A mask that covers your nose only, the most common type. This type is called NCPAP. The N stands for \"nasal.\" Why is it done? CPAP is usually the best treatment for obstructive sleep apnea. It is the first treatment choice and the most widely used. Your doctor may suggest CPAP if you have: · Moderate to severe sleep apnea. · Sleep apnea and coronary artery disease (CAD) or heart failure. How does it help? · CPAP can help you have more normal sleep, so you feel less sleepy and more alert during the daytime. · CPAP may help keep heart failure or other heart problems from getting worse. · NCPAP may help lower your blood pressure. · If you use CPAP, your bed partner may also sleep better because you are not snoring or restless. What are the side effects? Some people who use CPAP have: · A dry or stuffy nose and a sore throat. · Irritated skin on the face. · Sore eyes. · Bloating. If you have any of these problems, work with your doctor to fix them. Here are some things you can try: · Be sure the mask or nasal prongs fit well. · See if your doctor can adjust the pressure of your CPAP. · If your nose is dry, try a humidifier. · If your nose is runny or stuffy, try decongestant medicine or a steroid nasal spray. If these things do not help, you might try a different type of machine. Some machines have air pressure that adjusts on its own. Others have air pressures that are different when you breathe in than when you breathe out. This may reduce discomfort caused by too much pressure in your nose. Where can you learn more? Go to [employer].be Enter G346 in the search box to learn more about \"Learning About CPAP for Sleep Apnea. \"  
© 4497-3781 Healthwise, Incorporated. Care instructions adapted under license by [employer] (which disclaims liability or warranty for this information). This care instruction is for use with your licensed healthcare professional. If you have questions about a medical condition or this instruction, always ask your healthcare professional. [address] any warranty or liability for your use of this information. Content Version: 4.9.90316; Last Revised: January 11, 2010 PROPER SLEEP HYGIENE What to avoid · Do not have drinks with caffeine, such as coffee or black tea, for 8 hours before bed. · Do not smoke or use other types of tobacco near bedtime. Nicotine is a stimulant and can keep you awake. · Avoid drinking alcohol late in the evening, because it can cause you to wake in the middle of the night. · Do not eat a big meal close to bedtime. If you are hungry, eat a light snack. · Do not drink a lot of water close to bedtime, because the need to urinate may wake you up during the night. · Do not read or watch TV in bed. Use the bed only for sleeping and sexual activity. What to try · Go to bed at the same time every night, and wake up at the same time every morning. Do not take naps during the day. · Keep your bedroom quiet, dark, and cool. · Get regular exercise, but not within 3 to 4 hours of your bedtime. Nai Medrano · Sleep on a comfortable pillow and mattress. · If watching the clock makes you anxious, turn it facing away from you so you cannot see the time. · If you worry when you lie down, start a worry book. Well before bedtime, write down your worries, and then set the book and your concerns aside. · Try meditation or other relaxation techniques before you go to bed. · If you cannot fall asleep, get up and go to another room until you feel sleepy. Do something relaxing. Repeat your bedtime routine before you go to bed again. · Make your house quiet and calm about an hour before bedtime. Turn down the lights, turn off the TV, log off the computer, and turn down the volume on music. This can help you relax after a busy day. Drowsy Driving: The [place] [address] cites drowsiness as a causing factor in more than 140,472 police reported crashes annually, resulting in 76,000 injuries and 1,500 deaths. Other surveys suggest 55% of people polled have driven while drowsy in the past year, 23% had fallen asleep but not crashed, 3% crashed, and 2% had and accident due to drowsy driving. Who is at risk? Young Drivers: One study of drowsy driving accidents states that 55% of the drivers were under 25 years. Of those, 75% were male. Shift Workers and Travelers: People who work overnight or travel across time zones frequently are at higher risk of experiencing Circadian Rhythm Disorders. They are trying to work and function when their body is programed to sleep. Sleep Deprived: Lack of sleep has a serious impact on your ability to pay attention or focus on a task. Consistently getting less than the average of 8 hours your body needs creates partial or cumulative sleep deprivation. Untreated Sleep Disorders: Sleep Apnea, Narcolepsy, R.L.S., and other sleep disorders (untreated) prevent a person from getting enough restful sleep. This leads to excessive daytime sleepiness and increases the risk for drowsy driving accidents by up to 7 times. Medications / Alcohol: Even over the counter medications can cause drowsiness. Medications that impair a drivers attention should have a warning label. Alcohol naturally makes you sleepy and on its own can cause accidents. Combined with excessive drowsiness its effects are amplified. Signs of Drowsy Driving: * You don't remember driving the last few miles * You may drift out of your angelito * You are unable to focus and your thoughts wander * You may yawn more often than normal 
 * You have difficulty keeping your eyes open / nodding off * Missing traffic signs, speeding, or tailgating Prevention-  
Good sleep hygiene, lifestyle and behavioral choices have the most impact on drowsy driving. There is no substitute for sleep and the average person requires 8 hours nightly. If you find yourself driving drowsy, stop and sleep. Consider the sleep hygiene tips provided during your visit as well. Medication Refill Policy: Refills for all medications require 1 week advance notice. Please have your pharmacy fax a refill request. We are unable to fax, or call in \"controled substance\" medications and you will need to pick these prescriptions up from our office. [employer] Activation Thank you for requesting access to [employer]. Please follow the instructions below to securely access and download your online medical record. [employer] allows you to send messages to your doctor, view your test results, renew your prescriptions, schedule appointments, and more. How Do I Sign Up? 1. In your internet browser, go to [email or web address]. 2. Click on the First Time User? Click Here link in the Sign In box. You will see the New Member Sign Up page. 3. Enter your [email or web address] Access Code exactly as it appears below. You will not need to use this code after youve completed the sign-up process. If you do not sign up before the expiration date, you must request a new code. MyChart Access Code: Activation code not generated Current EveryMove Status: Active (This is the date your MyChart access code will ) 4. Enter the last four digits of your Social Security Number (xxxx) and Date of Birth (mm/dd/yyyy) as indicated and click Submit. You will be taken to the next sign-up page. 5. Create a [email or web address] ID. This will be your EveryMove login ID and cannot be changed, so think of one that is secure and easy to remember. 6. Create a EveryMove password. You can change your password at any time. 7. Enter your Password Reset Question and Answer. This can be used at a later time if you forget your password. 8. Enter your e-mail address. You will receive e-mail notification when new information is available in [address] 9. Click Sign Up. You can now view and download portions of your medical record. 10. Click the Download Summary menu link to download a portable copy of your medical information. Additional Information If you have questions, please call [phone]. Remember, EveryMove is NOT to be used for urgent needs. For medical emergencies, dial 911.

## 2020-12-21 NOTE — PROGRESS NOTES
[address] [place], [address]   Tel.  [phone]   Fax. [address] [facility] 60   [address] [facility] [address], [address] [facility]   Tel.  [phone]   Fax. [phone] [address] [facility] [address] Maldonado   Tel.  [phone]   Fax. [phone]       Subjective:     Monik Yo, who was evaluated through a patient-initiated, synchronous (real-time) audio only encounter, and/or her healthcare decision maker, is aware that it is a billable service, with coverage as determined by her insurance carrier. She provided verbal consent to proceed: Yes. She has not had a related appointment within my department in the past 7 days or scheduled within the next 24 hours. Total Time: minutes: 11-20 minutes    I was at home while conducting this encounter. Patient verified with photo in chart. Monik Yo is a 78 y.o. female, evaluated via audio-only technology on 12/21/2020 for a positive airway pressure follow-up, last seen by me on 12/23/2019, previously seen by Dr. Mercedez Ding  on 6/13/2017, prior notes reviewed in detail. Home sleep test 1/2017 showed AHI of 30.4/hr with a lowest SaO2 of 74%. duration of SaO2 < 88% 31 min. She  is seen today for follow up on device set up 3/23/2017. She reports problems using the device. The following concerns identified:    Drowsiness no Problems exhaling no   Snoring no Forget to put on yes   Mask Comfortable yes Can't fall asleep no   Dry Mouth no Mask falls off no   Air Leaking yes Frequent awakenings no     She admits that her sleep has improved on PAP therapy using full face mask and heated tubing but that she often falls asleep before she puts on her mask. Review of device download indicated:  Auto pressure: 5-12 cmH2O; Peak Avg Pressure: 11.2 cmH2O;  Avg. Device Pressure <= 90 %: 11.7 cmH2O      Average % Night in Large Leak:  0.0  % Used Days >= 4 hours: 53.  Avg hours used:  5:09.     Therapy Apnea Index averaged over PAP use: 8.5 /hr which reflects improved sleep breathing condition. [place] Sleepiness Score: 6 and Modified F.O.S.Q. Score Total / 2: 17 which reflects improved sleep quality over therapy time. Prior ESS 7. Allergies   Allergen Reactions    Other Medication Hives     holter monitor pads       She has a current medication list which includes the following prescription(s): simvastatin, letrozole, cholecalciferol (vitamin d3), aspir-low, multivitamin with iron, and ergocalciferol. .      She  has a past medical history of Adenocarcinoma of breast ([facility] ([facility] Cancer ([facility] ([phone] ), GERD (gastroesophageal reflux disease), Hypercholesterolemia, Nausea & vomiting, Radiation therapy complication (1831), and Sleep apnea. Sleep Review of Systems: notable for Negative difficulty falling asleep; Positive awakenings at night; Negative early morning headaches; Negative memory problems; Negative concentration issues; Negative chest pain; Negative shortness of breath; Negative significant joint pain at night; Negative significant muscle pain at night; Negative rashes or itching; Negative heartburn; Negative significant mood issues; 3-4 afternoon naps per week    Objective:   Vitals reported by patient   Patient-Reported Vitals 12/21/2020   Patient-Reported Weight 236        Calculated BMI 38    Physical Exam not completed due to audio only visit. Assessment:       ICD-10-CM ICD-9-CM    1. JELLY (obstructive sleep apnea)  G47.33 327.23 AMB SUPPLY ORDER      AMB SUPPLY ORDER   2. Adult BMI 38.0-38.9 kg/sq m  Z68.38 V85.38        AHI = 30.4(1/2017). On APAP, Respironics :  5-12 cmH2O. She is not compliant with PAP therapy although when used PAP shows benefit to the patient and remains necessary for control of her sleep apnea. There is continued clinical benefit from the hours of use demonstrated by AHI reduced from 30.4/hr to 8.5/hr.         Plan:     Follow-up and Dispositions    · Return in about 1 year (around 12/21/2021). * Change APAP pressure to 9-12 cmH2O changed ramp to smart for 20 min, starting pressure of 6 cm H2O. Changes made by provider in care orcherstrator system and sent to [facility] [place]. Orders Placed This Encounter    AMB SUPPLY ORDER     Diagnosis: (G47.33) JELLY (obstructive sleep apnea)  (primary encounter diagnosis)     Replacement Supplies for Positive Airway Pressure Therapy Device:   Duration of need: 99 months.  Full Face Mask 1 every 3 months. Dreamwear full face    Full Face Mask Cushion 1 per month.  Headgear 1 every 6 months.  Tubing with heating element 1 every 3 months.  Filter(s) Disposable 2 per month.  Filter(s) Non-Disposable 1 every 6 months. .   [address] [facility] for Humidifier (Replace) 1 every 6 months. VIC [place]; NPI: [id number]    Electronically signed. Date:- 12/21/20    AMB SUPPLY ORDER     Diagnosis: (G47.33) JELLY (obstructive sleep apnea)  (primary encounter diagnosis)     Pressure change APAP to 9-12 cmH2O. Changes made in sleep lab. VIC [place]; NPI: [id number]    Electronically signed. Date:- 12/21/20     * Counseling was provided regarding the importance of regular PAP use with emphasis on ensuring sufficient total sleep time, proper sleep hygiene, and safe driving. * Re-enforced proper and regular cleaning for the device. * She was asked to contact our office for any problems regarding PAP therapy. 2. Recommended a dedicated weight loss program through appropriate diet and exercise regimen as significant weight reduction has been shown to reduce severity of obstructive sleep apnea. Patient's phone number [phone] (home)  and [phone] (cell) were reviewed and confirmed for accuracy. She gives permission for messages regarding results and appointments to be left at that number.     Pursuant to the emergency declaration under the [address] and the [employer] Act, [facility] waiver authority and the Coronavirus Preparedness and [employer] Act, this telephone encounter was conducted with patient's (and/or legal guardian's) consent, to reduce the risk of exposure to COVID-19 and provide necessary medical care. Services were provided through a telephone call to substitute for in-person encounter. VIC [place], [address] [facility]  Electronically signed.  12/21/20

## 2020-12-22 ENCOUNTER — DOCUMENTATION ONLY (OUTPATIENT)
Dept: SLEEP MEDICINE | Age: 70
End: 2020-12-22

## 2021-01-05 NOTE — PROGRESS NOTES
Saul Ulrich is a 78 y.o. female here for one year follow up for right breast cancer. She is on Letrozole. 1. Have you been to the ER, urgent care clinic since your last visit? Hospitalized since your last visit? no    2. Have you seen or consulted any other health care providers outside of the [address] [place] [address] since your last visit? Include any pap smears or colon screening. no    Pt states she has been doing well. No complaints.

## 2021-01-06 ENCOUNTER — OFFICE VISIT (OUTPATIENT)
Dept: ONCOLOGY | Age: 71
End: 2021-01-06
Payer: MEDICARE

## 2021-01-06 VITALS
HEART RATE: 98 BPM | TEMPERATURE: 98.7 F | OXYGEN SATURATION: 96 % | SYSTOLIC BLOOD PRESSURE: 137 MMHG | HEIGHT: 66 IN | WEIGHT: 248.4 LBS | DIASTOLIC BLOOD PRESSURE: 85 MMHG | BODY MASS INDEX: 39.92 KG/M2

## 2021-01-06 DIAGNOSIS — Z17.0 MALIGNANT NEOPLASM OF UPPER-OUTER QUADRANT OF LEFT BREAST IN FEMALE, ESTROGEN RECEPTOR POSITIVE (HCC): Primary | ICD-10-CM

## 2021-01-06 DIAGNOSIS — C50.412 MALIGNANT NEOPLASM OF UPPER-OUTER QUADRANT OF LEFT BREAST IN FEMALE, ESTROGEN RECEPTOR POSITIVE (HCC): Primary | ICD-10-CM

## 2021-01-06 PROCEDURE — 3017F COLORECTAL CA SCREEN DOC REV: CPT | Performed by: INTERNAL MEDICINE

## 2021-01-06 PROCEDURE — G8432 DEP SCR NOT DOC, RNG: HCPCS | Performed by: INTERNAL MEDICINE

## 2021-01-06 PROCEDURE — 1090F PRES/ABSN URINE INCON ASSESS: CPT | Performed by: INTERNAL MEDICINE

## 2021-01-06 PROCEDURE — 1101F PT FALLS ASSESS-DOCD LE1/YR: CPT | Performed by: INTERNAL MEDICINE

## 2021-01-06 PROCEDURE — G8399 PT W/DXA RESULTS DOCUMENT: HCPCS | Performed by: INTERNAL MEDICINE

## 2021-01-06 PROCEDURE — G8417 CALC BMI ABV UP PARAM F/U: HCPCS | Performed by: INTERNAL MEDICINE

## 2021-01-06 PROCEDURE — 99213 OFFICE O/P EST LOW 20 MIN: CPT | Performed by: INTERNAL MEDICINE

## 2021-01-06 PROCEDURE — G8427 DOCREV CUR MEDS BY ELIG CLIN: HCPCS | Performed by: INTERNAL MEDICINE

## 2021-01-06 PROCEDURE — G9899 SCRN MAM PERF RSLTS DOC: HCPCS | Performed by: INTERNAL MEDICINE

## 2021-01-06 PROCEDURE — G8536 NO DOC ELDER MAL SCRN: HCPCS | Performed by: INTERNAL MEDICINE

## 2021-01-06 PROCEDURE — G0463 HOSPITAL OUTPT CLINIC VISIT: HCPCS | Performed by: INTERNAL MEDICINE

## 2021-01-06 NOTE — LETTER
1/6/2021    Patient: Paul Hansen   YOB: 1950   Date of Visit: 1/6/2021     Tata Palacios MD  [address]  [address]  [facility]  Via In [employer]    Dear Tata Palacios MD,      Thank you for referring Ms. Paul Hansen to [address] [place] [address] for evaluation. My notes for this consultation are attached. If you have questions, please do not hesitate to call me. I look forward to following your patient along with you.       Sincerely,    Dali Fox MD

## 2021-01-06 NOTE — PROGRESS NOTES
[address], [address] [place] [address] Kiko [place], [address] [place] [address]  [phone]      Follow-up Note        Patient: Alma Braga MRN: [id number]  SSN: [id number]    YOB: 1950  Age: 78 y.o. Sex: female        Diagnosis:     1. 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 On Letrozole - started 5/1/2017  2. Completed radiation therapy on 4/28/2017  3. Lumpectomy on 1/18/2017    Subjective:      Alma Braga is a 78 y.o. female with a diagnosis of left sided breast carcinoma. She underwent a screening mammogram and was noted to have an abnormality in the left breast. Biopsy showed mucinous carcinoma which is ER and IN +ve. She was seen by Dr. Amy Saez and then underwent left breast lumpectomy on 01/18/2017. She finished radiation therapy on 4/28/2017. She started hormonal therapy with Letrozole and is tolerating it well without complaints. Prolia is on hold d/t on-going dental work. She feels well today with no new complaints.       Review of Systems:    Constitutional: negative  Eyes: negative  Ears, Nose, Mouth, Throat, and Face: negative  Respiratory: negative  Cardiovascular: negative  Gastrointestinal: negative  Genitourinary:negative  Integument/Breast: negative  Hematologic/Lymphatic: negative  Musculoskeletal:negative  Neurological: negative        Past Medical History:   Diagnosis Date    Adenocarcinoma of breast ([address]     2017 LEFT     Breast cancer ([address]     1991 RIGHT    Cancer [facility]) [phone]     breast cancer right,left breast    GERD (gastroesophageal reflux disease)     Hypercholesterolemia     Nausea & vomiting     Radiation therapy complication 8616    left breast    Sleep apnea     CPAP     Past Surgical History:   Procedure Laterality Date    BREAST SURGERY PROCEDURE UNLISTED  1991    right mastectomy,lymph node biopsy    COLONOSCOPY N/A 2020    COL[facility] at [facility]  2020         HX BREAST BIOPSY Left 2016    stereotatic    HX BREAST LUMPECTOMY Left 2017    LEFT BREAST LUMPECTOMY AND LEFT BREAST SENTINEL NODE BIOPSY WITH BLUE DYE performed by Carlos Hernandez MD at [address] HX MASTECTOMY Right 12    HX JACKLYN AND BSO        Family History   Problem Relation Age of Onset    Breast Cancer Mother     Dementia Mother     Diabetes Mother     Heart Disease Father     Stroke Father     Cancer Sister         pancreatic    Cancer Brother         lung    Diabetes Sister     Hypertension Sister      Social History     Tobacco Use    Smoking status: Never Smoker    Smokeless tobacco: Never Used   Substance Use Topics    Alcohol use: No      Prior to Admission medications    Medication Sig Start Date End Date Taking? Authorizing Provider   letrozole [facility]) 2.5 mg tablet TAKE 1 TABLET BY MOUTH DAILY 20  Yes Horacio Dunn MD   simvastatin (ZOCOR) 20 mg tablet Take 1 Tab by mouth nightly. 10/19/20  Yes Florencia Fothergill, MD   cholecalciferol, vitamin D3, (VITAMIN D3) 2,000 unit tab Take one capsule daily 19  Yes Florencia Fothergill, MD   ASPIR-LOW 81 mg tablet Takes irregularly,usually weekly 16  Yes Provider, Historical   multivitamin with iron (DAILY MULTI-VITAMINS/IRON) tablet Take 1 Tab by mouth daily. Yes Provider, Historical   ergocalciferol (ERGOCALCIFEROL) 50,000 unit capsule Take 1 Cap by mouth every twenty-eight (28) days.  19   KapRae xiao Cage, NP              Allergies   Allergen Reactions    Other Medication Hives     holter monitor pads           Objective:     Visit Vitals  /85 (BP 1 Location: Left arm, BP Patient Position: Sitting)   Pulse 98   Temp 98.7 °F (37.1 °C) (Temporal)   Ht 5' 6\" (1.676 m)   Wt 248 lb 6.4 oz (112.7 kg)   SpO2 96%   BMI 40.09 kg/m²       Pain Scale: 0 - No pain/10  Pain Location: Physical Exam:    GENERAL: alert, cooperative  EYE: negative  LYMPHATIC: Cervical, supraclavicular, and axillary nodes normal.   THROAT & NECK: normal and no erythema or exudates noted. LUNG: clear to auscultation bilaterally  HEART: regular rate and rhythm  ABDOMEN: soft, non-tender  EXTREMITIES: no cyanosis or edema  SKIN: Normal.  NEUROLOGIC: negative      Physical exam and ROS has been modified from a prior visit to make it relevant and current          Assessment:     1. Left breast carcinoma:  pT1b N0 M0 (Stage IA) mucinous carcinoma, Tumor size 1 cm, LN -ve, grade 2, %, [place] 80%, [place] 2 -ve  Dx: 12/9/2016    ECOG PS 0  Intent of treatment - curative    S/P left sided lumpectomy 01/18/2017  Completed radiation therapy on 4/28/2017    On Letrozole - started 5/1/2017  No side effects  Tolerating well     In remission    Mammogram (10/13/2020): normal      2. Osteopenia    DEXA (2/12/2017): osteopenic  Prolia on hold for on-going dental work      3. Vitamin D deficiency    > Continue Vitamin D 50,000 units monthly      Plan:       > Continue Letrozole  > Continue Vitamin D monthly  > Follow-up in 6 months      I performed a history and physical examination of the patient and discussed his management with the NPP. I reviewed the NPP note and agree with the documented findings and plan of care. The patient was seen in conjunction with Ms. Mervat Gar. Signed by: Rupert Montelongo MD                     January 6, 2021        CC. Cristopher Adam MD  CC.  Candi Tang MD

## 2021-03-31 DIAGNOSIS — C50.412 MALIGNANT NEOPLASM OF UPPER-OUTER QUADRANT OF LEFT BREAST IN FEMALE, ESTROGEN RECEPTOR POSITIVE (HCC): ICD-10-CM

## 2021-03-31 DIAGNOSIS — Z17.0 MALIGNANT NEOPLASM OF UPPER-OUTER QUADRANT OF LEFT BREAST IN FEMALE, ESTROGEN RECEPTOR POSITIVE (HCC): ICD-10-CM

## 2021-04-01 RX ORDER — LETROZOLE 2.5 MG/1
TABLET, FILM COATED ORAL
Qty: 90 TAB | Refills: 0 | Status: SHIPPED | OUTPATIENT
Start: 2021-04-01 | End: 2021-07-01

## 2021-06-30 DIAGNOSIS — Z17.0 MALIGNANT NEOPLASM OF UPPER-OUTER QUADRANT OF LEFT BREAST IN FEMALE, ESTROGEN RECEPTOR POSITIVE (HCC): ICD-10-CM

## 2021-06-30 DIAGNOSIS — C50.412 MALIGNANT NEOPLASM OF UPPER-OUTER QUADRANT OF LEFT BREAST IN FEMALE, ESTROGEN RECEPTOR POSITIVE (HCC): ICD-10-CM

## 2021-07-01 RX ORDER — LETROZOLE 2.5 MG/1
TABLET, FILM COATED ORAL
Qty: 90 TABLET | Refills: 0 | Status: SHIPPED | OUTPATIENT
Start: 2021-07-01 | End: 2021-09-27

## 2021-07-08 NOTE — PROGRESS NOTES
Enoc Azul is a 73 y.o. female here for 6 month follow up for right breast cancer. She is on Letrozole. 1. Have you been to the ER, urgent care clinic since your last visit? Hospitalized since your last visit? no    2. Have you seen or consulted any other health care providers outside of the [address] [place] [address] since your last visit? Include any pap smears or colon screening. no    Pt states she has been doing well. Does have some aches and pains. Taking Letrozole every day.

## 2021-07-09 ENCOUNTER — OFFICE VISIT (OUTPATIENT)
Dept: ONCOLOGY | Age: 71
End: 2021-07-09
Payer: MEDICARE

## 2021-07-09 VITALS
OXYGEN SATURATION: 96 % | HEIGHT: 66 IN | HEART RATE: 84 BPM | BODY MASS INDEX: 40.6 KG/M2 | WEIGHT: 252.6 LBS | TEMPERATURE: 98.1 F | DIASTOLIC BLOOD PRESSURE: 79 MMHG | SYSTOLIC BLOOD PRESSURE: 147 MMHG

## 2021-07-09 DIAGNOSIS — C50.412 MALIGNANT NEOPLASM OF UPPER-OUTER QUADRANT OF LEFT BREAST IN FEMALE, ESTROGEN RECEPTOR POSITIVE (HCC): Primary | ICD-10-CM

## 2021-07-09 DIAGNOSIS — Z17.0 MALIGNANT NEOPLASM OF UPPER-OUTER QUADRANT OF LEFT BREAST IN FEMALE, ESTROGEN RECEPTOR POSITIVE (HCC): Primary | ICD-10-CM

## 2021-07-09 PROCEDURE — 99213 OFFICE O/P EST LOW 20 MIN: CPT | Performed by: INTERNAL MEDICINE

## 2021-07-09 PROCEDURE — G0463 HOSPITAL OUTPT CLINIC VISIT: HCPCS | Performed by: INTERNAL MEDICINE

## 2021-07-09 PROCEDURE — G8399 PT W/DXA RESULTS DOCUMENT: HCPCS | Performed by: INTERNAL MEDICINE

## 2021-07-09 PROCEDURE — G9899 SCRN MAM PERF RSLTS DOC: HCPCS | Performed by: INTERNAL MEDICINE

## 2021-07-09 PROCEDURE — G8536 NO DOC ELDER MAL SCRN: HCPCS | Performed by: INTERNAL MEDICINE

## 2021-07-09 PROCEDURE — 1101F PT FALLS ASSESS-DOCD LE1/YR: CPT | Performed by: INTERNAL MEDICINE

## 2021-07-09 PROCEDURE — 3017F COLORECTAL CA SCREEN DOC REV: CPT | Performed by: INTERNAL MEDICINE

## 2021-07-09 PROCEDURE — G8432 DEP SCR NOT DOC, RNG: HCPCS | Performed by: INTERNAL MEDICINE

## 2021-07-09 PROCEDURE — 1090F PRES/ABSN URINE INCON ASSESS: CPT | Performed by: INTERNAL MEDICINE

## 2021-07-09 PROCEDURE — G8417 CALC BMI ABV UP PARAM F/U: HCPCS | Performed by: INTERNAL MEDICINE

## 2021-07-09 PROCEDURE — G8427 DOCREV CUR MEDS BY ELIG CLIN: HCPCS | Performed by: INTERNAL MEDICINE

## 2021-07-09 NOTE — LETTER
7/9/2021    Patient: Gordon Goodpasture   YOB: 1950   Date of Visit: 7/9/2021     Reena Stoll MD  [address]  [address]  [facility]  V[address] [facility] [address]    Dear Reena Stoll MD,      Thank you for referring Ms. Gordon Goodpasture to [address] [place] [address] for evaluation. My notes for this consultation are attached. If you have questions, please do not hesitate to call me. I look forward to following your patient along with you.       Sincerely,    Ivone Jarvis MD

## 2021-07-09 NOTE — PROGRESS NOTES
[address] [facility] [address], [address] [facility], [address] [place] [address]  [phone]        Follow-up Note        Patient: Fco Downey MRN: [id number]  SSN: [id number]    YOB: 1950  Age: 73 y.o. Sex: female        Diagnosis:     1. 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 On Letrozole - started 5/1/2017  2. Completed radiation therapy on 4/28/2017  3. Lumpectomy on 1/18/2017    Subjective:      Fco Downey is a 73 y.o. female with a diagnosis of left sided breast carcinoma. She underwent a screening mammogram and was noted to have an abnormality in the left breast. Biopsy showed mucinous carcinoma which is ER and [place] +ve. She was seen by Dr. David Jerry and then underwent left breast lumpectomy on 01/18/2017. She finished radiation therapy on 4/28/2017. She started hormonal therapy with Letrozole and is tolerating it well without complaints. Her prolia was on hold d/t dental work. She does report minimal arthralgias. Review of Systems:    Constitutional: negative  Eyes: negative  Ears, Nose, Mouth, Throat, and Face: negative  Respiratory: negative  Cardiovascular: negative  Gastrointestinal: negative  Genitourinary:negative  Integument/Breast: negative  Hematologic/Lymphatic: negative  Musculoskeletal: mild arthralgias  Neurological: negative    ROS was pulled in from a previous visit. No changes were made d/t symptoms remain the same.       Past Medical History:   Diagnosis Date    Adenocarcinoma of breast ([address]     2017 LEFT     Breast cancer ([facility]     1991 RIGHT    Cancer [facility]) [phone]     breast cancer right,left breast    GERD (gastroesophageal reflux disease)     Hypercholesterolemia     Nausea & vomiting     Radiation therapy complication 9450    left breast    Sleep apnea     CPAP     Past Surgical History:   Procedure Laterality Date    COLONOSCOPY N/A 2/20/2020    COLON[facility]li [facility]  2/20/2020         HX BREAST BIOPSY Left 2016    stereotatic    HX BREAST LUMPECTOMY Left 1/18/2017    LEFT BREAST LUMPECTOMY AND LEFT BREAST SENTINEL NODE BIOPSY WITH BLUE DYE performed by Surekha Castellano MD at [place] [address] HX MASTECTOMY Right 1991    HX JACKLYN AND BSO      [address]    right mastectomy,lymph node biopsy      Family History   Problem Relation Age of Onset    Breast Cancer Mother     Dementia Mother     Diabetes Mother     Heart Disease Father     Stroke Father     Cancer Sister         pancreatic    Cancer Brother         lung    Diabetes Sister     Hypertension Sister      Social History     Tobacco Use    Smoking status: Never Smoker    Smokeless tobacco: Never Used   Substance Use Topics    Alcohol use: No      Prior to Admission medications    Medication Sig Start Date End Date Taking? Authorizing Provider   letrozole [facility]) 2.5 mg tablet TAKE 1 TABLET BY MOUTH DAILY 7/1/21   Christine Driver MD   simvastatin (ZOCOR) 20 mg tablet Take 1 Tab by mouth nightly. 10/19/20   Boris Henriquez MD   ergocalciferol (ERGOCALCIFEROL) 50,000 unit capsule Take 1 Cap by mouth every twenty-eight (28) days. 12/11/19   Emmanuelle Vogel NP   cholecalciferol, vitamin D3, (VITAMIN D3) 2,000 unit tab Take one capsule daily 9/25/19   Boris Henriquez MD   ASPIR-LOW 81 mg tablet Takes irregularly,usually weekly 11/1/16   Provider, Historical   multivitamin with iron (DAILY MULTI-VITAMINS/IRON) tablet Take 1 Tab by mouth daily.     Provider, Historical              Allergies   Allergen Reactions    Other Medication Hives     holter monitor pads           Objective:     Visit Vitals  BP (!) 147/79 (BP 1 Location: Left upper arm, BP Patient Position: Sitting)   Pulse 84   Temp 98.1 °F (36.7 °C) (Temporal)   Ht 5' 6\" (1.676 m)   Wt 252 lb 9.6 oz (114.6 kg)   SpO2 96%   BMI 40.77 kg/m²       Pain Scale: 0 - No pain/10  Pain Location:       Physical Exam:    GENERAL: alert, cooperative  EYE: negative  LYMPHATIC: Cervical, supraclavicular, and axillary nodes normal.   THROAT & NECK: normal and no erythema or exudates noted. LUNG: clear to auscultation bilaterally  HEART: regular rate and rhythm  ABDOMEN: soft, non-tender  EXTREMITIES: no cyanosis or edema  SKIN: Normal.  NEUROLOGIC: negative      Physical exam was pulled in from a previous visit. No changes were made d/t physical exam remains the same. Assessment:     1. Left breast carcinoma:  pT1b N0 M0 (Stage IA) mucinous carcinoma, Tumor size 1 cm, LN -ve, grade 2, %, [place] 80%, Her 2 -ve  Dx: 12/9/2016    ECOG PS 0  Intent of treatment - curative    S/P left sided lumpectomy 01/18/2017  Completed radiation therapy on 4/28/2017    On Letrozole - started 5/1/2017  No side effects  Tolerating well     In remission    Mammogram (10/13/2020): normal      2. Osteopenia    DEXA (2/12/2017): osteopenic  Prolia on hold for on-going dental work      3. Vitamin D deficiency    > Continue Vitamin D 50,000 units monthly      Plan:       > Continue Letrozole 2.5 mg daily -   > Continue Vitamin D monthly  > Follow-up in 1 yr    I performed a history and physical examination of the patient and discussed his management with the NPP. I reviewed the NPP note and agree with the documented findings and plan of care. The patient was seen in conjunction with Ms. Hair. Ms. Heather Carl a women with Stage I breast carcinoma. She is taking Letrozole. She is doing well. Her physical exam is normal.        Signed by: Christina Swain MD                     July 9, 2021        CC. Wing Machado MD  CC.  Melvi Fish MD

## 2021-09-03 ENCOUNTER — TELEPHONE (OUTPATIENT)
Dept: SLEEP MEDICINE | Age: 71
End: 2021-09-03

## 2021-09-03 NOTE — TELEPHONE ENCOUNTER
Patient called regarding [place] recall and recommendations. Discussed below with patient. - PAP setup on 3/23/2017 and not eligible under Medicare until after 3/23/2022.  - [place] device by [employer] at [phone]. Provide them with name, date of birth and serial number of device. - Positional therapy, weight management, use of slumber bump, head elevation during sleep are current recommendations. - Review recall information online and make a personal decision to continue/discontinue use based on benefits versus risks.  guidelines is to discontinue at this time. Patient agreed to above and expressed understanding.

## 2021-09-27 DIAGNOSIS — C50.412 MALIGNANT NEOPLASM OF UPPER-OUTER QUADRANT OF LEFT BREAST IN FEMALE, ESTROGEN RECEPTOR POSITIVE (HCC): ICD-10-CM

## 2021-09-27 DIAGNOSIS — Z17.0 MALIGNANT NEOPLASM OF UPPER-OUTER QUADRANT OF LEFT BREAST IN FEMALE, ESTROGEN RECEPTOR POSITIVE (HCC): ICD-10-CM

## 2021-09-27 RX ORDER — LETROZOLE 2.5 MG/1
TABLET, FILM COATED ORAL
Qty: 90 TABLET | Refills: 0 | Status: SHIPPED | OUTPATIENT
Start: 2021-09-27 | End: 2021-12-28

## 2021-10-14 ENCOUNTER — HOSPITAL ENCOUNTER (OUTPATIENT)
Dept: MAMMOGRAPHY | Age: 71
Discharge: HOME | End: 2021-10-14
Attending: NURSE PRACTITIONER
Payer: MEDICARE

## 2021-10-14 DIAGNOSIS — Z90.11 H/O RIGHT MASTECTOMY: ICD-10-CM

## 2021-10-14 DIAGNOSIS — Z85.3 HISTORY OF BREAST CANCER IN FEMALE: ICD-10-CM

## 2021-10-14 PROCEDURE — 77061 BREAST TOMOSYNTHESIS UNI: CPT

## 2021-11-01 ENCOUNTER — OFFICE VISIT (OUTPATIENT)
Dept: SURGERY | Age: 71
End: 2021-11-01
Payer: MEDICARE

## 2021-11-01 DIAGNOSIS — Z90.11 H/O RIGHT MASTECTOMY: ICD-10-CM

## 2021-11-01 DIAGNOSIS — Z98.890 S/P LUMPECTOMY, LEFT BREAST: ICD-10-CM

## 2021-11-01 DIAGNOSIS — C50.412 MALIGNANT NEOPLASM OF UPPER-OUTER QUADRANT OF LEFT BREAST IN FEMALE, ESTROGEN RECEPTOR POSITIVE (HCC): Primary | ICD-10-CM

## 2021-11-01 DIAGNOSIS — Z85.3 HISTORY OF BREAST CANCER IN FEMALE: ICD-10-CM

## 2021-11-01 DIAGNOSIS — Z17.0 MALIGNANT NEOPLASM OF UPPER-OUTER QUADRANT OF LEFT BREAST IN FEMALE, ESTROGEN RECEPTOR POSITIVE (HCC): Primary | ICD-10-CM

## 2021-11-01 PROCEDURE — G8432 DEP SCR NOT DOC, RNG: HCPCS | Performed by: NURSE PRACTITIONER

## 2021-11-01 PROCEDURE — G8399 PT W/DXA RESULTS DOCUMENT: HCPCS | Performed by: NURSE PRACTITIONER

## 2021-11-01 PROCEDURE — 3017F COLORECTAL CA SCREEN DOC REV: CPT | Performed by: NURSE PRACTITIONER

## 2021-11-01 PROCEDURE — 1101F PT FALLS ASSESS-DOCD LE1/YR: CPT | Performed by: NURSE PRACTITIONER

## 2021-11-01 PROCEDURE — G9899 SCRN MAM PERF RSLTS DOC: HCPCS | Performed by: NURSE PRACTITIONER

## 2021-11-01 PROCEDURE — 99213 OFFICE O/P EST LOW 20 MIN: CPT | Performed by: NURSE PRACTITIONER

## 2021-11-01 PROCEDURE — G8417 CALC BMI ABV UP PARAM F/U: HCPCS | Performed by: NURSE PRACTITIONER

## 2021-11-01 PROCEDURE — G8427 DOCREV CUR MEDS BY ELIG CLIN: HCPCS | Performed by: NURSE PRACTITIONER

## 2021-11-01 PROCEDURE — 1090F PRES/ABSN URINE INCON ASSESS: CPT | Performed by: NURSE PRACTITIONER

## 2021-11-01 PROCEDURE — G8536 NO DOC ELDER MAL SCRN: HCPCS | Performed by: NURSE PRACTITIONER

## 2021-11-01 NOTE — PROGRESS NOTES
HISTORY OF PRESENT ILLNESS  Santa Kay is a 73 y.o. female. HPI ESTABLISHED patient here for follow up LEFT breast cancer. Denies breast mass, skin changes, nipple discharge and pain.         History of breast cancer-  - RIGHT mastectomy.  No chemo or radiation. - LEFT breast 1 cm mucinous carcinoma. 0/4 nodes, %. WA 80%, Her 2 negative  2016 LEFT lumpectomy, SL[facility] - Dr. Delfina Osgood  17- 2ml old blood aspirated by Dr. Delfina Osgood  17- debridement  17- clot debridement with sutures placed  17- completed radiation. Followed by Dr. Chelsey Rea. 17- started Letrozole. Followed by Dr. Mirna Shelby     Family history-  Mother had breast cancer in her 66's. Sister  from pancreatic cancer age 54. OB History    No obstetric history on file. Obstetric Comments   Menarche: 17. LMP:52  # of Children: 2. Age at Delivery of First Child:  24   Hysterectomy/oophorectomy:  Yes/one ovary removed. Breast Bx: yes   Hx of Breast Feeding:  no  BCP:  yes Hormone therapy: no.                  Past Surgical History:   Procedure Laterality Date    COLONOSCOPY N/A 2020    COLONOSCOPY [facility] Zi Scales [facility] ENDOSCOPY    COLONOSCOPY,DIAGNOSTIC  2020         HX BREAST BIOPSY Left     stereotatic    HX BREAST LUMPECTOMY Left 2017    LEFT BREAST LUMPECTOMY AND LEFT BREAST SENTINEL NODE BIOPSY WITH BLUE DYE performed by Sol Pizano MD at [address] HX MASTECTOMY Right     HX OOPHORECTOMY      HX JACKLYN AND BSO      [facility]    right mastectomy,lymph node biopsy         YIMI Results (most recent):  Results from Hospital Encounter encounter on 10/14/21    YIMI 3D JASBIR W MAMMO LT DX INCL CAD    Narrative  STUDY:  Left Diagnostic Digital Mammography with tomosynthesis    INDICATION:  Left lumpectomy for carcinoma in .  History of right breast  mastectomy    COMPARISON:  [id number],     BREAST COMPOSITION: There are scattered fibroglandular densities. FINDINGS: Left digital diagnostic mammography with tomosynthesis was performed,  and is interpreted in conjunction with a computer assisted detection (CAD)  system. Lumpectomy changes are noted in the left breast upper outer quadrant. No new masses or suspicious calcifications are identified. Impression  1. BI-RADS Assessment Category 2: Benign finding. Left lumpectomy scar is  benign. Recommendation:  Continued annual mammographic surveillance. The patient has been notified of these results and recommendations. ROS    Physical Exam  Constitutional:       Appearance: Normal appearance. Chest:      Breasts:         Right: Absent. Left: No mass, nipple discharge, skin change or tenderness. Musculoskeletal:      Comments: FROM - UE x 2   Lymphadenopathy:      Upper Body:      Right upper body: No supraclavicular or axillary adenopathy. Left upper body: No supraclavicular or axillary adenopathy. Neurological:      Mental Status: She is alert. Psychiatric:         Attention and Perception: Attention normal.         Mood and Affect: Mood normal.         Speech: Speech normal.         Behavior: Behavior normal.           ASSESSMENT and PLAN  Encounter Diagnoses   Name Primary?  Malignant neoplasm of upper-outer quadrant of left breast in female, estrogen receptor positive ([facility] [address] Yes    H/O right mastectomy     S/P lumpectomy, left breast     History of breast cancer in female         Normal exam with no evidence of local recurrence. Reviewed normal post treatment changes. RX given for bras/prosthesis. LDmammogram 3D in 10/2022. RTC in 1 year or sooner PRN. She is comfortable with this plan. All questions answered and she stated understanding. Total time spent for this patient - 20 minutes.

## 2021-12-08 ENCOUNTER — TELEPHONE (OUTPATIENT)
Dept: SLEEP MEDICINE | Age: 71
End: 2021-12-08

## 2021-12-10 ENCOUNTER — TELEPHONE (OUTPATIENT)
Dept: SLEEP MEDICINE | Age: 71
End: 2021-12-10

## 2021-12-10 NOTE — TELEPHONE ENCOUNTER
Pt called stating that she has not been using her machine due to the recall, but her  has a resmed that she can use, but needs it to be adjusted to her settings. Please advise what needs to be done. all other ROS negative except as per HPI

## 2021-12-14 ENCOUNTER — OFFICE VISIT (OUTPATIENT)
Dept: SLEEP MEDICINE | Age: 71
End: 2021-12-14

## 2021-12-14 DIAGNOSIS — G47.33 OSA (OBSTRUCTIVE SLEEP APNEA): Primary | ICD-10-CM

## 2021-12-14 NOTE — PROGRESS NOTES
Soraida Jones (: 1950) last seen by me on 2020, previously seen by Dr. Aníbal Sandra 2017, prior notes reviewed in detail.  Home sleep test 2017 showed AHI of 30.4/hr with a lowest SaO2 of 74%. duration of SaO2 < 88% 31 min. AHI = 30.4(2017). On APAP, Respironics :  5-12 cmH2O. Set up 3/23/2017. Her device is affected by the [employer] recall, review of  Care  shows no new device set up at this time. Her device shows registered in the [employer] system. Patient has her husbands old Resmed S9 and would like to have that set for her use. She will bring device in to be set - unable to do APAP, set at CPAP 12 cm H2O. She will be seen 1/3/22 for follow up to assess therapy of this device. Orders Placed This Encounter    AMB SUPPLY ORDER     Diagnosis: (G47.33) JELLY (obstructive sleep apnea)  (primary encounter diagnosis)     ResMed S9 Elite     Pressure change CPAP to 12 cmH2O.  EPR 2, ramp starting at 6 cm H2O    Changes made in sleep lab. VIC Evans; NPI: [id number]    Electronically signed. Date:- 21    AMB SUPPLY ORDER     Diagnosis: (G47.33) JELLY (obstructive sleep apnea)  (primary encounter diagnosis)     CURRENT DEVICE RESMED S9 ELITE    Replacement Supplies for Positive Airway Pressure Therapy Device:   Duration of need: 99 months.  Full Face Mask 1 every 3 months.  Full Face Mask Cushion 1 per month.  Headgear 1 every 6 months.  Pos Airway pressure chin strap     Tubing with heating element 1 every 3 months. RESMED S9 ELITE    [id number] Filter(s) Disposable 2 per month. RESMED S9 ELITE  [id number] Filter(s) Non-Disposable 1 every 6 months. RESMED S9 ELITE  . [address] for [employer] (Replace) 1 every 6 months. RESMED S9 ELITE      VIC Evans; NPI: [id number]    Electronically signed.  Date:- 21     Ralph Klein NP, [facility]  21

## 2021-12-14 NOTE — PROGRESS NOTES
Set ResMed S9 Elite to the following settings:  CPAP 12cm H2O; EPR 2; starting pressure 6cm; Ramp 20 minutes; per Irlanda Choudhary NP order    Patient to come in before her January 3, 2022 appointment for a download to check AHI.

## 2021-12-27 DIAGNOSIS — Z17.0 MALIGNANT NEOPLASM OF UPPER-OUTER QUADRANT OF LEFT BREAST IN FEMALE, ESTROGEN RECEPTOR POSITIVE (HCC): ICD-10-CM

## 2021-12-27 DIAGNOSIS — C50.412 MALIGNANT NEOPLASM OF UPPER-OUTER QUADRANT OF LEFT BREAST IN FEMALE, ESTROGEN RECEPTOR POSITIVE (HCC): ICD-10-CM

## 2021-12-28 RX ORDER — LETROZOLE 2.5 MG/1
TABLET, FILM COATED ORAL
Qty: 90 TABLET | Refills: 0 | Status: SHIPPED | OUTPATIENT
Start: 2021-12-28 | End: 2022-03-29

## 2022-02-06 NOTE — PATIENT INSTRUCTIONS
Breast Cancer: Care Instructions  Your Care Instructions  Breast cancer occurs when abnormal cells grow out of control in the breast. These cancer cells can spread within the breast, to nearby lymph nodes and other tissues, and to other parts of the body. Being treated for cancer can weaken your body, and you may feel very tired. Get the rest your body needs so you can feel better. Finding out that you have cancer is scary. You may feel many emotions and may need some help coping. Seek out family, friends, and counselors for support. You also can do things at home to make yourself feel better while you go through treatment. Call the [employer]) (9-184.614.4449) or visit its website at [address] [employer] for more information. Follow-up care is a key part of your treatment and safety. Be sure to make and go to all appointments, and call your doctor if you are having problems. It's also a good idea to know your test results and keep a list of the medicines you take. How can you care for yourself at home? · Take your medicines exactly as prescribed. Call your doctor if you think you are having a problem with your medicine. You may get medicine for nausea and vomiting if you have these side effects. · Follow your doctor's instructions to relieve pain. Pain from cancer and surgery can almost always be controlled. Use pain medicine when you first notice pain, before it becomes severe. · Eat healthy food. If you do not feel like eating, try to eat food that has protein and extra calories to keep up your strength and prevent weight loss. Drink liquid meal replacements for extra calories and protein. Try to eat your main meal early. · Get some physical activity every day, but do not get too tired. Keep doing the hobbies you enjoy as your energy allows. · Do not smoke. Smoking can make your cancer worse. If you need help quitting, talk to your doctor about stop-smoking programs and medicines.  These can increase your chances of quitting for good. · Take steps to control your stress and workload. Learn relaxation techniques. ¨ Share your feelings. Stress and tension affect our emotions. By expressing your feelings to others, you may be able to understand and cope with them. ¨ Consider joining a support group. Talking about a problem with your spouse, a good friend, or other people with similar problems is a good way to reduce tension and stress. ¨ Express yourself through art. Try writing, crafts, dance, or art to relieve stress. Some dance, writing, or art groups may be available just for people who have cancer. ¨ Be kind to your body and mind. Getting enough sleep, eating a healthy diet, and taking time to do things you enjoy can contribute to an overall feeling of balance in your life and can help reduce stress. ¨ Get help if you need it. Discuss your concerns with your doctor or counselor. · If you are vomiting or have diarrhea:  ¨ Drink plenty of fluids (enough so that your urine is light yellow or clear like water) to prevent dehydration. Choose water and other caffeine-free clear liquids. If you have kidney, heart, or liver disease and have to limit fluids, talk with your doctor before you increase the amount of fluids you drink. ¨ When you are able to eat, try clear soups, mild foods, and liquids until all symptoms are gone for 12 to 48 hours. Other good choices include dry toast, crackers, cooked cereal, and gelatin dessert, such as Jell-O.  · If you have not already done so, prepare a list of advance directives. Advance directives are instructions to your doctor and family members about what kind of care you want if you become unable to speak or express yourself. When should you call for help? Call your doctor now or seek immediate medical care if:  · You have a fever. · Any part of your breast becomes red, tender, swollen, or hot.   · You have pain, redness, or swelling in the arm on the same side as your breast cancer. Watch closely for changes in your health, and be sure to contact your doctor if:  · You have pain that is not controlled by medicine. · You have nausea or vomiting. · You are constipated or have diarrhea. Where can you learn more? Go to http://janina-viviana.info/. Enter V321 in the search box to learn more about \"Breast Cancer: Care Instructions. \"  Current as of: July 26, 2016  Content Version: 11.1  © 2745-7547 [employer]. Care instructions adapted under license by [employer] (which disclaims liability or warranty for this information). If you have questions about a medical condition or this instruction, always ask your healthcare professional. [address] any warranty or liability for your use of this information. Breast Cancer: Care Instructions  Your Care Instructions  Breast cancer occurs when abnormal cells grow out of control in the breast. These cancer cells can spread within the breast, to nearby lymph nodes and other tissues, and to other parts of the body. Being treated for cancer can weaken your body, and you may feel very tired. Get the rest your body needs so you can feel better. Finding out that you have cancer is scary. You may feel many emotions and may need some help coping. Seek out family, friends, and counselors for support. You also can do things at home to make yourself feel better while you go through treatment. Call the [employer] (8-318.338.3733) or visit its website at [address] [employer] for more information. Follow-up care is a key part of your treatment and safety. Be sure to make and go to all appointments, and call your doctor if you are having problems. It's also a good idea to know your test results and keep a list of the medicines you take. How can you care for yourself at home? · Take your medicines exactly as prescribed.  Call your doctor if you think you are having a problem with your medicine. You may get medicine for nausea and vomiting if you have these side effects. · Follow your doctor's instructions to relieve pain. Pain from cancer and surgery can almost always be controlled. Use pain medicine when you first notice pain, before it becomes severe. · Eat healthy food. If you do not feel like eating, try to eat food that has protein and extra calories to keep up your strength and prevent weight loss. Drink liquid meal replacements for extra calories and protein. Try to eat your main meal early. · Get some physical activity every day, but do not get too tired. Keep doing the hobbies you enjoy as your energy allows. · Do not smoke. Smoking can make your cancer worse. If you need help quitting, talk to your doctor about stop-smoking programs and medicines. These can increase your chances of quitting for good. · Take steps to control your stress and workload. Learn relaxation techniques. ¨ Share your feelings. Stress and tension affect our emotions. By expressing your feelings to others, you may be able to understand and cope with them. ¨ Consider joining a support group. Talking about a problem with your spouse, a good friend, or other people with similar problems is a good way to reduce tension and stress. ¨ Express yourself through art. Try writing, crafts, dance, or art to relieve stress. Some dance, writing, or art groups may be available just for people who have cancer. ¨ Be kind to your body and mind. Getting enough sleep, eating a healthy diet, and taking time to do things you enjoy can contribute to an overall feeling of balance in your life and can help reduce stress. ¨ Get help if you need it. Discuss your concerns with your doctor or counselor. · If you are vomiting or have diarrhea:  ¨ Drink plenty of fluids (enough so that your urine is light yellow or clear like water) to prevent dehydration. Choose water and other caffeine-free clear liquids. If you have kidney, heart, or liver disease and have to limit fluids, talk with your doctor before you increase the amount of fluids you drink. ¨ When you are able to eat, try clear soups, mild foods, and liquids until all symptoms are gone for 12 to 48 hours. Other good choices include dry toast, crackers, cooked cereal, and gelatin dessert, such as Jell-O.  · If you have not already done so, prepare a list of advance directives. Advance directives are instructions to your doctor and family members about what kind of care you want if you become unable to speak or express yourself. When should you call for help? Call your doctor now or seek immediate medical care if:  · You have a fever. · Any part of your breast becomes red, tender, swollen, or hot. · You have pain, redness, or swelling in the arm on the same side as your breast cancer. Watch closely for changes in your health, and be sure to contact your doctor if:  · You have pain that is not controlled by medicine. · You have nausea or vomiting. · You are constipated or have diarrhea. Where can you learn more? Go to http://janina-viviana.info/. Enter V321 in the search box to learn more about \"Breast Cancer: Care Instructions. \"  Current as of: July 26, 2016  Content Version: 11.1  © 2828-8341 [employer]. Care instructions adapted under license by [employer] (which disclaims liability or warranty for this information). If you have questions about a medical condition or this instruction, always ask your healthcare professional. [place] [address] any warranty or liability for your use of this information. pod plan possible partial first and fifth ray resection pending MRI/vasc sneha henry 2/7  Podiatry plan for surgical intervention under local anesthesia and light IV sedation  No evidence of clinical HF or anginal symptoms  Recent TTE and NST 9/2021 with normal LV function and no ischemia  BP and DM controlled  Patient is presently medically optimized for proposed procedure  RCRI: 0 indicating 0.4% 30 day risk of death, MI or cardiac arrest.

## 2022-02-14 ENCOUNTER — TELEPHONE (OUTPATIENT)
Dept: SLEEP MEDICINE | Age: 72
End: 2022-02-14

## 2022-02-14 ENCOUNTER — VIRTUAL VISIT (OUTPATIENT)
Dept: SLEEP MEDICINE | Age: 72
End: 2022-02-14
Payer: MEDICARE

## 2022-02-14 DIAGNOSIS — G47.33 OSA (OBSTRUCTIVE SLEEP APNEA): Primary | ICD-10-CM

## 2022-02-14 PROCEDURE — 3017F COLORECTAL CA SCREEN DOC REV: CPT | Performed by: NURSE PRACTITIONER

## 2022-02-14 PROCEDURE — 1101F PT FALLS ASSESS-DOCD LE1/YR: CPT | Performed by: NURSE PRACTITIONER

## 2022-02-14 PROCEDURE — G8417 CALC BMI ABV UP PARAM F/U: HCPCS | Performed by: NURSE PRACTITIONER

## 2022-02-14 PROCEDURE — G9899 SCRN MAM PERF RSLTS DOC: HCPCS | Performed by: NURSE PRACTITIONER

## 2022-02-14 PROCEDURE — G8399 PT W/DXA RESULTS DOCUMENT: HCPCS | Performed by: NURSE PRACTITIONER

## 2022-02-14 PROCEDURE — G8432 DEP SCR NOT DOC, RNG: HCPCS | Performed by: NURSE PRACTITIONER

## 2022-02-14 PROCEDURE — 99213 OFFICE O/P EST LOW 20 MIN: CPT | Performed by: NURSE PRACTITIONER

## 2022-02-14 PROCEDURE — G8427 DOCREV CUR MEDS BY ELIG CLIN: HCPCS | Performed by: NURSE PRACTITIONER

## 2022-02-14 PROCEDURE — G8536 NO DOC ELDER MAL SCRN: HCPCS | Performed by: NURSE PRACTITIONER

## 2022-02-14 PROCEDURE — 1090F PRES/ABSN URINE INCON ASSESS: CPT | Performed by: NURSE PRACTITIONER

## 2022-02-14 NOTE — PATIENT INSTRUCTIONS
217 [facility] [place] [place], [address]  Tel.  [phone]  Fax. 100 [facility] 60  [place], [address] [facility]  Tel.  [phone]  Fax. [phone] [address]  Tel.  [phone]  Fax. [phone]     Learning About CPAP for Sleep Apnea  What is CPAP? CPAP is a small machine that you use at home every night while you sleep. It increases air pressure in your throat to keep your airway open. When you have sleep apnea, this can help you sleep better so you feel much better. CPAP stands for \"continuous positive airway pressure. \"  The CPAP machine will have one of the following:  · A mask that covers your nose and mouth  · Prongs that fit into your nose  · A mask that covers your nose only, the most common type. This type is called NCPAP. The N stands for \"nasal.\"  Why is it done? CPAP is usually the best treatment for obstructive sleep apnea. It is the first treatment choice and the most widely used. Your doctor may suggest CPAP if you have:  · Moderate to severe sleep apnea. · Sleep apnea and coronary artery disease (CAD) or heart failure. How does it help? · CPAP can help you have more normal sleep, so you feel less sleepy and more alert during the daytime. · CPAP may help keep heart failure or other heart problems from getting worse. · NCPAP may help lower your blood pressure. · If you use CPAP, your bed partner may also sleep better because you are not snoring or restless. What are the side effects? Some people who use CPAP have:  · A dry or stuffy nose and a sore throat. · Irritated skin on the face. · Sore eyes. · Bloating. If you have any of these problems, work with your doctor to fix them. Here are some things you can try:  · Be sure the mask or nasal prongs fit well. · See if your doctor can adjust the pressure of your CPAP. · If your nose is dry, try a humidifier.   · If your nose is runny or stuffy, try decongestant medicine or a steroid nasal spray. If these things do not help, you might try a different type of machine. Some machines have air pressure that adjusts on its own. Others have air pressures that are different when you breathe in than when you breathe out. This may reduce discomfort caused by too much pressure in your nose. Where can you learn more? Go to Front Flip.be  Enter Yobani Yi in the search box to learn more about \"Learning About CPAP for Sleep Apnea. \"   © 7441-3714 Healthwise, Incorporated. Care instructions adapted under license by [employer] (which disclaims liability or warranty for this information). This care instruction is for use with your licensed healthcare professional. If you have questions about a medical condition or this instruction, always ask your healthcare professional. [address] any warranty or liability for your use of this information. Content Version: 2.4.55841; Last Revised: January 11, 2010  PROPER SLEEP HYGIENE    What to avoid  · Do not have drinks with caffeine, such as coffee or black tea, for 8 hours before bed. · Do not smoke or use other types of tobacco near bedtime. Nicotine is a stimulant and can keep you awake. · Avoid drinking alcohol late in the evening, because it can cause you to wake in the middle of the night. · Do not eat a big meal close to bedtime. If you are hungry, eat a light snack. · Do not drink a lot of water close to bedtime, because the need to urinate may wake you up during the night. · Do not read or watch TV in bed. Use the bed only for sleeping and sexual activity. What to try  · Go to bed at the same time every night, and wake up at the same time every morning. Do not take naps during the day. · Keep your bedroom quiet, dark, and cool. · Get regular exercise, but not within 3 to 4 hours of your bedtime. .  · Sleep on a comfortable pillow and mattress.   · If watching the clock makes you anxious, turn it facing away from you so you cannot see the time. · If you worry when you lie down, start a worry book. Well before bedtime, write down your worries, and then set the book and your concerns aside. · Try meditation or other relaxation techniques before you go to bed. · If you cannot fall asleep, get up and go to another room until you feel sleepy. Do something relaxing. Repeat your bedtime routine before you go to bed again. · Make your house quiet and calm about an hour before bedtime. Turn down the lights, turn off the TV, log off the computer, and turn down the volume on music. This can help you relax after a busy day. Drowsy Driving: The [employer] cites drowsiness as a causing factor in more than 492,027 police reported crashes annually, resulting in 76,000 injuries and 1,500 deaths. Other surveys suggest 55% of people polled have driven while drowsy in the past year, 23% had fallen asleep but not crashed, 3% crashed, and 2% had and accident due to drowsy driving. Who is at risk? Young Drivers: One study of drowsy driving accidents states that 55% of the drivers were under 25 years. Of those, 75% were male. Shift Workers and Travelers: People who work overnight or travel across time zones frequently are at higher risk of experiencing Circadian Rhythm Disorders. They are trying to work and function when their body is programed to sleep. Sleep Deprived: Lack of sleep has a serious impact on your ability to pay attention or focus on a task. Consistently getting less than the average of 8 hours your body needs creates partial or cumulative sleep deprivation. Untreated Sleep Disorders: Sleep Apnea, Narcolepsy, R.L.S., and other sleep disorders (untreated) prevent a person from getting enough restful sleep. This leads to excessive daytime sleepiness and increases the risk for drowsy driving accidents by up to 7 times.   Medications / Alcohol: Even over the counter medications can cause drowsiness. Medications that impair a drivers attention should have a warning label. Alcohol naturally makes you sleepy and on its own can cause accidents. Combined with excessive drowsiness its effects are amplified. Signs of Drowsy Driving:   * You don't remember driving the last few miles   * You may drift out of your angelito   * You are unable to focus and your thoughts wander   * You may yawn more often than normal   * You have difficulty keeping your eyes open / nodding off   * Missing traffic signs, speeding, or tailgating  Prevention-   Good sleep hygiene, lifestyle and behavioral choices have the most impact on drowsy driving. There is no substitute for sleep and the average person requires 8 hours nightly. If you find yourself driving drowsy, stop and sleep. Consider the sleep hygiene tips provided during your visit as well. Medication Refill Policy: Refills for all medications require 1 week advance notice. Please have your pharmacy fax a refill request. We are unable to fax, or call in \"controled substance\" medications and you will need to pick these prescriptions up from our office. Route4Me Activation    Thank you for requesting access to Route[employer]Me. Please follow the instructions below to securely access and download your online medical record. Route4Me allows you to send messages to your doctor, view your test results, renew your prescriptions, schedule appointments, and more. How Do I Sign Up? 1. In your internet browser, go to [email or web address]. 2. Click on the First Time User? Click Here link in the Sign In box. You will see the New Member Sign Up page. 3. Enter your Route4Me Access Code exactly as it appears below. You will not need to use this code after youve completed the sign-up process. If you do not sign up before the expiration date, you must request a new code. Route4Me Access Code:  Activation code not generated  Current [employer] Status: Active (This is the date your [employer] access code will )    4. Enter the last four digits of your Social Security Number (xxxx) and Date of Birth (mm/dd/yyyy) as indicated and click Submit. You will be taken to the next sign-up page. 5. Create a ROXIMITYt ID. This will be your [employer] login ID and cannot be changed, so think of one that is secure and easy to remember. 6. Create a [employer] password. You can change your password at any time. 7. Enter your Password Reset Question and Answer. This can be used at a later time if you forget your password. 8. Enter your e-mail address. You will receive e-mail notification when new information is available in [address] 9. Click Sign Up. You can now view and download portions of your medical record. 10. Click the Download Summary menu link to download a portable copy of your medical information. Additional Information    If you have questions, please call [phone]. Remember, [employer] is NOT to be used for urgent needs. For medical emergencies, dial 911.

## 2022-02-14 NOTE — PROGRESS NOTES
Dayna Gonzalez (: 1950) is a 73 y.o. female, established patient, seen for positive airway pressure follow-up, she was last seen by me on 2020, previously seen by Dr. Compa Valentine 2017, prior notes reviewed in detail.  Home sleep test 2017 showed AHI of 30.4/hr with a lowest SaO2 of 74%. duration of SaO2 < 88% 31 min. ASSESSMENT/PLAN:    ICD-10-CM ICD-9-CM    1. JELLY (obstructive sleep apnea)  G47.33 327.23    2. Adult BMI 39.0-39.9 kg/sq m  Z68.39 V85.39        AHI = 30.4(2017). On APAP, Respironics :  9-12 cmH2O. Set up 3/23/2017. She is compliant with PAP therapy and PAP continues to benefit patient and remains necessary for control of her sleep apnea. She is using her husbands Resmed S9 nightly. Her device is affected by the [employer] recall, review of  Care  shows no new device set up at this time. She is eligible for new device if she would like next month. Follow-up and Dispositions    · Return in about 3 months (around 2022). 1. Sleep Apnea -  Continue on current pressures. We have discussed the [employer] device recall, Her device shows registered in the [employer] system. She will bring in resmed for download. * Tech to complete download and assess if AHI is managed, if nt contact Provider to adjust pressure settings prior to patient leaving with device - no remote access. * She was asked to contact our office for any problems regarding PAP therapy. 2. Recommended a dedicated weight loss program through appropriate diet and exercise regimen as significant weight reduction has been shown to reduce severity of obstructive sleep apnea. SUBJECTIVE/OBJECTIVE:    She  is seen today for follow up on PAP device and reports no problems using the resemd S9 device.    The following concerns identified:    Drowsiness no Problems exhaling no   Snoring no Forget to put on no   Mask Comfortable yes Can't fall asleep no   Dry Mouth no Mask falls off no   Air Leaking no Frequent awakenings no       She admits that her sleep has improved on PAP therapy using full face mask and heated tubing. She has been using her 's Resmed 9 and doing well - this is set as CPAP 12 cm H2O, she needs to bring in for download    Review of [employer] device download indicates last use in August 2021 at time of recall registration:  Auto pressure: 9-12 cmH2O; Peak Avg Pressure: 11.7 cmH2O;  Avg. Device Pressure <= 90 %: 10.5 cmH2O  95th Percentile Leak:  L/Min     Average % Night in Large Leak:  0.0    Therapy Apnea Index averaged over PAP use: 6.0 /hr which reflects improved sleep breathing condition. [place] Sleepiness Score: 5 and Modified F.O.S.Q. Score Total / 2: 17.5 which reflects improved sleep quality over therapy time. Sleep Review of Systems: notable for Negative difficulty falling asleep; Positive awakenings at night; Negative early morning headaches; Negative memory problems; Negative concentration issues; Negative chest pain; Negative shortness of breath; Negative significant joint pain at night; Negative rashes or itching; Negative heartburn; Negative significant mood issues; 3-4 afternoon naps per week    Vitals reported by patient     Patient-Reported Vitals 2/14/2022   Patient-Reported Weight 245.0   Patient-Reported Pulse (No Data)   Patient-Reported Temperature 98.8   Patient-Reported Systolic  (No Data)   Patient-Reported Diastolic (No Data)      Calculated BMI 39    Physical Exam completed by visual and auditory observation of patient with verbal input from patient.     General:   Alert, oriented, not in acute distress   Eyes:  Anicteric Sclerae; no obvious strabismus   Nose:  No obvious nasal septum deviation    Neck:   Midline trachea, no visible mass   Chest/Lungs:  Respiratory effort normal, no visualized signs of difficulty breathing or respiratory distress   CVS:  No JVD   Extremities:  No obvious rashes noted on face, neck, or hands   Neuro: No facial asymmetry, no focal deficits; no obvious tremor    Psych:  Normal affect,  normal countenance     Mayte Moreland, who was evaluated through a synchronous (real-time) audio-video encounter, and/or her healthcare decision maker, is aware that it is a billable service, which includes applicable co-pays, with coverage as determined by her insurance carrier. She provided verbal consent to proceed: Yes, and patient identification was verified. This visit was conducted pursuant to the emergency declaration under the [address] [place] [address] authority and the [employer] General Act. A caregiver was present when appropriate. Ability to conduct physical exam was limited. The patient was located in a state where the provider was licensed to provide care. An electronic signature was used to authenticate this note.     -- Carmelo Puentes NP, [facility]  02/14/22

## 2022-03-19 PROBLEM — K21.9 GASTROESOPHAGEAL REFLUX DISEASE WITHOUT ESOPHAGITIS: Status: ACTIVE | Noted: 2017-06-08

## 2022-03-19 PROBLEM — E66.01 SEVERE OBESITY (BMI 35.0-39.9) WITH COMORBIDITY (HCC): Status: ACTIVE | Noted: 2018-05-23

## 2022-03-19 PROBLEM — Z83.3 FAMILY HISTORY OF DIABETES MELLITUS: Status: ACTIVE | Noted: 2017-06-08

## 2022-03-20 PROBLEM — M85.88 OSTEOPENIA OF SPINE: Status: ACTIVE | Noted: 2017-06-08

## 2022-03-20 PROBLEM — E55.9 VITAMIN D DEFICIENCY: Status: ACTIVE | Noted: 2017-06-08

## 2022-03-20 PROBLEM — E78.00 PURE HYPERCHOLESTEROLEMIA: Status: ACTIVE | Noted: 2017-06-08

## 2022-03-29 DIAGNOSIS — Z17.0 MALIGNANT NEOPLASM OF UPPER-OUTER QUADRANT OF LEFT BREAST IN FEMALE, ESTROGEN RECEPTOR POSITIVE (HCC): ICD-10-CM

## 2022-03-29 DIAGNOSIS — C50.412 MALIGNANT NEOPLASM OF UPPER-OUTER QUADRANT OF LEFT BREAST IN FEMALE, ESTROGEN RECEPTOR POSITIVE (HCC): ICD-10-CM

## 2022-03-29 RX ORDER — LETROZOLE 2.5 MG/1
TABLET, FILM COATED ORAL
Qty: 90 TABLET | Refills: 0 | Status: SHIPPED | OUTPATIENT
Start: 2022-03-29 | End: 2022-06-28

## 2022-06-07 ENCOUNTER — DOCUMENTATION ONLY (OUTPATIENT)
Dept: SURGERY | Age: 72
End: 2022-06-07

## 2022-06-07 NOTE — PROGRESS NOTES
Received a request from Selena Callejas [email or web address] requesting medical records for the last 5 years.  Request has been faxed to Naren Foster gi [phone]

## 2022-06-15 ENCOUNTER — TELEPHONE (OUTPATIENT)
Dept: INTERNAL MEDICINE CLINIC | Age: 72
End: 2022-06-15

## 2022-06-15 NOTE — TELEPHONE ENCOUNTER
#[phone]  Pt states that  is positive for COVID. Pt has symptoms and wants to know what to do. She has temp 100.5, dry cough and weakness. Pt is waiting for her results from medical facility. Pt would like to know what to do. Should she get the medication now? Please call to advise.

## 2022-06-20 ENCOUNTER — VIRTUAL VISIT (OUTPATIENT)
Dept: SLEEP MEDICINE | Age: 72
End: 2022-06-20
Payer: MEDICARE

## 2022-06-20 DIAGNOSIS — G47.33 OSA (OBSTRUCTIVE SLEEP APNEA): Primary | ICD-10-CM

## 2022-06-20 PROCEDURE — G8417 CALC BMI ABV UP PARAM F/U: HCPCS | Performed by: NURSE PRACTITIONER

## 2022-06-20 PROCEDURE — G8536 NO DOC ELDER MAL SCRN: HCPCS | Performed by: NURSE PRACTITIONER

## 2022-06-20 PROCEDURE — G8427 DOCREV CUR MEDS BY ELIG CLIN: HCPCS | Performed by: NURSE PRACTITIONER

## 2022-06-20 PROCEDURE — 1123F ACP DISCUSS/DSCN MKR DOCD: CPT | Performed by: NURSE PRACTITIONER

## 2022-06-20 PROCEDURE — 3017F COLORECTAL CA SCREEN DOC REV: CPT | Performed by: NURSE PRACTITIONER

## 2022-06-20 PROCEDURE — G8399 PT W/DXA RESULTS DOCUMENT: HCPCS | Performed by: NURSE PRACTITIONER

## 2022-06-20 PROCEDURE — 1101F PT FALLS ASSESS-DOCD LE1/YR: CPT | Performed by: NURSE PRACTITIONER

## 2022-06-20 PROCEDURE — G9899 SCRN MAM PERF RSLTS DOC: HCPCS | Performed by: NURSE PRACTITIONER

## 2022-06-20 PROCEDURE — G8432 DEP SCR NOT DOC, RNG: HCPCS | Performed by: NURSE PRACTITIONER

## 2022-06-20 PROCEDURE — 1090F PRES/ABSN URINE INCON ASSESS: CPT | Performed by: NURSE PRACTITIONER

## 2022-06-20 PROCEDURE — 99213 OFFICE O/P EST LOW 20 MIN: CPT | Performed by: NURSE PRACTITIONER

## 2022-06-20 NOTE — PROGRESS NOTES
Thalia Lewis (: 1950) is a 73 y.o. female, established patient, seen for positive airway pressure follow-up, she was last seen by me on 2022, previously seen by Dr. Parveen Levine 2017, prior notes reviewed in detail.  Home sleep test 2017 showed AHI of 30.4/hr with a lowest SaO2 of 74%. duration of SaO2 < 88% 31 min. ASSESSMENT/PLAN:    ICD-10-CM ICD-9-CM    1. JELLY (obstructive sleep apnea)  G47.33 327.23    2. Adult BMI 39.0-39.9 kg/sq m  Z68.39 V85.39          AHI = 30.4(2017).   On ResMed CPAP :  12 cmH2O. Prior APAP, Respironics :  9-12 cmH2O. Set up 3/23/2017. Eligible for replacement, declines at this time. She is adherent with PAP therapy and PAP continues to benefit patient and remains necessary for control of her sleep apnea. Her device is affected by the [employer] recall, review of  Care  shows no new device set up at this time. She is using a donated resmed device (from ). Her device shows registered in the [employer] system. Current status of order [id number] - We have received your information, and we are working to get you a replacement as soon as possible. Your order was processed on 2022 but it has not shipped yet. Follow-up and Dispositions    · Return in about 6 months (around 2022) for 6 month follow up . 1. Sleep Apnea - Patient needs to bring Resmed device in for download. Tech to contact provider regarding patient desire for pressure increase. *  Patient notes that she does not need supplies at this time. * She has not gotten the replacement [employer] device yet, she will call [employer] and continue using her 's resmed device. .    * She was asked to contact our office for any problems regarding PAP therapy. 2. Encouraged continued weight management program through appropriate diet and exercise regimen as significant weight reduction has been shown to reduce severity of obstructive sleep apnea. SUBJECTIVE/OBJECTIVE:    She  is seen today for follow up on PAP device and reports no problems using the device. The following concerns reviewed:    Drowsiness no Problems exhaling no   Snoring no Forget to put on no   Mask Comfortable yes Can't fall asleep no   Dry Mouth no Mask falls off no   Air Leaking no Frequent awakenings no       She admits that her sleep has improved on PAP therapy using full face mask and heated tubing. She notes that she sometimes cannot feel the air pressure and would possibly like more air pressure. She feels she is sleeping well and not tired during the day. She is recovering from [place], tested positive last week. Review of device download not available. She will bring device in for download. [place] Sleepiness Score: 10 and Modified F.O.S.Q. Score Total / 2: 17 which reflects improved sleep quality over therapy time. Sleep Review of Systems: notable for Negative difficulty falling asleep; Positive awakenings at night; Negative early morning headaches; Negative memory problems; Negative concentration issues; Negative chest pain; Negative shortness of breath; Negative significant joint pain at night; Negative rashes or itching; Negative heartburn; Negative significant mood issues; 3-4 afternoon naps per week    Vitals reported by patient   Patient-Reported Vitals 6/20/2022   Patient-Reported Weight 245   Patient-Reported Pulse (No Data)   Patient-Reported Temperature 98.9   Patient-Reported Systolic  (No Data)   Patient-Reported Diastolic (No Data)      Calculated BMI 39    Physical Exam completed by visual and auditory observation of patient with verbal input from patient.     General:   Alert, oriented, not in acute distress   Eyes:  Anicteric Sclerae; no obvious strabismus   Nose:  No obvious nasal septum deviation    Neck:   Midline trachea, no visible mass   Chest/Lungs:  Respiratory effort normal, no visualized signs of difficulty breathing or respiratory distress   CVS:  No JVD   Extremities:  No obvious rashes noted on face, neck, or hands   Neuro:  No facial asymmetry, no focal deficits; no obvious tremor    Psych:  Normal affect,  normal countenance     Erica Chavez, was evaluated through a synchronous (real-time) audio-video encounter. The patient (or guardian if applicable) is aware that this is a billable service, which includes applicable co-pays. This Virtual Visit was conducted with patient's (and/or legal guardian's) consent. The visit was conducted pursuant to the emergency declaration under the [address] [place] [address], [address] [place] [address] authority and the [employer] and [employer] General Act. Patient identification was verified, and a caregiver was present when appropriate. The patient was located at: Home: [address]  The provider was located at: Facility (Appt Department): [id number]  [place] [id number]      Patient's phone number [phone] (home)  was confirmed for accuracy. She gives permission for messages regarding results and appointments to be left at that number. An electronic signature was used to authenticate this note.     -- Terence Martin NP, [facility]  06/20/22

## 2022-06-20 NOTE — PATIENT INSTRUCTIONS
217 [facility] [place] [place], [address]  Tel.  [phone]  Fax. 100 [facility] 60  [place], [address] [facility]  Tel.  [phone]  Fax. [phone] [address]  Tel.  [phone]  Fax. [phone]     Learning About CPAP for Sleep Apnea  What is CPAP? CPAP is a small machine that you use at home every night while you sleep. It increases air pressure in your throat to keep your airway open. When you have sleep apnea, this can help you sleep better so you feel much better. CPAP stands for \"continuous positive airway pressure. \"  The CPAP machine will have one of the following:  · A mask that covers your nose and mouth  · Prongs that fit into your nose  · A mask that covers your nose only, the most common type. This type is called NCPAP. The N stands for \"nasal.\"  Why is it done? CPAP is usually the best treatment for obstructive sleep apnea. It is the first treatment choice and the most widely used. Your doctor may suggest CPAP if you have:  · Moderate to severe sleep apnea. · Sleep apnea and coronary artery disease (CAD) or heart failure. How does it help? · CPAP can help you have more normal sleep, so you feel less sleepy and more alert during the daytime. · CPAP may help keep heart failure or other heart problems from getting worse. · NCPAP may help lower your blood pressure. · If you use CPAP, your bed partner may also sleep better because you are not snoring or restless. What are the side effects? Some people who use CPAP have:  · A dry or stuffy nose and a sore throat. · Irritated skin on the face. · Sore eyes. · Bloating. If you have any of these problems, work with your doctor to fix them. Here are some things you can try:  · Be sure the mask or nasal prongs fit well. · See if your doctor can adjust the pressure of your CPAP. · If your nose is dry, try a humidifier.   · If your nose is runny or stuffy, try decongestant medicine or a steroid nasal spray. If these things do not help, you might try a different type of machine. Some machines have air pressure that adjusts on its own. Others have air pressures that are different when you breathe in than when you breathe out. This may reduce discomfort caused by too much pressure in your nose. Where can you learn more? Go to TechDevils.be  Enter Marlee Enioraheem in the search box to learn more about \"Learning About CPAP for Sleep Apnea. \"   © 5783-9826 Healthwise, Incorporated. Care instructions adapted under license by [employer] (which disclaims liability or warranty for this information). This care instruction is for use with your licensed healthcare professional. If you have questions about a medical condition or this instruction, always ask your healthcare professional. [address] any warranty or liability for your use of this information. Content Version: 0.5.66014; Last Revised: January 11, 2010  PROPER SLEEP HYGIENE    What to avoid  · Do not have drinks with caffeine, such as coffee or black tea, for 8 hours before bed. · Do not smoke or use other types of tobacco near bedtime. Nicotine is a stimulant and can keep you awake. · Avoid drinking alcohol late in the evening, because it can cause you to wake in the middle of the night. · Do not eat a big meal close to bedtime. If you are hungry, eat a light snack. · Do not drink a lot of water close to bedtime, because the need to urinate may wake you up during the night. · Do not read or watch TV in bed. Use the bed only for sleeping and sexual activity. What to try  · Go to bed at the same time every night, and wake up at the same time every morning. Do not take naps during the day. · Keep your bedroom quiet, dark, and cool. · Get regular exercise, but not within 3 to 4 hours of your bedtime. .  · Sleep on a comfortable pillow and mattress.   · If watching the clock makes you anxious, turn it facing away from you so you cannot see the time. · If you worry when you lie down, start a worry book. Well before bedtime, write down your worries, and then set the book and your concerns aside. · Try meditation or other relaxation techniques before you go to bed. · If you cannot fall asleep, get up and go to another room until you feel sleepy. Do something relaxing. Repeat your bedtime routine before you go to bed again. · Make your house quiet and calm about an hour before bedtime. Turn down the lights, turn off the TV, log off the computer, and turn down the volume on music. This can help you relax after a busy day. Drowsy Driving: The [employer] cites drowsiness as a causing factor in more than 096,183 police reported crashes annually, resulting in 76,000 injuries and 1,500 deaths. Other surveys suggest 55% of people polled have driven while drowsy in the past year, 23% had fallen asleep but not crashed, 3% crashed, and 2% had and accident due to drowsy driving. Who is at risk? Young Drivers: One study of drowsy driving accidents states that 55% of the drivers were under 25 years. Of those, 75% were male. Shift Workers and Travelers: People who work overnight or travel across time zones frequently are at higher risk of experiencing Circadian Rhythm Disorders. They are trying to work and function when their body is programed to sleep. Sleep Deprived: Lack of sleep has a serious impact on your ability to pay attention or focus on a task. Consistently getting less than the average of 8 hours your body needs creates partial or cumulative sleep deprivation. Untreated Sleep Disorders: Sleep Apnea, Narcolepsy, R.L.S., and other sleep disorders (untreated) prevent a person from getting enough restful sleep. This leads to excessive daytime sleepiness and increases the risk for drowsy driving accidents by up to 7 times.   Medications / Alcohol: Even over the counter medications can cause drowsiness. Medications that impair a drivers attention should have a warning label. Alcohol naturally makes you sleepy and on its own can cause accidents. Combined with excessive drowsiness its effects are amplified. Signs of Drowsy Driving:   * You don't remember driving the last few miles   * You may drift out of your angelito   * You are unable to focus and your thoughts wander   * You may yawn more often than normal   * You have difficulty keeping your eyes open / nodding off   * Missing traffic signs, speeding, or tailgating  Prevention-   Good sleep hygiene, lifestyle and behavioral choices have the most impact on drowsy driving. There is no substitute for sleep and the average person requires 8 hours nightly. If you find yourself driving drowsy, stop and sleep. Consider the sleep hygiene tips provided during your visit as well. Medication Refill Policy: Refills for all medications require 1 week advance notice. Please have your pharmacy fax a refill request. We are unable to fax, or call in \"controled substance\" medications and you will need to pick these prescriptions up from our office. Lagotek Activation    Thank you for requesting access to Lagotek. Please follow the instructions below to securely access and download your online medical record. Lagotek allows you to send messages to your doctor, view your test results, renew your prescriptions, schedule appointments, and more. How Do I Sign Up? 1. In your internet browser, go to [email or web address]. 2. Click on the First Time User? Click Here link in the Sign In box. You will see the New Member Sign Up page. 3. Enter your Lagotek Access Code exactly as it appears below. You will not need to use this code after youve completed the sign-up process. If you do not sign up before the expiration date, you must request a new code. Lagotek Access Code:  Activation code not generated  Current HandMinder Status: Active (This is the date your HandMinder access code will )    4. Enter the last four digits of your Social Security Number (xxxx) and Date of Birth (mm/dd/yyyy) as indicated and click Submit. You will be taken to the next sign-up page. 5. Create a MedAdherencet ID. This will be your MedAdherencet login ID and cannot be changed, so think of one that is secure and easy to remember. 6. Create a HandMinder password. You can change your password at any time. 7. Enter your Password Reset Question and Answer. This can be used at a later time if you forget your password. 8. Enter your e-mail address. You will receive e-mail notification when new information is available in [address] 9. Click Sign Up. You can now view and download portions of your medical record. 10. Click the Download Summary menu link to download a portable copy of your medical information. Additional Information    If you have questions, please call [phone]. Remember, HandMinder is NOT to be used for urgent needs. For medical emergencies, dial 911.

## 2022-06-22 ENCOUNTER — TELEPHONE (OUTPATIENT)
Dept: INTERNAL MEDICINE CLINIC | Age: 72
End: 2022-06-22

## 2022-06-22 NOTE — PATIENT INSTRUCTIONS
Medicare Wellness Visit, Female     The best way to live healthy is to have a lifestyle where you eat a well-balanced diet, exercise regularly, limit alcohol use, and quit all forms of tobacco/nicotine, if applicable. Regular preventive services are another way to keep healthy. Preventive services (vaccines, screening tests, monitoring & exams) can help personalize your care plan, which helps you manage your own care. Screening tests can find health problems at the earliest stages, when they are easiest to treat. [facility] follows the current, evidence-based guidelines published by the [facility] Socrates Joshi ([facility]) when recommending preventive services for our patients. Because we follow these guidelines, sometimes recommendations change over time as research supports it. (For example, mammograms used to be recommended annually. Even though Medicare will still pay for an annual mammogram, the newer guidelines recommend a mammogram every two years for women of average risk). Of course, you and your doctor may decide to screen more often for some diseases, based on your risk and your co-morbidities (chronic disease you are already diagnosed with). Preventive services for you include:  - Medicare offers their members a free annual wellness visit, which is time for you and your primary care provider to discuss and plan for your preventive service needs. Take advantage of this benefit every year!  -All adults over the age of 72 should receive the recommended pneumonia vaccines. Current USPSTF guidelines recommend a series of two vaccines for the best pneumonia protection.   -All adults should have a flu vaccine yearly and a tetanus vaccine every 10 years.   -All adults age 48 and older should receive the shingles vaccines (series of two vaccines).       -All adults age 38-68 who are overweight should have a diabetes screening test once every three years.   -All adults born between [birth year] and 1965 should be screened once for Hepatitis C.  -Other screening tests and preventive services for persons with diabetes include: an eye exam to screen for diabetic retinopathy, a kidney function test, a foot exam, and stricter control over your cholesterol.   -Cardiovascular screening for adults with routine risk involves an electrocardiogram (ECG) at intervals determined by your doctor.   -Colorectal cancer screenings should be done for adults age 54-65 with no increased risk factors for colorectal cancer. There are a number of acceptable methods of screening for this type of cancer. Each test has its own benefits and drawbacks. Discuss with your doctor what is most appropriate for you during your annual wellness visit. The different tests include: colonoscopy (considered the best screening method), a fecal occult blood test, a fecal DNA test, and sigmoidoscopy.    -A bone mass density test is recommended when a woman turns 65 to screen for osteoporosis. This test is only recommended one time, as a screening. Some providers will use this same test as a disease monitoring tool if you already have osteoporosis. -Breast cancer screenings are recommended every other year for women of normal risk, age 54-69.  -Cervical cancer screenings for women over age 72 are only recommended with certain risk factors.

## 2022-06-22 NOTE — PROGRESS NOTES
Anum Crouch is a 81 y.o. female who presents for follow-up. Patient's has been tested positive for COVID on 6/15. She developed URI symptoms. Today reports residual cough, taking otc cough medication. Prior immunization. Treated for HLP.  Trying to follow low fat diet.  On simvastatin, no myalgias    Patient is seen by sleep medicine for obstructive sleep apnea. History of left breast cancer. S/p lumpectomy 2016. XRT 2017. Started on letrozole 2017. Followed by Dr. Sarwat Hayes. prior right mastectomy 1991. DEXA scan Feb 2019. Osteopenia in LS spine, started on Prolia by oncology, received one injection. On hold due to dental work.      Prediabetic.  HbA1C 5.9% in Oct 2020 Trying to follow diet.       JELLY with CPAP.      Colon screening, normal, Feb 2020, Dr Claudette Gutierrez. normal BM.        Up to date on eye exam.        Past Medical History:   Diagnosis Date    Adenocarcinoma of breast ([facility] [address] [address]     2017 LEFT     Breast cancer ([facility] [address]     1991 RIGHT    Cancer [facility]) [id number]     breast cancer right,left breast    GERD (gastroesophageal reflux disease)     Hypercholesterolemia     Menopause     Nausea & vomiting     Radiation therapy complication 6084    left breast    Sleep apnea     CPAP       Family History   Problem Relation Age of Onset    Breast Cancer Mother     Dementia Mother     Diabetes Mother     Heart Disease Father     Stroke Father     Cancer Sister         pancreatic    Cancer Brother         lung    Diabetes Sister     Hypertension Sister        Social History     Socioeconomic History    Marital status:      Spouse name: Not on file    Number of children: Not on file    Years of education: Not on file    Highest education level: Not on file   Occupational History    Not on file   Tobacco Use    Smoking status: Never Smoker    Smokeless tobacco: Never Used   Vaping Use    Vaping Use: Never used   Substance and Sexual Activity    Alcohol use: No    Drug use: Never    Sexual activity: Yes     Partners: Male     Birth control/protection: None   Other Topics Concern    Not on file   Social History Narrative    Not on file     Social Determinants of Health     Financial Resource Strain:     Difficulty of Paying Living Expenses: Not on file   Food Insecurity:     Worried About Running Out of Food in the Last Year: Not on file    Amy of Food in the Last Year: Not on file   Transportation Needs:     Lack of Transportation (Medical): Not on file    Lack of Transportation (Non-Medical): Not on file   Physical Activity:     Days of Exercise per Week: Not on file    Minutes of Exercise per Session: Not on file   Stress:     Feeling of Stress : Not on file   Social Connections:     Frequency of Communication with Friends and Family: Not on file    Frequency of Social Gatherings with Friends and Family: Not on file    Attends Rastafarian Services: Not on file    Active Member of [address] [place] [address] or Organizations: Not on file    Attends Club or Organization Meetings: Not on file    Marital Status: Not on file   Intimate Partner Violence:     Fear of Current or Ex-Partner: Not on file    Emotionally Abused: Not on file    Physically Abused: Not on file    Sexually Abused: Not on file   Housing Stability:     Unable to Pay for Housing in the Last Year: Not on file    Number of [place] in the Last Year: Not on file    Unstable Housing in the Last Year: Not on file       Current Outpatient Medications on File Prior to Visit   Medication Sig Dispense Refill    letrozole ([address]) 2.5 mg tablet TAKE 1 TABLET BY MOUTH DAILY 90 Tablet 0    cholecalciferol, vitamin D3, (VITAMIN D3) 2,000 unit tab Take one capsule daily 90 Tab 3    ASPIR-LOW 81 mg tablet Takes irregularly,usually weekly      [DISCONTINUED] simvastatin (ZOCOR) 20 mg tablet Take 1 Tab by mouth nightly.  90 Tab 3    [DISCONTINUED] ergocalciferol (ERGOCALCIFEROL) 50,000 unit capsule Take 1 Cap by mouth every twenty-eight (28) days. 3 Cap 3    [DISCONTINUED] multivitamin with iron (DAILY MULTI-VITAMINS/IRON) tablet Take 1 Tab by mouth daily. No current facility-administered medications on file prior to visit. Review of Systems  Pertinent items are noted in HPI. Objective:     Visit Vitals  /74 (BP 1 Location: Left upper arm, BP Patient Position: Sitting, BP Cuff Size: Large adult)   Pulse 85   Temp 97.8 °F (36.6 °C) (Temporal)   Resp 16   Ht 5' 6\" (1.676 m)   Wt 240 lb (108.9 kg)   SpO2 98%   BMI 38.74 kg/m²     Gen: well appearing female  HEENT:   PERRL,normal conjunctiva. External ear and canals normal, TMs no opacification or erythema,  OP no erythema, no exudates, MMM  Neck:  Supple. Thyroid normal size, nontender, without nodules. No masses or LAD  Resp:  No wheezing, no rhonchi, no rales. CV:  RRR, normal S1S2, no murmur. GI: soft, nontender, without masses. No hepatosplenomegaly. Extrem:  +2 pulses, no pitting edema, warm distally, bilateral lymphedema. Assessment/Plan:       ICD-10-CM ICD-9-CM    1. Pure hypercholesterolemia  B22.08 727.0 METABOLIC PANEL, COMPREHENSIVE      CBC W/O DIFF      LIPID PANEL      simvastatin (ZOCOR) 20 mg tablet   2. Malignant neoplasm of upper-outer quadrant of left breast in female, estrogen receptor positive (HCC)  C50.412 174.4     Z17.0 V86.0    3. Medicare annual wellness visit, subsequent  Z00.00 V70.0    4. Screening for depression  Z13.31 V79.0 [place] [address]   5. Severe obesity (BMI 35.0-39. 9) with comorbidity ([facility] [address] [address]  Q06.82 151.21 METABOLIC PANEL, COMPREHENSIVE      CBC W/O DIFF   6. Vitamin D deficiency  E55.9 268.9 VITAMIN D, 25 HYDROXY   7. Elevated glucose  R73.09 790.29    8. Prediabetes  C45.35 370.23 METABOLIC PANEL, COMPREHENSIVE      HEMOGLOBIN A1C WITH EAG   9. Hearing loss, unspecified hearing loss type, unspecified laterality  H91.90 389.9 REFERRAL TO AUDIOLOGY   work on weight reduction. Low fat diabetic diet. Follow-up and Dispositions    · Return for follow up pending labs and 6 months. Maxwell Garcia MD    This is the Subsequent Medicare Annual Wellness Exam, performed 12 months or more after the Initial AWV or the last Subsequent AWV    I have reviewed the patient's medical history in detail and updated the computerized patient record. Assessment/Plan   Education and counseling provided:  Are appropriate based on today's review and evaluation    1. Pure hypercholesterolemia  -     METABOLIC PANEL, COMPREHENSIVE; Future  -     CBC W/O DIFF; Future  -     LIPID PANEL; Future  -     simvastatin (ZOCOR) 20 mg tablet; Take 1 Tablet by mouth nightly., Normal, Disp-90 Tablet, R-3  2. Malignant neoplasm of upper-outer quadrant of left breast in female, estrogen receptor positive ([facility]  3. Medicare annual wellness visit, subsequent  4. Screening for depression  -     DEPRESSION SCREEN ANNUAL  5. Severe obesity (BMI 35.0-39. 9) with comorbidity ([facility]  -     METABOLIC PANEL, COMPREHENSIVE; Future  -     CBC W/O DIFF; Future  6. Vitamin D deficiency  -     VITAMIN D, 25 HYDROXY; Future  7. Elevated glucose  8. Prediabetes  -     METABOLIC PANEL, COMPREHENSIVE; Future  -     HEMOGLOBIN A1C WITH EAG; Future  9. Hearing loss, unspecified hearing loss type, unspecified laterality  -     REFERRAL TO AUDIOLOGY       Depression Risk Factor Screening     3 most recent PHQ Screens 6/23/2022   Little interest or pleasure in doing things Not at all   Feeling down, depressed, irritable, or hopeless Not at all   Total Score PHQ 2 0       Alcohol & Drug Abuse Risk Screen    Do you average more than 1 drink per night or more than 7 drinks a week:  No    On any one occasion in the past three months have you have had more than 3 drinks containing alcohol:  No          Functional Ability and Level of Safety    Hearing: Hearing is good. Activities of Daily Living: The home contains: no safety equipment.   Patient does total self care      Ambulation: with no difficulty     Fall Risk:  Fall Risk Assessment, last 12 mths 6/23/2022   Able to walk? Yes   Fall in past 12 months? 0   Do you feel unsteady? 0   Are you worried about falling 0      Abuse Screen:  Patient is not abused       Cognitive Screening    Has your family/caregiver stated any concerns about your memory: no     Cognitive Screening: Normal - Verbal Fluency Test    Health Maintenance Due     Health Maintenance Due   Topic Date Due    DTaP/Tdap/Td series (1 - Tdap) Never done    Shingrix Vaccine Age 50> (1 of 2) Never done    COVID-19 Vaccine (3 - Booster for Pfizer series) 08/24/2021    Depression Screen  10/19/2021    Lipid Screen  10/19/2021    Pneumococcal 65+ years (2 - PPSV23 or PCV20) 03/01/2022       Patient Care Team   Patient Care Team:  Tami Casanova MD as PCP - General (Internal Medicine Physician)  Tami Casanova MD as PCP - [address] [place] [address] Provider  Indu William MD (Surgery General)  Destiny Montoya MD as Physician (Hematology and Oncology)  Penelope Gastelum MD as Physician (Radiation Oncology)  Daniel Fuller MD as Physician (Sleep Medicine Physician)  Mekhi Joy NP (Nurse Practitioner)  Mekhi Joy NP (Nurse Practitioner)  Mekhi Joy NP (Nurse Practitioner)    History     Patient Active Problem List   Diagnosis Code    Breast cancer, left ([facility] [address] C50.912    Pure hypercholesterolemia E78.00    Vitamin D deficiency E55.9    Osteopenia of spine M85.88    Family history of diabetes mellitus Z83.3    Gastroesophageal reflux disease without esophagitis K21.9    Severe obesity (BMI 35.0-39. 9) with comorbidity ([facility] [address] E66.01     Past Medical History:   Diagnosis Date    Adenocarcinoma of breast ([facility] [address]     2017 LEFT     Breast cancer ([facility] [address]     1991 RIGHT    Cancer [facility]) [id number]     breast cancer right,left breast    GERD (gastroesophageal reflux disease)     Hypercholesterolemia     Menopause     Nausea & vomiting  Radiation therapy complication 2992    left breast    Sleep apnea     CPAP      Past Surgical History:   Procedure Laterality Date    COLONOSCOPY N/A 2/20/2020    COLON[facility] at [facility]  2/20/2020         HX BREAST BIOPSY Left 2016    stereotatic    HX BREAST LUMPECTOMY Left 1/18/2017    LEFT BREAST LUMPECTOMY AND LEFT BREAST SENTINEL NODE BIOPSY WITH BLUE DYE performed by Codi Ahmadi MD at [address] HX MASTECTOMY Right 1991    HX OOPHORECTOMY      HX JACKLYN AND BSO      1 [facility]    right mastectomy,lymph node biopsy     Current Outpatient Medications   Medication Sig Dispense Refill    simvastatin (ZOCOR) 20 mg tablet Take 1 Tablet by mouth nightly.  90 Tablet 3    letrozole (FEMARA) 2.5 mg tablet TAKE 1 TABLET BY MOUTH DAILY 90 Tablet 0    cholecalciferol, vitamin D3, (VITAMIN D3) 2,000 unit tab Take one capsule daily 90 Tab 3    ASPIR-LOW 81 mg tablet Takes irregularly,usually weekly       Allergies   Allergen Reactions    Other Medication Hives     holter monitor pads       Family History   Problem Relation Age of Onset    Breast Cancer Mother     Dementia Mother     Diabetes Mother     Heart Disease Father     Stroke Father     Cancer Sister         pancreatic    Cancer Brother         lung    Diabetes Sister     Hypertension Sister      Social History     Tobacco Use    Smoking status: Never Smoker    Smokeless tobacco: Never Used   Substance Use Topics    Alcohol use: No         Darnell Ray MD

## 2022-06-23 ENCOUNTER — OFFICE VISIT (OUTPATIENT)
Dept: INTERNAL MEDICINE CLINIC | Age: 72
End: 2022-06-23
Payer: MEDICARE

## 2022-06-23 VITALS
SYSTOLIC BLOOD PRESSURE: 109 MMHG | BODY MASS INDEX: 38.57 KG/M2 | DIASTOLIC BLOOD PRESSURE: 74 MMHG | HEIGHT: 66 IN | TEMPERATURE: 97.8 F | WEIGHT: 240 LBS | HEART RATE: 85 BPM | OXYGEN SATURATION: 98 % | RESPIRATION RATE: 16 BRPM

## 2022-06-23 DIAGNOSIS — Z13.31 SCREENING FOR DEPRESSION: ICD-10-CM

## 2022-06-23 DIAGNOSIS — Z00.00 MEDICARE ANNUAL WELLNESS VISIT, SUBSEQUENT: ICD-10-CM

## 2022-06-23 DIAGNOSIS — C50.412 MALIGNANT NEOPLASM OF UPPER-OUTER QUADRANT OF LEFT BREAST IN FEMALE, ESTROGEN RECEPTOR POSITIVE (HCC): ICD-10-CM

## 2022-06-23 DIAGNOSIS — R73.03 PREDIABETES: ICD-10-CM

## 2022-06-23 DIAGNOSIS — H91.90 HEARING LOSS, UNSPECIFIED HEARING LOSS TYPE, UNSPECIFIED LATERALITY: ICD-10-CM

## 2022-06-23 DIAGNOSIS — E55.9 VITAMIN D DEFICIENCY: ICD-10-CM

## 2022-06-23 DIAGNOSIS — E78.00 PURE HYPERCHOLESTEROLEMIA: Primary | ICD-10-CM

## 2022-06-23 DIAGNOSIS — Z17.0 MALIGNANT NEOPLASM OF UPPER-OUTER QUADRANT OF LEFT BREAST IN FEMALE, ESTROGEN RECEPTOR POSITIVE (HCC): ICD-10-CM

## 2022-06-23 DIAGNOSIS — R73.09 ELEVATED GLUCOSE: ICD-10-CM

## 2022-06-23 DIAGNOSIS — E66.01 SEVERE OBESITY (BMI 35.0-39.9) WITH COMORBIDITY (HCC): ICD-10-CM

## 2022-06-23 LAB
25(OH)D3 SERPL-MCNC: 48.7 NG/ML (ref 30–100)
ALBUMIN SERPL-MCNC: 4.3 G/DL (ref 3.5–5)
ALBUMIN/GLOB SERPL: 1.2 {RATIO} (ref 1.1–2.2)
ALP SERPL-CCNC: 78 U/L (ref 45–117)
ALT SERPL-CCNC: 27 U/L (ref 12–78)
ANION GAP SERPL CALC-SCNC: 7 MMOL/L (ref 5–15)
AST SERPL-CCNC: 18 U/L (ref 15–37)
BILIRUB SERPL-MCNC: 0.6 MG/DL (ref 0.2–1)
BUN SERPL-MCNC: 6 MG/DL (ref 6–20)
BUN/CREAT SERPL: 5 (ref 12–20)
CALCIUM SERPL-MCNC: 9.8 MG/DL (ref 8.5–10.1)
CHLORIDE SERPL-SCNC: 106 MMOL/L (ref 97–108)
CHOLEST SERPL-MCNC: 141 MG/DL
CO2 SERPL-SCNC: 26 MMOL/L (ref 21–32)
CREAT SERPL-MCNC: 1.11 MG/DL (ref 0.55–1.02)
ERYTHROCYTE [DISTWIDTH] IN BLOOD BY AUTOMATED COUNT: 14.6 % (ref 11.5–14.5)
EST. AVERAGE GLUCOSE BLD GHB EST-MCNC: 134 MG/DL
GLOBULIN SER CALC-MCNC: 3.6 G/DL (ref 2–4)
GLUCOSE SERPL-MCNC: 104 MG/DL (ref 65–100)
HBA1C MFR BLD: 6.3 % (ref 4–5.6)
HCT VFR BLD AUTO: 40.6 % (ref 35–47)
HDLC SERPL-MCNC: 37 MG/DL
HDLC SERPL: 3.8 {RATIO} (ref 0–5)
HGB BLD-MCNC: 14 G/DL (ref 11.5–16)
LDLC SERPL CALC-MCNC: 83.2 MG/DL (ref 0–100)
MCH RBC QN AUTO: 29.7 PG (ref 26–34)
MCHC RBC AUTO-ENTMCNC: 34.5 G/DL (ref 30–36.5)
MCV RBC AUTO: 86 FL (ref 80–99)
NRBC # BLD: 0 K/UL (ref 0–0.01)
NRBC BLD-RTO: 0 PER 100 WBC
PLATELET # BLD AUTO: 219 K/UL (ref 150–400)
PMV BLD AUTO: 11.6 FL (ref 8.9–12.9)
POTASSIUM SERPL-SCNC: 4.6 MMOL/L (ref 3.5–5.1)
PROT SERPL-MCNC: 7.9 G/DL (ref 6.4–8.2)
RBC # BLD AUTO: 4.72 M/UL (ref 3.8–5.2)
SODIUM SERPL-SCNC: 139 MMOL/L (ref 136–145)
TRIGL SERPL-MCNC: 104 MG/DL (ref ?–150)
VLDLC SERPL CALC-MCNC: 20.8 MG/DL
WBC # BLD AUTO: 3.6 K/UL (ref 3.6–11)

## 2022-06-23 PROCEDURE — 99214 OFFICE O/P EST MOD 30 MIN: CPT | Performed by: FAMILY MEDICINE

## 2022-06-23 PROCEDURE — 1101F PT FALLS ASSESS-DOCD LE1/YR: CPT | Performed by: FAMILY MEDICINE

## 2022-06-23 PROCEDURE — G8399 PT W/DXA RESULTS DOCUMENT: HCPCS | Performed by: FAMILY MEDICINE

## 2022-06-23 PROCEDURE — G8427 DOCREV CUR MEDS BY ELIG CLIN: HCPCS | Performed by: FAMILY MEDICINE

## 2022-06-23 PROCEDURE — G0463 HOSPITAL OUTPT CLINIC VISIT: HCPCS | Performed by: FAMILY MEDICINE

## 2022-06-23 PROCEDURE — G9899 SCRN MAM PERF RSLTS DOC: HCPCS | Performed by: FAMILY MEDICINE

## 2022-06-23 PROCEDURE — G8536 NO DOC ELDER MAL SCRN: HCPCS | Performed by: FAMILY MEDICINE

## 2022-06-23 PROCEDURE — G8417 CALC BMI ABV UP PARAM F/U: HCPCS | Performed by: FAMILY MEDICINE

## 2022-06-23 PROCEDURE — G8510 SCR DEP NEG, NO PLAN REQD: HCPCS | Performed by: FAMILY MEDICINE

## 2022-06-23 PROCEDURE — 1090F PRES/ABSN URINE INCON ASSESS: CPT | Performed by: FAMILY MEDICINE

## 2022-06-23 PROCEDURE — 3017F COLORECTAL CA SCREEN DOC REV: CPT | Performed by: FAMILY MEDICINE

## 2022-06-23 PROCEDURE — G0439 PPPS, SUBSEQ VISIT: HCPCS | Performed by: FAMILY MEDICINE

## 2022-06-23 RX ORDER — SIMVASTATIN 20 MG/1
20 TABLET, FILM COATED ORAL
Qty: 90 TABLET | Refills: 3 | Status: SHIPPED | OUTPATIENT
Start: 2022-06-23

## 2022-06-23 NOTE — PROGRESS NOTES
1. \"Have you been to the ER, urgent care clinic since your last visit? Hospitalized since your last visit? \" No    2. \"Have you seen or consulted any other health care providers outside of the [address] [place] [address] since your last visit? Yes, see care team     3. For patients aged 39-70: Has the patient had a colonoscopy / FIT/ Cologuard? Yes - no Care Gap present      If the patient is female:    4. For patients aged 41-77: Has the patient had a mammogram within the past 2 years? Current      5. For patients aged 21-65: Has the patient had a pap smear?  NA - based on age or sex

## 2022-06-27 ENCOUNTER — DOCUMENTATION ONLY (OUTPATIENT)
Dept: SURGERY | Age: 72
End: 2022-06-27

## 2022-06-27 DIAGNOSIS — C50.412 MALIGNANT NEOPLASM OF UPPER-OUTER QUADRANT OF LEFT BREAST IN FEMALE, ESTROGEN RECEPTOR POSITIVE (HCC): ICD-10-CM

## 2022-06-27 DIAGNOSIS — Z17.0 MALIGNANT NEOPLASM OF UPPER-OUTER QUADRANT OF LEFT BREAST IN FEMALE, ESTROGEN RECEPTOR POSITIVE (HCC): ICD-10-CM

## 2022-06-27 NOTE — PROGRESS NOTES
Received a request from [employer] insurance requesting medical records.  Faxed last mammogram,  Pathology reports from [phone] to  [phone]

## 2022-06-28 RX ORDER — LETROZOLE 2.5 MG/1
TABLET, FILM COATED ORAL
Qty: 90 TABLET | Refills: 0 | Status: SHIPPED | OUTPATIENT
Start: 2022-06-28 | End: 2022-09-27

## 2022-06-28 NOTE — TELEPHONE ENCOUNTER
VORB per provider, refill approved.    Requested Prescriptions   Pending Prescriptions Disp Refills    letrozole (FEMARA) 2.5 mg tablet [Pharmacy Med Name: LETROZOLE 2.5MG TABLETS] 90 Tablet 0     Sig: TAKE 1 TABLET BY MOUTH DAILY

## 2022-07-04 ENCOUNTER — PATIENT MESSAGE (OUTPATIENT)
Dept: INTERNAL MEDICINE CLINIC | Age: 72
End: 2022-07-04

## 2022-07-07 NOTE — PROGRESS NOTES
Soraida Jones is a 81 y.o. female here for one year follow up for right breast cancer. She is taking Letrozole. She is taking everyday. She is doing well. No concerns brought up. 1. Have you been to the ER, urgent care clinic since your last visit? Hospitalized since your last visit? no    2. Have you seen or consulted any other health care providers outside of the [address] [place] [address] since your last visit? Include any pap smears or colon screening.  no

## 2022-07-08 ENCOUNTER — OFFICE VISIT (OUTPATIENT)
Dept: ONCOLOGY | Age: 72
End: 2022-07-08
Payer: MEDICARE

## 2022-07-08 VITALS
WEIGHT: 243.8 LBS | DIASTOLIC BLOOD PRESSURE: 76 MMHG | HEART RATE: 79 BPM | HEIGHT: 66 IN | TEMPERATURE: 98.2 F | SYSTOLIC BLOOD PRESSURE: 125 MMHG | OXYGEN SATURATION: 95 % | BODY MASS INDEX: 39.18 KG/M2

## 2022-07-08 DIAGNOSIS — Z17.0 MALIGNANT NEOPLASM OF UPPER-OUTER QUADRANT OF LEFT BREAST IN FEMALE, ESTROGEN RECEPTOR POSITIVE (HCC): Primary | ICD-10-CM

## 2022-07-08 DIAGNOSIS — C50.412 MALIGNANT NEOPLASM OF UPPER-OUTER QUADRANT OF LEFT BREAST IN FEMALE, ESTROGEN RECEPTOR POSITIVE (HCC): Primary | ICD-10-CM

## 2022-07-08 PROCEDURE — 1101F PT FALLS ASSESS-DOCD LE1/YR: CPT | Performed by: INTERNAL MEDICINE

## 2022-07-08 PROCEDURE — 99213 OFFICE O/P EST LOW 20 MIN: CPT | Performed by: INTERNAL MEDICINE

## 2022-07-08 PROCEDURE — G9899 SCRN MAM PERF RSLTS DOC: HCPCS | Performed by: INTERNAL MEDICINE

## 2022-07-08 PROCEDURE — 1123F ACP DISCUSS/DSCN MKR DOCD: CPT | Performed by: INTERNAL MEDICINE

## 2022-07-08 PROCEDURE — G8432 DEP SCR NOT DOC, RNG: HCPCS | Performed by: INTERNAL MEDICINE

## 2022-07-08 PROCEDURE — 3017F COLORECTAL CA SCREEN DOC REV: CPT | Performed by: INTERNAL MEDICINE

## 2022-07-08 PROCEDURE — 1090F PRES/ABSN URINE INCON ASSESS: CPT | Performed by: INTERNAL MEDICINE

## 2022-07-08 PROCEDURE — G0463 HOSPITAL OUTPT CLINIC VISIT: HCPCS | Performed by: INTERNAL MEDICINE

## 2022-07-08 PROCEDURE — G8417 CALC BMI ABV UP PARAM F/U: HCPCS | Performed by: INTERNAL MEDICINE

## 2022-07-08 PROCEDURE — G8399 PT W/DXA RESULTS DOCUMENT: HCPCS | Performed by: INTERNAL MEDICINE

## 2022-07-08 PROCEDURE — G8427 DOCREV CUR MEDS BY ELIG CLIN: HCPCS | Performed by: INTERNAL MEDICINE

## 2022-07-08 PROCEDURE — G8536 NO DOC ELDER MAL SCRN: HCPCS | Performed by: INTERNAL MEDICINE

## 2022-07-08 NOTE — LETTER
7/8/2022    Patient: Edwin Souza   YOB: 1950   Date of Visit: 7/8/2022     Brandi Mcdaniel MD  [address]  [facility]  V[address] [facility] [address]    Dear Brandi Mcdaniel MD,      Thank you for referring Ms. Edwin Souza to [address] [place] [address] for evaluation. My notes for this consultation are attached. If you have questions, please do not hesitate to call me. I look forward to following your patient along with you.       Sincerely,    Anali Smith MD

## 2022-07-08 NOTE — PROGRESS NOTES
[address] [facility] [address], [address] [facility], [address] [place] [address] Ne, 200 [facility]  [phone]        Follow-up Note        Patient: Rajeev Pickard MRN: [id number]  SSN: [id number]    YOB: 1950  Age: 81 y.o. Sex: female        Diagnosis:     1. 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 On Letrozole - started 5/1/2017  2. Completed radiation therapy on 4/28/2017  3. Lumpectomy on 1/18/2017    Subjective:      Rajeev Pickard is a 81 y.o. female with a diagnosis of left sided breast carcinoma. She underwent a screening mammogram and was noted to have an abnormality in the left breast. Biopsy showed mucinous carcinoma which is ER and NC +ve. She was seen by Dr. Simeon Polanco and then underwent left breast lumpectomy on 01/18/2017. She finished radiation therapy on 4/28/2017. She started hormonal therapy with Letrozole and is tolerating it well without complaints. Her prolia was on hold d/t dental work. She does report minimal arthralgias. Review of Systems:    Constitutional: negative  Eyes: negative  Ears, Nose, Mouth, Throat, and Face: negative  Respiratory: negative  Cardiovascular: negative  Gastrointestinal: negative  Genitourinary:negative  Integument/Breast: negative  Hematologic/Lymphatic: negative  Musculoskeletal: mild arthralgias  Neurological: negative    ROS was pulled in from a previous visit. No changes were made d/t symptoms remain the same.       Past Medical History:   Diagnosis Date    Adenocarcinoma of breast ([address]     2017 LEFT     Breast cancer ([address]     1991 RIGHT    Cancer [facility]) [id number]     breast cancer right,left breast    GERD (gastroesophageal reflux disease)     Hypercholesterolemia     Menopause     Nausea & vomiting     Radiation therapy complication 2831    left breast    Sleep apnea     CPAP     Past Surgical History:   Procedure Laterality Date    COLONOSCOPY N/A 2/20/2020    COLONOSCOPY performed by Lam Urrutia MD at [facility]"" ENDOSCOPY    COLONOSCOPY,DIAGNOSTIC  2/20/2020         HX BREAST BIOPSY Left 2016    stereotatic    HX BREAST LUMPECTOMY Left 1/18/2017    LEFT BREAST LUMPECTOMY AND LEFT BREAST SENTINEL NODE BIOPSY WITH BLUE DYE performed by Ferny Hopkins MD at [address] HX MASTECTOMY Right 1991    HX OOPHORECTOMY      HX JACKLYN AND BSO      [address]    right mastectomy,lymph node biopsy      Family History   Problem Relation Age of Onset    Breast Cancer Mother     Dementia Mother     Diabetes Mother     Heart Disease Father     Stroke Father     Cancer Sister         pancreatic    Cancer Brother         lung    Diabetes Sister     Hypertension Sister      Social History     Tobacco Use    Smoking status: Never Smoker    Smokeless tobacco: Never Used   Substance Use Topics    Alcohol use: No      Prior to Admission medications    Medication Sig Start Date End Date Taking? Authorizing Provider   letrozole [facility]) 2.5 mg tablet TAKE 1 TABLET BY MOUTH DAILY 6/28/22  Yes Omar Metzger MD   simvastatin (ZOCOR) 20 mg tablet Take 1 Tablet by mouth nightly.  6/23/22  Yes Marissa Arceo MD   cholecalciferol, vitamin D3, (VITAMIN D3) 2,000 unit tab Take one capsule daily 9/25/19  Yes Marissa Arceo MD   ASPIR-LOW 81 mg tablet Takes irregularly,usually weekly 11/1/16  Yes Provider, Historical              Allergies   Allergen Reactions    Other Medication Hives     holter monitor pads           Objective:     Visit Vitals  /76 (BP 1 Location: Left upper arm, BP Patient Position: Sitting)   Pulse 79   Temp 98.2 °F (36.8 °C) (Temporal)   Ht 5' 6\" (1.676 m)   Wt 243 lb 12.8 oz (110.6 kg)   SpO2 95%   BMI 39.35 kg/m²       Pain Scale: 0 - No pain/10  Pain Location:       Physical Exam:    GENERAL: alert, cooperative  EYE: negative  LYMPHATIC: Cervical, supraclavicular, and axillary nodes normal.   THROAT & NECK: normal and no erythema or exudates noted. LUNG: clear to auscultation bilaterally  HEART: regular rate and rhythm  ABDOMEN: soft, non-tender  EXTREMITIES: no cyanosis or edema  SKIN: Normal.  NEUROLOGIC: negative      Physical exam was pulled in from a previous visit. No changes were made d/t physical exam remains the same. Assessment:     1. 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 Osteopenia    DEXA (2/12/2017): osteopenic  Prolia on hold for on-going dental work      3. Vitamin D deficiency    > Continue Vitamin D 50,000 units monthly      Plan:       > Continue Letrozole 2.5 mg daily -   > Continue Vitamin D monthly  > Follow-up in 1 yr    I performed a history and physical examination of the patient and discussed his management with the NPP. I reviewed the NPP note and agree with the documented findings and plan of care. The patient was seen in conjunction with Ms. Hair. Ms. Bradshaw Sonya a women with Stage I breast carcinoma. She is taking Letrozole. She is doing well. Her physical exam is normal.        Signed by: Tacho Kim MD                     July 8, 2022        CC. Delmy Alexander MD  CC.  Amy Restrepo MD

## 2022-07-08 NOTE — PROGRESS NOTES
[address] [facility] [address], [address] [facility], [address] [place] [address]  [phone]        Follow-up Note        Patient: Cristino Rodney MRN: [id number]  SSN: [id number]    YOB: 1950  Age: 81 y.o. Sex: female        Diagnosis:     1. 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 On Letrozole - started 5/1/2017  2. Completed radiation therapy on 4/28/2017  3. Lumpectomy on 1/18/2017    Subjective:      Cristino Rodney is a 81 y.o. female with a diagnosis of left sided breast carcinoma. She underwent a screening mammogram and was noted to have an abnormality in the left breast. Biopsy showed mucinous carcinoma which is ER and NC +ve. She was seen by Dr. Tyrus Sacks and then underwent left breast lumpectomy on 01/18/2017. She finished radiation therapy on 4/28/2017. She started hormonal therapy with Letrozole and is tolerating it well without complaints. Prolia is on hold for on-going dental work. She notes generalized arthralgias but has no new complaints. Review of Systems:    Constitutional: negative  Eyes: negative  Ears, Nose, Mouth, Throat, and Face: negative  Respiratory: negative  Cardiovascular: negative  Gastrointestinal: negative  Genitourinary:negative  Integument/Breast: negative  Hematologic/Lymphatic: negative  Musculoskeletal: mild arthralgias  Neurological: negative    Review of systems was reviewed and updated as needed on 07/08/22.     Past Medical History:   Diagnosis Date    Adenocarcinoma of breast ([address]     2017 LEFT     Breast cancer [facility]     1991 RIGHT    Cancer [facility]) [phone]     breast cancer right,left breast    GERD (gastroesophageal reflux disease)     Hypercholesterolemia     Menopause     Nausea & vomiting     Radiation therapy complication 6359    left breast    Sleep apnea     CPAP     Past Surgical History:   Procedure Laterality Date    COLONOSCOPY N/A 2/20/2020    COL[facility]COPY performed [facility] MD at [facility] ENDOSCOPY    COLONOSCOPY,DIAGNOSTIC  2/20/2020         HX BREAST BIOPSY Left 2016    stereotatic    HX BREAST LUMPECTOMY Left 1/18/2017    LEFT BREAST LUMPECTOMY AND LEFT BREAST SENTINEL NODE BIOPSY WITH BLUE DYE performed by Isaiah Mclean MD at [address] HX MASTECTOMY Right 1991    HX OOPHORECTOMY      HX JACKLYN AND BSO      [address]    right mastectomy,lymph node biopsy      Family History   Problem Relation Age of Onset    Breast Cancer Mother     Dementia Mother     Diabetes Mother     Heart Disease Father     Stroke Father     Cancer Sister         pancreatic    Cancer Brother         lung    Diabetes Sister     Hypertension Sister      Social History     Tobacco Use    Smoking status: Never Smoker    Smokeless tobacco: Never Used   Substance Use Topics    Alcohol use: No      Prior to Admission medications    Medication Sig Start Date End Date Taking? Authorizing Provider   letrozole [facility]) 2.5 mg tablet TAKE 1 TABLET BY MOUTH DAILY 6/28/22  Yes Lori Beltre MD   simvastatin (ZOCOR) 20 mg tablet Take 1 Tablet by mouth nightly.  6/23/22  Yes Alma Patterson MD   cholecalciferol, vitamin D3, (VITAMIN D3) 2,000 unit tab Take one capsule daily 9/25/19  Yes Alma Patterson MD   ASPIR-LOW 81 mg tablet Takes irregularly,usually weekly 11/1/16  Yes Provider, Historical              Allergies   Allergen Reactions    Other Medication Hives     holter monitor pads           Objective:     Visit Vitals  /76 (BP 1 Location: Left upper arm, BP Patient Position: Sitting)   Pulse 79   Temp 98.2 °F (36.8 °C) (Temporal)   Ht 5' 6\" (1.676 m)   Wt 243 lb 12.8 oz (110.6 kg)   SpO2 95%   BMI 39.35 kg/m²       Pain Scale: 0 - No pain/10  Pain Location:       Physical Exam:    GENERAL: alert, cooperative  EYE: negative  LYMPHATIC: Cervical, supraclavicular, and axillary nodes normal.   THROAT & NECK: normal and no erythema or exudates noted. LUNG: clear to auscultation bilaterally  HEART: regular rate and rhythm  ABDOMEN: soft, non-tender  EXTREMITIES: no cyanosis or edema  SKIN: Normal.  NEUROLOGIC: negative    The above physical exam was reviewed and updated as needed on 07/08/22. Assessment:     1. 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 Osteopenia    DEXA (2/12/2017): osteopenic  Prolia on hold for on-going dental work      3. Vitamin D deficiency    > Continue Vitamin D 50,000 units monthly      Plan:     > Continue Letrozole 2.5 mg daily  > Breast cancer index  > Continue Vitamin D monthly  > Follow-up in 1 year  > BCI      Signed By: Jackelyn Beltre NP     July 8, 2022        I personally saw and evaluated the patient and performed the key components of medical decision making with Daria Amador NP. I reviewed and verified the above documentation and modified it as needed. Ms. Blayne Barrera is a women with ER +ve breast carcinoma. She is receiving adjuvant AI and will continue today. I obtained history, performed physical exam, reviewed labs and imaging and communicated the treatment plan to the patient. Send BCI test on the specimen. Signed by: Artem Wyman MD                     July 8, 2022        CC. Marie Chambers MD  CC.  Lizette Tan MD

## 2022-08-31 ENCOUNTER — TELEPHONE (OUTPATIENT)
Dept: SLEEP MEDICINE | Age: 72
End: 2022-08-31

## 2022-09-21 ENCOUNTER — TRANSCRIBE ORDER (OUTPATIENT)
Dept: SCHEDULING | Age: 72
End: 2022-09-21

## 2022-09-21 DIAGNOSIS — Z12.31 SCREENING MAMMOGRAM FOR HIGH-RISK PATIENT: Primary | ICD-10-CM

## 2022-10-14 ENCOUNTER — HOSPITAL ENCOUNTER (OUTPATIENT)
Dept: MAMMOGRAPHY | Age: 72
Discharge: HOME | End: 2022-10-14
Attending: FAMILY MEDICINE
Payer: MEDICARE

## 2022-10-14 DIAGNOSIS — Z85.3 HISTORY OF LEFT BREAST CANCER: ICD-10-CM

## 2022-10-14 DIAGNOSIS — Z12.31 SCREENING MAMMOGRAM FOR HIGH-RISK PATIENT: ICD-10-CM

## 2022-10-14 DIAGNOSIS — Z85.3 HISTORY OF BREAST CANCER: ICD-10-CM

## 2022-10-14 DIAGNOSIS — Z90.11 H/O MASTECTOMY, RIGHT: ICD-10-CM

## 2022-10-14 PROCEDURE — 77061 BREAST TOMOSYNTHESIS UNI: CPT

## 2022-10-31 ENCOUNTER — OFFICE VISIT (OUTPATIENT)
Dept: SURGERY | Age: 72
End: 2022-10-31
Payer: MEDICARE

## 2022-10-31 VITALS
WEIGHT: 240 LBS | DIASTOLIC BLOOD PRESSURE: 81 MMHG | HEIGHT: 66 IN | SYSTOLIC BLOOD PRESSURE: 134 MMHG | HEART RATE: 96 BPM | BODY MASS INDEX: 38.57 KG/M2

## 2022-10-31 DIAGNOSIS — Z90.11 H/O RIGHT MASTECTOMY: ICD-10-CM

## 2022-10-31 DIAGNOSIS — Z85.3 HISTORY OF BREAST CANCER IN FEMALE: ICD-10-CM

## 2022-10-31 DIAGNOSIS — Z17.0 MALIGNANT NEOPLASM OF UPPER-OUTER QUADRANT OF LEFT BREAST IN FEMALE, ESTROGEN RECEPTOR POSITIVE (HCC): Primary | ICD-10-CM

## 2022-10-31 DIAGNOSIS — Z12.31 ENCOUNTER FOR SCREENING MAMMOGRAM FOR BREAST CANCER: ICD-10-CM

## 2022-10-31 DIAGNOSIS — C50.412 MALIGNANT NEOPLASM OF UPPER-OUTER QUADRANT OF LEFT BREAST IN FEMALE, ESTROGEN RECEPTOR POSITIVE (HCC): Primary | ICD-10-CM

## 2022-10-31 DIAGNOSIS — Z98.890 S/P LUMPECTOMY, LEFT BREAST: ICD-10-CM

## 2022-10-31 PROCEDURE — 3017F COLORECTAL CA SCREEN DOC REV: CPT | Performed by: NURSE PRACTITIONER

## 2022-10-31 PROCEDURE — G9899 SCRN MAM PERF RSLTS DOC: HCPCS | Performed by: NURSE PRACTITIONER

## 2022-10-31 PROCEDURE — 1123F ACP DISCUSS/DSCN MKR DOCD: CPT | Performed by: NURSE PRACTITIONER

## 2022-10-31 PROCEDURE — G8536 NO DOC ELDER MAL SCRN: HCPCS | Performed by: NURSE PRACTITIONER

## 2022-10-31 PROCEDURE — G8427 DOCREV CUR MEDS BY ELIG CLIN: HCPCS | Performed by: NURSE PRACTITIONER

## 2022-10-31 PROCEDURE — G8399 PT W/DXA RESULTS DOCUMENT: HCPCS | Performed by: NURSE PRACTITIONER

## 2022-10-31 PROCEDURE — 1101F PT FALLS ASSESS-DOCD LE1/YR: CPT | Performed by: NURSE PRACTITIONER

## 2022-10-31 PROCEDURE — 99213 OFFICE O/P EST LOW 20 MIN: CPT | Performed by: NURSE PRACTITIONER

## 2022-10-31 PROCEDURE — G8432 DEP SCR NOT DOC, RNG: HCPCS | Performed by: NURSE PRACTITIONER

## 2022-10-31 PROCEDURE — 1090F PRES/ABSN URINE INCON ASSESS: CPT | Performed by: NURSE PRACTITIONER

## 2022-10-31 PROCEDURE — G8417 CALC BMI ABV UP PARAM F/U: HCPCS | Performed by: NURSE PRACTITIONER

## 2022-10-31 NOTE — PROGRESS NOTES
HISTORY OF PRESENT ILLNESS  Roosevelt Menard is a 81 y.o. female. HPI ESTABLISHED patient here for follow up LEFT breast cancer. Denies breast mass, skin changes, nipple discharge and pain. History of breast cancer-  - RIGHT mastectomy. No chemo or radiation. - LEFT breast 1 cm mucinous carcinoma. 0/4 nodes, %. MA 80%, Her 2 negative  2016 LEFT lumpectomy, [facility] - Dr. Ne Crouch  17- 2ml old blood aspirated by Dr. Ne Crouch  17- debridement  17- clot debridement with sutures placed  17- completed radiation. Followed by Dr. Reno Calixto. 17- started Letrozole. Followed by Dr. Ellis Musa. Family history-  Mother had breast cancer in her 66's. Sister  from pancreatic cancer age 54. OB History    No obstetric history on file. Obstetric Comments   Menarche: 17. LMP:52  # of Children: 2. Age at Delivery of First Child:  24   Hysterectomy/oophorectomy:  Yes/one ovary removed. Breast Bx: yes   Hx of Breast Feeding:  no  BCP:  yes Hormone therapy: no.                    Past Surgical History:   Procedure Laterality Date    COLONOSCOPY N/A 2020    COLONOSCOPY performed by Jaron Duron MD at [facility] ENDOSCOPY    COLONOSCOPY,DIAGNOSTIC  2020         HX BREAST BIOPSY Left     stereotatic    HX BREAST LUMPECTOMY Left 2017    LEFT BREAST LUMPECTOMY AND LEFT BREAST SENTINEL NODE BIOPSY WITH BLUE DYE performed by Arnie Rivera MD at [address] [place] [address]    HX MASTECTOMY Right     HX OOPHORECTOMY      HX JACKLYN AND BSO      [facility]    right mastectomy,lymph node biopsy         [facility] Results (most recent):  Results from Hospital Encounter encounter on 10/14/22    YIMI 3D JASBIR W MAMMO LT DX INCL CAD    Addendum 10/19/2022 11:21 AM  Addendum:    Tomosynthesis of the LEFT breast in the CC and MLO projections was performed.     Narrative  STUDY:  LEFT breast Diagnostic Digital Mammography    INDICATION:  Left lumpectomy for carcinoma January 2017    COMPARISON:  Prior mammograms [id number]    BREAST COMPOSITION: There are scattered fibroglandular densities. FINDINGS: LEFT breast digital diagnostic mammography was performed, and is  interpreted in conjunction with a computer assisted detection (CAD) system. Patient is status post RIGHT mastectomy. Lumpectomy changes are noted in the  left breast upper outer quadrant. No new masses or suspicious calcifications  are identified. Impression  1. BI-RADS Assessment Category 2: Benign finding. Left lumpectomy scar is  benign. Recommendation:  LEFT breast screening mammogram is recommended in one year. The findings of this exam and the recommendation were directly discussed with  the patient at the time of exam by myself. ROS    Physical Exam  Constitutional:       Appearance: Normal appearance. Chest:   Breasts:     Right: Absent. Left: No mass, nipple discharge, skin change or tenderness. Musculoskeletal:      Comments: FROM - UE x 2   Lymphadenopathy:      Upper Body:      Right upper body: No supraclavicular or axillary adenopathy. Left upper body: No supraclavicular or axillary adenopathy. Neurological:      Mental Status: She is alert. Psychiatric:         Attention and Perception: Attention normal.         Mood and Affect: Mood normal.         Speech: Speech normal.         Behavior: Behavior normal.     Visit Vitals  /81 (BP 1 Location: Right arm, BP Patient Position: Sitting, BP Cuff Size: Small adult)         ASSESSMENT and PLAN    ICD-10-CM ICD-9-CM    1. Malignant neoplasm of upper-outer quadrant of left breast in female, estrogen receptor positive (HCC)  C50.412 174.4     Z17.0 V86.0       2. H/O right mastectomy  Z90.11 V45.71 YIMI 3D JASBIR W MAMMO LT SCREENING INCL CAD      AMB SUPPLY ORDER      3. S/P lumpectomy, left breast  Z98.890 V45.89       4. History of breast cancer in female  Z85.3 V10.3       5.  Encounter for screening mammogram for breast cancer  Z12.31 V76.12 YIMI 3D JASBIR W MAMMO LT SCREENING INCL CAD          Normal exam with no evidence of local recurrence. Reviewed normal post treatment changes. RX given for bras/prosthesis. LSmammogram 3D in 10/2023. RTC here PRN since 5 years from diagnosis/treatment. She is comfortable with this plan. All questions answered and she stated understanding. Total time spent for this patient - 20 minutes.

## 2023-04-22 ENCOUNTER — TRANSCRIBE ORDERS (OUTPATIENT)
Facility: HOSPITAL | Age: 73
End: 2023-04-22

## 2023-04-22 DIAGNOSIS — Z12.31 SCREENING MAMMOGRAM FOR HIGH-RISK PATIENT: Primary | ICD-10-CM

## 2023-04-22 DIAGNOSIS — Z90.11 H/O RIGHT MASTECTOMY: Primary | ICD-10-CM

## 2023-04-22 DIAGNOSIS — Z12.31 ENCOUNTER FOR SCREENING MAMMOGRAM FOR BREAST CANCER: ICD-10-CM

## 2023-07-11 NOTE — PROGRESS NOTES
Linda Lovett is a 81 y.o. female here for one year follow up for right breast cancer. She is taking Letrozole. She is taking everyday. Pt doing well. No concerns brought up. 1. Have you been to the ER, urgent care clinic since your last visit? Hospitalized since your last visit?  no    2. Have you seen or consulted any other health care providers outside of the [address] [place] [address] since your last visit? Include any pap smears or colon screening.  no

## 2023-07-14 ENCOUNTER — OFFICE VISIT (OUTPATIENT)
Age: 73
End: 2023-07-14
Payer: MEDICARE

## 2023-07-14 VITALS
HEART RATE: 74 BPM | TEMPERATURE: 98.2 F | WEIGHT: 247.2 LBS | SYSTOLIC BLOOD PRESSURE: 127 MMHG | DIASTOLIC BLOOD PRESSURE: 80 MMHG | HEIGHT: 66 IN | BODY MASS INDEX: 39.73 KG/M2 | OXYGEN SATURATION: 97 %

## 2023-07-14 DIAGNOSIS — Z78.0 POST-MENOPAUSAL: ICD-10-CM

## 2023-07-14 DIAGNOSIS — Z79.811 AROMATASE INHIBITOR USE: ICD-10-CM

## 2023-07-14 DIAGNOSIS — C50.412 MALIGNANT NEOPLASM OF UPPER-OUTER QUADRANT OF LEFT BREAST IN FEMALE, ESTROGEN RECEPTOR POSITIVE (HCC): Primary | ICD-10-CM

## 2023-07-14 DIAGNOSIS — Z17.0 MALIGNANT NEOPLASM OF UPPER-OUTER QUADRANT OF LEFT BREAST IN FEMALE, ESTROGEN RECEPTOR POSITIVE (HCC): Primary | ICD-10-CM

## 2023-07-14 PROCEDURE — G8427 DOCREV CUR MEDS BY ELIG CLIN: HCPCS | Performed by: INTERNAL MEDICINE

## 2023-07-14 PROCEDURE — G8419 CALC BMI OUT NRM PARAM NOF/U: HCPCS | Performed by: INTERNAL MEDICINE

## 2023-07-14 PROCEDURE — 3017F COLORECTAL CA SCREEN DOC REV: CPT | Performed by: INTERNAL MEDICINE

## 2023-07-14 PROCEDURE — 1090F PRES/ABSN URINE INCON ASSESS: CPT | Performed by: INTERNAL MEDICINE

## 2023-07-14 PROCEDURE — 1123F ACP DISCUSS/DSCN MKR DOCD: CPT | Performed by: INTERNAL MEDICINE

## 2023-07-14 PROCEDURE — 99213 OFFICE O/P EST LOW 20 MIN: CPT | Performed by: INTERNAL MEDICINE

## 2023-07-14 PROCEDURE — 1036F TOBACCO NON-USER: CPT | Performed by: INTERNAL MEDICINE

## 2023-07-14 PROCEDURE — G8399 PT W/DXA RESULTS DOCUMENT: HCPCS | Performed by: INTERNAL MEDICINE

## 2023-07-14 RX ORDER — LETROZOLE 2.5 MG/1
2.5 TABLET, FILM COATED ORAL DAILY
Qty: 90 TABLET | Refills: 3 | Status: SHIPPED | OUTPATIENT
Start: 2023-07-14

## 2023-07-14 NOTE — PROGRESS NOTES
[facility]   at [address] [place] [address], [address] [place] [address] [facility], [address] [place] [address]   [phone]        Follow-up Note          Patient: Jessica Reis  MRN: [id number]   SSN: [id number]    YOB: 1950                Diagnosis:        1. Left breast carcinoma:   pT1b N0 M0 (Stage IA) mucinous carcinoma, Dx: 12/9/2016        Treatment:        1. On Letrozole - started 5/1/2017   2. Completed radiation therapy on 4/28/2017   3. Lumpectomy on 1/18/2017        Subjective:         Jessica Reis is a 81 y.o.  female with a diagnosis of left sided breast carcinoma. She underwent a screening  mammogram and was noted to have an abnormality in the left breast. Biopsy showed mucinous carcinoma which is ER and CT +ve. She was seen by Dr. Gómez Muñoz and then underwent left breast lumpectomy on 01/18/2017. She finished radiation therapy on 4/28/2017. She started hormonal therapy with Letrozole and is tolerating it well without complaints. Review of Systems:      Constitutional: negative   Eyes: negative   Ears, Nose, Mouth, Throat, and Face: negative   Respiratory: negative   Cardiovascular: negative   Gastrointestinal: negative   Genitourinary:negative   Integument/Breast: negative   Hematologic/Lymphatic: negative   Musculoskeletal: mild arthralgias   Neurological: negative      Review of systems was reviewed and updated as needed on 07/08/22 .        Past Medical History:   Diagnosis Date    Adenocarcinoma of breast ([address])     2017 LEFT     Breast cancer ([address])     1991 RIGHT    Cancer [facility]) [id number]     breast cancer right,left breast    GERD (gastroesophageal reflux disease)     Hypercholesterolemia     Menopause     Nausea & vomiting     Radiation therapy complication 8093    left breast    Sleep apnea     CPAP       Past Surgical History:   Procedure Laterality Date    BREAST BIOPSY Left 2016    stereotatic    BREAST LUMPECTOMY

## 2023-08-22 ENCOUNTER — TELEMEDICINE (OUTPATIENT)
Age: 73
End: 2023-08-22
Payer: MEDICARE

## 2023-08-22 DIAGNOSIS — U07.1 COVID-19: Primary | ICD-10-CM

## 2023-08-22 PROCEDURE — 3017F COLORECTAL CA SCREEN DOC REV: CPT | Performed by: FAMILY MEDICINE

## 2023-08-22 PROCEDURE — 99213 OFFICE O/P EST LOW 20 MIN: CPT | Performed by: FAMILY MEDICINE

## 2023-08-22 PROCEDURE — G8427 DOCREV CUR MEDS BY ELIG CLIN: HCPCS | Performed by: FAMILY MEDICINE

## 2023-08-22 PROCEDURE — 1090F PRES/ABSN URINE INCON ASSESS: CPT | Performed by: FAMILY MEDICINE

## 2023-08-22 PROCEDURE — 1123F ACP DISCUSS/DSCN MKR DOCD: CPT | Performed by: FAMILY MEDICINE

## 2023-08-22 PROCEDURE — 1036F TOBACCO NON-USER: CPT | Performed by: FAMILY MEDICINE

## 2023-08-22 PROCEDURE — G8417 CALC BMI ABV UP PARAM F/U: HCPCS | Performed by: FAMILY MEDICINE

## 2023-08-22 PROCEDURE — G8399 PT W/DXA RESULTS DOCUMENT: HCPCS | Performed by: FAMILY MEDICINE

## 2023-08-22 SDOH — ECONOMIC STABILITY: INCOME INSECURITY: HOW HARD IS IT FOR YOU TO PAY FOR THE VERY BASICS LIKE FOOD, HOUSING, MEDICAL CARE, AND HEATING?: NOT HARD AT ALL

## 2023-08-22 SDOH — ECONOMIC STABILITY: HOUSING INSECURITY
IN THE LAST 12 MONTHS, WAS THERE A TIME WHEN YOU DID NOT HAVE A STEADY PLACE TO SLEEP OR SLEPT IN A SHELTER (INCLUDING NOW)?: NO

## 2023-08-22 SDOH — ECONOMIC STABILITY: FOOD INSECURITY: WITHIN THE PAST 12 MONTHS, YOU WORRIED THAT YOUR FOOD WOULD RUN OUT BEFORE YOU GOT MONEY TO BUY MORE.: NEVER TRUE

## 2023-08-22 SDOH — ECONOMIC STABILITY: FOOD INSECURITY: WITHIN THE PAST 12 MONTHS, THE FOOD YOU BOUGHT JUST DIDN'T LAST AND YOU DIDN'T HAVE MONEY TO GET MORE.: NEVER TRUE

## 2023-08-22 ASSESSMENT — PATIENT HEALTH QUESTIONNAIRE - PHQ9
SUM OF ALL RESPONSES TO PHQ QUESTIONS 1-9: 0
SUM OF ALL RESPONSES TO PHQ QUESTIONS 1-9: 0
1. LITTLE INTEREST OR PLEASURE IN DOING THINGS: 0
SUM OF ALL RESPONSES TO PHQ QUESTIONS 1-9: 0
SUM OF ALL RESPONSES TO PHQ QUESTIONS 1-9: 0
SUM OF ALL RESPONSES TO PHQ9 QUESTIONS 1 & 2: 0
2. FEELING DOWN, DEPRESSED OR HOPELESS: 0

## 2023-08-22 NOTE — PROGRESS NOTES
1. \"Have you been to the ER, urgent care clinic since your last visit? Hospitalized since your last visit? \" No    2. \"Have you seen or consulted any other health care providers outside of the [address] [place] [address] since your last visit? \" No     3. For patients aged 43-73: Has the patient had a colonoscopy / FIT/ Cologuard? Yes      If the patient is female:    4. For patients aged 43-66: Has the patient had a mammogram within the past 2 years? Yes      5. For patients aged 21-65: Has the patient had a pap smear?  No

## 2023-08-22 NOTE — PROGRESS NOTES
Dimas Batista is a 72 y.o. female who presents with concern for COVID. Tested positive today, 2 tests. Reports dry cough, fever, malaise. Onset of symptoms, 3 days. Dimas Batista, was evaluated through a synchronous (real-time) audio-video encounter. The patient (or guardian if applicable) is aware that this is a billable service, which includes applicable co-pays. This Virtual Visit was conducted with patient's (and/or legal guardian's) consent. Patient identification was verified, and a caregiver was present when appropriate. The patient was located at Home: [address] [place] [address]  Provider was located at Home ([facility]): [facility]         Total time spent for this encounter: Not billed by time    --Kimberly Richmond MD on 8/22/2023 at 11:25 AM    An electronic signature was used to authenticate this note.        Past Medical History:   Diagnosis Date    Adenocarcinoma of breast ([address] [facility])     2017 LEFT     Breast cancer ([address] [place] [address])     1991 RIGHT    Cancer ([address] [facility]) [phone]     breast cancer right,left breast    GERD (gastroesophageal reflux disease)     Hypercholesterolemia     Menopause     Nausea & vomiting     Radiation therapy complication 4088    left breast    Sleep apnea     CPAP       Family History   Problem Relation Age of Onset    Hypertension Sister     Diabetes Sister     Cancer Brother         lung    Cancer Sister         pancreatic    Stroke Father     Heart Disease Father     Diabetes Mother     Dementia Mother     Breast Cancer Mother         Social History     Socioeconomic History    Marital status:      Spouse name: Not on file    Number of children: Not on file    Years of education: Not on file    Highest education level: Not on file   Occupational History    Not on file   Tobacco Use    Smoking status: Never    Smokeless tobacco: Never   Substance and Sexual Activity    Alcohol use: No    Drug use: Never    Sexual activity: Not on file   Other Topics Concern    Not on

## 2023-10-18 ENCOUNTER — HOSPITAL ENCOUNTER (OUTPATIENT)
Facility: HOSPITAL | Age: 73
Discharge: HOME | End: 2023-10-21
Attending: INTERNAL MEDICINE
Payer: MEDICARE

## 2023-10-18 ENCOUNTER — HOSPITAL ENCOUNTER (OUTPATIENT)
Facility: HOSPITAL | Age: 73
Discharge: HOME | End: 2023-10-21
Payer: MEDICARE

## 2023-10-18 VITALS — HEIGHT: 66 IN | WEIGHT: 247 LBS | BODY MASS INDEX: 39.7 KG/M2

## 2023-10-18 DIAGNOSIS — Z79.811 AROMATASE INHIBITOR USE: ICD-10-CM

## 2023-10-18 DIAGNOSIS — Z90.11 H/O RIGHT MASTECTOMY: ICD-10-CM

## 2023-10-18 DIAGNOSIS — Z78.0 POST-MENOPAUSAL: ICD-10-CM

## 2023-10-18 DIAGNOSIS — Z12.31 ENCOUNTER FOR SCREENING MAMMOGRAM FOR BREAST CANCER: ICD-10-CM

## 2023-10-18 PROCEDURE — 77063 BREAST TOMOSYNTHESIS BI: CPT

## 2023-10-18 PROCEDURE — 77080 DXA BONE DENSITY AXIAL: CPT

## 2023-10-24 ENCOUNTER — TELEPHONE (OUTPATIENT)
Age: 73
End: 2023-10-24

## 2023-10-24 NOTE — TELEPHONE ENCOUNTER
----- Message from Peyman Liang MD sent at 10/19/2023  3:52 PM EDT -----  Scans look good. Continue with the current plan.

## 2024-01-18 NOTE — PROGRESS NOTES
Monroe Biggs is a 73 y.o. female who presents for follow up.     Treated for HLP.  Trying to follow low fat diet.  On simvastatin, no myalgias     History of left breast cancer.  prior right mastectomy 1991. S/p lumpectomy 2016.  XRT 2017.  On letrozole 2017.  Followed by Dr. Farias.  Mammogram left Oct 2023.       DEXA scan Feb 2019. repeat in Oct 2023.   Osteopenia in LS spine, was started on Prolia by oncology, received one injection. On hold due to dental work.       Prediabetic. Trying to follow diet.       EMILIANO with CPAP.      Colon screening, normal, Feb 2020, Dr Urias.  normal BM.      Normal urination.  Prior NIDHI/BSO.       Up to date on eye exam.          Past Medical History:   Diagnosis Date    Adenocarcinoma of breast (HCC)     2017 LEFT     Breast cancer (HCC)     1991 RIGHT    Cancer (HCC) 1991,2016     breast cancer right,left breast    GERD (gastroesophageal reflux disease)     Hypercholesterolemia     Menopause     Nausea & vomiting     Radiation therapy complication 2017    left breast    Sleep apnea     CPAP       Family History   Problem Relation Age of Onset    Hypertension Sister     Diabetes Sister     Cancer Brother         lung    Cancer Sister         pancreatic    Stroke Father     Heart Disease Father     Diabetes Mother     Dementia Mother     Breast Cancer Mother         Social History     Socioeconomic History    Marital status:      Spouse name: Not on file    Number of children: Not on file    Years of education: Not on file    Highest education level: Not on file   Occupational History    Not on file   Tobacco Use    Smoking status: Never    Smokeless tobacco: Never   Substance and Sexual Activity    Alcohol use: No    Drug use: Never    Sexual activity: Not on file   Other Topics Concern    Not on file   Social History Narrative    Not on file     Social Determinants of Health     Financial Resource Strain: Low Risk  (8/22/2023)    Overall Financial Resource Strain

## 2024-01-19 ENCOUNTER — OFFICE VISIT (OUTPATIENT)
Age: 74
End: 2024-01-19
Payer: MEDICARE

## 2024-01-19 VITALS
SYSTOLIC BLOOD PRESSURE: 138 MMHG | HEIGHT: 66 IN | HEART RATE: 78 BPM | WEIGHT: 247.4 LBS | OXYGEN SATURATION: 96 % | TEMPERATURE: 97.9 F | RESPIRATION RATE: 16 BRPM | BODY MASS INDEX: 39.76 KG/M2 | DIASTOLIC BLOOD PRESSURE: 88 MMHG

## 2024-01-19 DIAGNOSIS — Z85.3 HISTORY OF BREAST CANCER: ICD-10-CM

## 2024-01-19 DIAGNOSIS — Z00.00 MEDICARE ANNUAL WELLNESS VISIT, SUBSEQUENT: ICD-10-CM

## 2024-01-19 DIAGNOSIS — E78.00 PURE HYPERCHOLESTEROLEMIA: Primary | ICD-10-CM

## 2024-01-19 DIAGNOSIS — R73.09 ELEVATED GLUCOSE: ICD-10-CM

## 2024-01-19 PROCEDURE — 99214 OFFICE O/P EST MOD 30 MIN: CPT | Performed by: FAMILY MEDICINE

## 2024-01-19 RX ORDER — FAMOTIDINE 10 MG
10 TABLET ORAL 2 TIMES DAILY
COMMUNITY

## 2024-01-19 RX ORDER — SIMVASTATIN 20 MG
20 TABLET ORAL NIGHTLY
Qty: 90 TABLET | Refills: 3 | Status: SHIPPED | OUTPATIENT
Start: 2024-01-19

## 2024-01-19 ASSESSMENT — PATIENT HEALTH QUESTIONNAIRE - PHQ9
SUM OF ALL RESPONSES TO PHQ QUESTIONS 1-9: 0
SUM OF ALL RESPONSES TO PHQ QUESTIONS 1-9: 0
SUM OF ALL RESPONSES TO PHQ9 QUESTIONS 1 & 2: 0
SUM OF ALL RESPONSES TO PHQ QUESTIONS 1-9: 0
1. LITTLE INTEREST OR PLEASURE IN DOING THINGS: 0
SUM OF ALL RESPONSES TO PHQ QUESTIONS 1-9: 0
2. FEELING DOWN, DEPRESSED OR HOPELESS: 0

## 2024-01-19 ASSESSMENT — LIFESTYLE VARIABLES
HOW MANY STANDARD DRINKS CONTAINING ALCOHOL DO YOU HAVE ON A TYPICAL DAY: PATIENT DOES NOT DRINK
HOW OFTEN DO YOU HAVE A DRINK CONTAINING ALCOHOL: NEVER

## 2024-01-19 NOTE — PROGRESS NOTES
Monroe Biggs is a 73 y.o. female    Chief Complaint   Patient presents with    Medicare AWV       /88   Pulse 78   Temp 97.9 °F (36.6 °C)   Resp 16   Ht 1.676 m (5' 6\")   Wt 112.2 kg (247 lb 6.4 oz)   SpO2 96%   BMI 39.93 kg/m²         1. Have you been to the ER, urgent care clinic since your last visit?  Hospitalized since your last visit? No    2. Have you seen or consulted any other health care providers outside of the [facility] since your last visit?  Include any pap smears or colon screening. No

## 2024-01-19 NOTE — PATIENT INSTRUCTIONS
low-dose aspirin. Wait for an ambulance. Do not try to drive yourself.  Watch closely for changes in your health, and be sure to contact your doctor if you have any problems.  Where can you learn more?  Go to https://www.[employer].net/patientEd and enter F075 to learn more about \"A Healthy Heart: Care Instructions.\"  Current as of: June 25, 2023               Content Version: 13.9  © 2006-2023 [employer].   Care instructions adapted under license by [employer]. If you have questions about a medical condition or this instruction, always ask your healthcare professional. [employer] disclaims any warranty or liability for your use of this information.      Personalized Preventive Plan for Monroe Biggs - 1/19/2024  Medicare offers a range of preventive health benefits. Some of the tests and screenings are paid in full while other may be subject to a deductible, co-insurance, and/or copay.    Some of these benefits include a comprehensive review of your medical history including lifestyle, illnesses that may run in your family, and various assessments and screenings as appropriate.    After reviewing your medical record and screening and assessments performed today your provider may have ordered immunizations, labs, imaging, and/or referrals for you.  A list of these orders (if applicable) as well as your Preventive Care list are included within your After Visit Summary for your review.    Other Preventive Recommendations:    A preventive eye exam performed by an eye specialist is recommended every 1-2 years to screen for glaucoma; cataracts, macular degeneration, and other eye disorders.  A preventive dental visit is recommended every 6 months.  Try to get at least 150 minutes of exercise per week or 10,000 steps per day on a pedometer .  Order or download the FREE \"Exercise & Physical Activity: Your Everyday Guide\" from [employer]. Call 1-975.438.4900 or search The

## 2024-07-15 ENCOUNTER — OFFICE VISIT (OUTPATIENT)
Age: 74
End: 2024-07-15
Payer: MEDICARE

## 2024-07-15 ENCOUNTER — LAB (OUTPATIENT)
Age: 74
End: 2024-07-15

## 2024-07-15 VITALS
RESPIRATION RATE: 16 BRPM | WEIGHT: 252.4 LBS | HEIGHT: 66 IN | DIASTOLIC BLOOD PRESSURE: 78 MMHG | TEMPERATURE: 97.6 F | OXYGEN SATURATION: 99 % | SYSTOLIC BLOOD PRESSURE: 111 MMHG | HEART RATE: 77 BPM | BODY MASS INDEX: 40.56 KG/M2

## 2024-07-15 DIAGNOSIS — E78.00 PURE HYPERCHOLESTEROLEMIA: ICD-10-CM

## 2024-07-15 DIAGNOSIS — E66.01 OBESITY, CLASS III, BMI 40-49.9 (MORBID OBESITY) (HCC): ICD-10-CM

## 2024-07-15 DIAGNOSIS — C50.412 MALIGNANT NEOPLASM OF UPPER-OUTER QUADRANT OF LEFT BREAST IN FEMALE, ESTROGEN RECEPTOR POSITIVE (HCC): Primary | ICD-10-CM

## 2024-07-15 DIAGNOSIS — Z85.3 HISTORY OF BREAST CANCER: ICD-10-CM

## 2024-07-15 DIAGNOSIS — Z51.81 ENCOUNTER FOR MONITORING ADJUVANT HORMONAL THERAPY: ICD-10-CM

## 2024-07-15 DIAGNOSIS — R73.09 ELEVATED GLUCOSE: ICD-10-CM

## 2024-07-15 DIAGNOSIS — Z79.899 ENCOUNTER FOR MONITORING ADJUVANT HORMONAL THERAPY: ICD-10-CM

## 2024-07-15 DIAGNOSIS — Z17.0 MALIGNANT NEOPLASM OF UPPER-OUTER QUADRANT OF LEFT BREAST IN FEMALE, ESTROGEN RECEPTOR POSITIVE (HCC): Primary | ICD-10-CM

## 2024-07-15 LAB
ALBUMIN SERPL-MCNC: 4.1 G/DL (ref 3.5–5)
ALBUMIN/GLOB SERPL: 1.2 (ref 1.1–2.2)
ALP SERPL-CCNC: 94 U/L (ref 45–117)
ALT SERPL-CCNC: 31 U/L (ref 12–78)
ANION GAP SERPL CALC-SCNC: 6 MMOL/L (ref 5–15)
AST SERPL-CCNC: 23 U/L (ref 15–37)
BASOPHILS # BLD: 0 K/UL (ref 0–0.1)
BASOPHILS NFR BLD: 1 % (ref 0–1)
BILIRUB SERPL-MCNC: 0.5 MG/DL (ref 0.2–1)
BUN SERPL-MCNC: 12 MG/DL (ref 6–20)
BUN/CREAT SERPL: 12 (ref 12–20)
CALCIUM SERPL-MCNC: 9.4 MG/DL (ref 8.5–10.1)
CHLORIDE SERPL-SCNC: 106 MMOL/L (ref 97–108)
CHOLEST SERPL-MCNC: 147 MG/DL
CO2 SERPL-SCNC: 27 MMOL/L (ref 21–32)
CREAT SERPL-MCNC: 1.04 MG/DL (ref 0.55–1.02)
DIFFERENTIAL METHOD BLD: ABNORMAL
EOSINOPHIL # BLD: 0.2 K/UL (ref 0–0.4)
EOSINOPHIL NFR BLD: 4 % (ref 0–7)
ERYTHROCYTE [DISTWIDTH] IN BLOOD BY AUTOMATED COUNT: 14.5 % (ref 11.5–14.5)
EST. AVERAGE GLUCOSE BLD GHB EST-MCNC: 126 MG/DL
GLOBULIN SER CALC-MCNC: 3.5 G/DL (ref 2–4)
GLUCOSE SERPL-MCNC: 113 MG/DL (ref 65–100)
HBA1C MFR BLD: 6 % (ref 4–5.6)
HCT VFR BLD AUTO: 36.3 % (ref 35–47)
HDLC SERPL-MCNC: 48 MG/DL
HDLC SERPL: 3.1 (ref 0–5)
HGB BLD-MCNC: 13.4 G/DL (ref 11.5–16)
IMM GRANULOCYTES # BLD AUTO: 0 K/UL (ref 0–0.04)
IMM GRANULOCYTES NFR BLD AUTO: 0 % (ref 0–0.5)
LDLC SERPL CALC-MCNC: 81 MG/DL (ref 0–100)
LYMPHOCYTES # BLD: 1.6 K/UL (ref 0.8–3.5)
LYMPHOCYTES NFR BLD: 36 % (ref 12–49)
MCH RBC QN AUTO: 30.7 PG (ref 26–34)
MCHC RBC AUTO-ENTMCNC: 36.9 G/DL (ref 30–36.5)
MCV RBC AUTO: 83.1 FL (ref 80–99)
MONOCYTES # BLD: 0.5 K/UL (ref 0–1)
MONOCYTES NFR BLD: 11 % (ref 5–13)
NEUTS SEG # BLD: 2.1 K/UL (ref 1.8–8)
NEUTS SEG NFR BLD: 48 % (ref 32–75)
NRBC # BLD: 0 K/UL (ref 0–0.01)
NRBC BLD-RTO: 0 PER 100 WBC
PLATELET # BLD AUTO: 196 K/UL (ref 150–400)
PMV BLD AUTO: 12 FL (ref 8.9–12.9)
POTASSIUM SERPL-SCNC: 4.6 MMOL/L (ref 3.5–5.1)
PROT SERPL-MCNC: 7.6 G/DL (ref 6.4–8.2)
RBC # BLD AUTO: 4.37 M/UL (ref 3.8–5.2)
SODIUM SERPL-SCNC: 139 MMOL/L (ref 136–145)
TRIGL SERPL-MCNC: 90 MG/DL
VLDLC SERPL CALC-MCNC: 18 MG/DL
WBC # BLD AUTO: 4.3 K/UL (ref 3.6–11)

## 2024-07-15 PROCEDURE — G8399 PT W/DXA RESULTS DOCUMENT: HCPCS | Performed by: INTERNAL MEDICINE

## 2024-07-15 PROCEDURE — 1123F ACP DISCUSS/DSCN MKR DOCD: CPT | Performed by: INTERNAL MEDICINE

## 2024-07-15 PROCEDURE — 3017F COLORECTAL CA SCREEN DOC REV: CPT | Performed by: INTERNAL MEDICINE

## 2024-07-15 PROCEDURE — 99213 OFFICE O/P EST LOW 20 MIN: CPT | Performed by: INTERNAL MEDICINE

## 2024-07-15 PROCEDURE — G8427 DOCREV CUR MEDS BY ELIG CLIN: HCPCS | Performed by: INTERNAL MEDICINE

## 2024-07-15 PROCEDURE — 1090F PRES/ABSN URINE INCON ASSESS: CPT | Performed by: INTERNAL MEDICINE

## 2024-07-15 PROCEDURE — G8417 CALC BMI ABV UP PARAM F/U: HCPCS | Performed by: INTERNAL MEDICINE

## 2024-07-15 PROCEDURE — 1036F TOBACCO NON-USER: CPT | Performed by: INTERNAL MEDICINE

## 2024-07-15 ASSESSMENT — PATIENT HEALTH QUESTIONNAIRE - PHQ9
SUM OF ALL RESPONSES TO PHQ QUESTIONS 1-9: 0
SUM OF ALL RESPONSES TO PHQ9 QUESTIONS 1 & 2: 0
SUM OF ALL RESPONSES TO PHQ QUESTIONS 1-9: 0
2. FEELING DOWN, DEPRESSED OR HOPELESS: NOT AT ALL
SUM OF ALL RESPONSES TO PHQ QUESTIONS 1-9: 0
1. LITTLE INTEREST OR PLEASURE IN DOING THINGS: NOT AT ALL
SUM OF ALL RESPONSES TO PHQ QUESTIONS 1-9: 0

## 2024-07-15 NOTE — PROGRESS NOTES
Patient identified with two identification factors, Name and Date of Birth.    Chief Complaint   Patient presents with    Follow-up     1 year follow-up Malignant neoplasm off upper-outer quadrant of left breast in female, estrogen receptor positive       /78 (Site: Left Upper Arm, Position: Sitting, Cuff Size: Large Adult)   Pulse 77   Temp 97.6 °F (36.4 °C) (Oral)   Resp 16   Ht 1.676 m (5' 6\")   Wt 114.5 kg (252 lb 6.4 oz)   SpO2 99%   BMI 40.74 kg/m²       1. \"Have you been to the ER, urgent care clinic since your last visit?  Hospitalized since your last visit?\" Yes. [facility] in April for UTI treated with antibiotics    2. \"Have you seen or consulted any other health care providers outside of the [facility] since your last visit?\"

## 2024-07-15 NOTE — PROGRESS NOTES
[facility]   at [facility]   [address], [facility], [address]   [place] [id number] [phone]        Follow-up Note          Patient: Monroe Biggs  MRN: [id number]   SSN: [id number]    YOB: 1950               Diagnosis:        1. Left breast carcinoma:   pT1b N0 M0 (Stage IA) mucinous carcinoma, Dx: 12/9/2016        Treatment:        1. On Letrozole - started 5/1/2017   2. Completed radiation therapy on 4/28/2017   3. Lumpectomy on 1/18/2017        Subjective:         Monroe Biggs is a 72 y.o.  female with a diagnosis of left sided breast carcinoma. She underwent a screening  mammogram and was noted to have an abnormality in the left breast. Biopsy showed mucinous carcinoma which is ER and PA +ve. She was seen by Dr. Jacob and then underwent left breast lumpectomy on 01/18/2017.      She finished radiation therapy on 4/28/2017. She started hormonal therapy with Letrozole and is tolerating it well without complaints.         Review of Systems:      Constitutional: negative   Eyes: negative   Ears, Nose, Mouth, Throat, and Face: negative   Respiratory: negative   Cardiovascular: negative   Gastrointestinal: negative   Genitourinary:negative   Integument/Breast: negative   Hematologic/Lymphatic: negative   Musculoskeletal: mild arthralgias   Neurological: negative      Review of systems was reviewed and updated as needed on 07/08/22 .       Past Medical History:   Diagnosis Date    Adenocarcinoma of breast (HCC)     2017 LEFT     Breast cancer (HCC)     1991 RIGHT    Cancer (HCC) 1991,2016     breast cancer right,left breast    GERD (gastroesophageal reflux disease)     Hypercholesterolemia     Menopause     Nausea & vomiting     Radiation therapy complication 2017    left breast    Sleep apnea     CPAP       Past Surgical History:   Procedure Laterality Date    BREAST BIOPSY Left 2016    stereotatic    BREAST LUMPECTOMY Left

## 2024-08-06 NOTE — PROGRESS NOTES
Vaping Use    Vaping Use: Never used   Substance and Sexual Activity    Alcohol use: No    Drug use: Never    Sexual activity: Yes     Partners: Male     Birth control/protection: None   Other Topics Concern    Not on file   Social History Narrative    Not on file     Social Determinants of Health     Financial Resource Strain: Low Risk  (8/7/2024)    Overall Financial Resource Strain (CARDIA)     Difficulty of Paying Living Expenses: Not hard at all   Food Insecurity: No Food Insecurity (8/7/2024)    Hunger Vital Sign     Worried About Running Out of Food in the Last Year: Never true     Ran Out of Food in the Last Year: Never true   Transportation Needs: Unknown (8/7/2024)    PRAPARE - Transportation     Lack of Transportation (Medical): Not on file     Lack of Transportation (Non-Medical): No   Physical Activity: Insufficiently Active (1/19/2024)    Exercise Vital Sign     Days of Exercise per Week: 2 days     Minutes of Exercise per Session: 30 min   Stress: Not on file   Social Connections: Not on file   Intimate Partner Violence: Not on file   Housing Stability: Unknown (8/7/2024)    Housing Stability Vital Sign     Unable to Pay for Housing in the Last Year: Not on file     Number of Places Lived in the Last Year: Not on file     Unstable Housing in the Last Year: No        Current Outpatient Medications on File Prior to Visit   Medication Sig Dispense Refill    famotidine (PEPCID) 10 MG tablet Take 1 tablet by mouth as needed      simvastatin (ZOCOR) 20 MG tablet Take 1 tablet by mouth nightly 90 tablet 3    letrozole (FEMARA) 2.5 MG tablet Take 1 tablet by mouth daily 90 tablet 3    aspirin 81 MG EC tablet Takes irregularly,usually weekly      Cholecalciferol 50 MCG (2000 UT) TABS Take one capsule daily       No current facility-administered medications on file prior to visit.       Review of Systems  Pertinent items are noted in HPI.    Objective:     /80 (Site: Left Upper Arm, Position: Sitting,  [Negative] : Endocrine

## 2024-08-07 ENCOUNTER — OFFICE VISIT (OUTPATIENT)
Age: 74
End: 2024-08-07
Payer: MEDICARE

## 2024-08-07 VITALS
BODY MASS INDEX: 40.18 KG/M2 | WEIGHT: 250 LBS | TEMPERATURE: 97.2 F | HEART RATE: 68 BPM | SYSTOLIC BLOOD PRESSURE: 125 MMHG | OXYGEN SATURATION: 99 % | HEIGHT: 66 IN | DIASTOLIC BLOOD PRESSURE: 80 MMHG | RESPIRATION RATE: 16 BRPM

## 2024-08-07 DIAGNOSIS — Z85.3 HISTORY OF BREAST CANCER: ICD-10-CM

## 2024-08-07 DIAGNOSIS — G47.33 OSA (OBSTRUCTIVE SLEEP APNEA): ICD-10-CM

## 2024-08-07 DIAGNOSIS — R73.03 PREDIABETES: ICD-10-CM

## 2024-08-07 DIAGNOSIS — E78.00 PURE HYPERCHOLESTEROLEMIA: Primary | ICD-10-CM

## 2024-08-07 DIAGNOSIS — R73.09 ELEVATED GLUCOSE: ICD-10-CM

## 2024-08-07 DIAGNOSIS — E66.01 CLASS 3 SEVERE OBESITY WITH SERIOUS COMORBIDITY AND BODY MASS INDEX (BMI) OF 40.0 TO 44.9 IN ADULT, UNSPECIFIED OBESITY TYPE (HCC): ICD-10-CM

## 2024-08-07 PROBLEM — E66.813 CLASS 3 SEVERE OBESITY WITH SERIOUS COMORBIDITY AND BODY MASS INDEX (BMI) OF 40.0 TO 44.9 IN ADULT: Status: ACTIVE | Noted: 2018-05-23

## 2024-08-07 PROCEDURE — 99214 OFFICE O/P EST MOD 30 MIN: CPT | Performed by: FAMILY MEDICINE

## 2024-08-07 PROCEDURE — G8417 CALC BMI ABV UP PARAM F/U: HCPCS | Performed by: FAMILY MEDICINE

## 2024-08-07 PROCEDURE — 1123F ACP DISCUSS/DSCN MKR DOCD: CPT | Performed by: FAMILY MEDICINE

## 2024-08-07 PROCEDURE — 1036F TOBACCO NON-USER: CPT | Performed by: FAMILY MEDICINE

## 2024-08-07 PROCEDURE — 3017F COLORECTAL CA SCREEN DOC REV: CPT | Performed by: FAMILY MEDICINE

## 2024-08-07 PROCEDURE — 1090F PRES/ABSN URINE INCON ASSESS: CPT | Performed by: FAMILY MEDICINE

## 2024-08-07 PROCEDURE — G8427 DOCREV CUR MEDS BY ELIG CLIN: HCPCS | Performed by: FAMILY MEDICINE

## 2024-08-07 PROCEDURE — G8399 PT W/DXA RESULTS DOCUMENT: HCPCS | Performed by: FAMILY MEDICINE

## 2024-08-07 SDOH — ECONOMIC STABILITY: FOOD INSECURITY: WITHIN THE PAST 12 MONTHS, YOU WORRIED THAT YOUR FOOD WOULD RUN OUT BEFORE YOU GOT MONEY TO BUY MORE.: NEVER TRUE

## 2024-08-07 SDOH — ECONOMIC STABILITY: INCOME INSECURITY: HOW HARD IS IT FOR YOU TO PAY FOR THE VERY BASICS LIKE FOOD, HOUSING, MEDICAL CARE, AND HEATING?: NOT HARD AT ALL

## 2024-08-07 SDOH — ECONOMIC STABILITY: FOOD INSECURITY: WITHIN THE PAST 12 MONTHS, THE FOOD YOU BOUGHT JUST DIDN'T LAST AND YOU DIDN'T HAVE MONEY TO GET MORE.: NEVER TRUE

## 2024-08-07 ASSESSMENT — PATIENT HEALTH QUESTIONNAIRE - PHQ9
SUM OF ALL RESPONSES TO PHQ QUESTIONS 1-9: 0
SUM OF ALL RESPONSES TO PHQ QUESTIONS 1-9: 0
SUM OF ALL RESPONSES TO PHQ9 QUESTIONS 1 & 2: 0
1. LITTLE INTEREST OR PLEASURE IN DOING THINGS: NOT AT ALL
2. FEELING DOWN, DEPRESSED OR HOPELESS: NOT AT ALL
SUM OF ALL RESPONSES TO PHQ QUESTIONS 1-9: 0
SUM OF ALL RESPONSES TO PHQ QUESTIONS 1-9: 0

## 2024-09-20 RX ORDER — LETROZOLE 2.5 MG/1
2.5 TABLET, FILM COATED ORAL DAILY
Qty: 90 TABLET | Refills: 3 | Status: SHIPPED | OUTPATIENT
Start: 2024-09-20

## 2024-10-02 ENCOUNTER — TRANSCRIBE ORDERS (OUTPATIENT)
Facility: HOSPITAL | Age: 74
End: 2024-10-02

## 2024-10-02 DIAGNOSIS — Z12.31 VISIT FOR SCREENING MAMMOGRAM: Primary | ICD-10-CM

## 2024-11-07 ENCOUNTER — HOSPITAL ENCOUNTER (OUTPATIENT)
Facility: HOSPITAL | Age: 74
Discharge: HOME | End: 2024-11-10
Payer: MEDICARE

## 2024-11-07 DIAGNOSIS — Z12.31 VISIT FOR SCREENING MAMMOGRAM: ICD-10-CM

## 2024-11-07 PROCEDURE — 77063 BREAST TOMOSYNTHESIS BI: CPT

## 2025-02-10 ENCOUNTER — TELEPHONE (OUTPATIENT)
Age: 75
End: 2025-02-10

## 2025-02-10 NOTE — TELEPHONE ENCOUNTER
----- Message from Laureen RG sent at 2/10/2025 12:25 PM EST -----  Regarding: [facility] Appointment Request  [facility] Appointment Request    Patient needs appointment for [facility] Appointment Type: Annual Visit.    Patient Requested Dates(s): Any dates as soon as possible as long it's Wednesday  Patient Requested Time: any time   Provider Name: Temitope Guerra MD        Reason for Appointment Request: Established Patient - Available appointments did not meet patient need  --------------------------------------------------------------------------------------------------------------------------    Relationship to Patient: Self     Call Back Information: OK to leave message on voicemail  Preferred Call Back Number: Phone [phone]    Patient was suppose to have her wellness visit by February 12 but can't go due to the weather so she would like to reschedule it as soon as possible to another date

## 2025-03-14 ENCOUNTER — HOSPITAL ENCOUNTER (OUTPATIENT)
Facility: HOSPITAL | Age: 75
Discharge: HOME | End: 2025-03-17
Payer: MEDICARE

## 2025-03-14 DIAGNOSIS — M79.672 LEFT FOOT PAIN: ICD-10-CM

## 2025-03-14 PROCEDURE — 73630 X-RAY EXAM OF FOOT: CPT

## 2025-03-18 ENCOUNTER — RESULTS FOLLOW-UP (OUTPATIENT)
Facility: HOSPITAL | Age: 75
End: 2025-03-18

## 2025-07-09 ENCOUNTER — OFFICE VISIT (OUTPATIENT)
Age: 75
End: 2025-07-09
Payer: MEDICARE

## 2025-07-09 VITALS
DIASTOLIC BLOOD PRESSURE: 79 MMHG | TEMPERATURE: 98 F | OXYGEN SATURATION: 98 % | BODY MASS INDEX: 40.53 KG/M2 | HEART RATE: 75 BPM | HEIGHT: 66 IN | SYSTOLIC BLOOD PRESSURE: 127 MMHG | WEIGHT: 252.2 LBS

## 2025-07-09 DIAGNOSIS — C50.412 MALIGNANT NEOPLASM OF UPPER-OUTER QUADRANT OF LEFT BREAST IN FEMALE, ESTROGEN RECEPTOR POSITIVE (HCC): Primary | ICD-10-CM

## 2025-07-09 DIAGNOSIS — Z17.0 MALIGNANT NEOPLASM OF UPPER-OUTER QUADRANT OF LEFT BREAST IN FEMALE, ESTROGEN RECEPTOR POSITIVE (HCC): Primary | ICD-10-CM

## 2025-07-09 DIAGNOSIS — Z78.0 OSTEOPENIA AFTER MENOPAUSE: ICD-10-CM

## 2025-07-09 DIAGNOSIS — Z51.81 ENCOUNTER FOR MONITORING ADJUVANT HORMONAL THERAPY: ICD-10-CM

## 2025-07-09 DIAGNOSIS — M85.80 OSTEOPENIA AFTER MENOPAUSE: ICD-10-CM

## 2025-07-09 DIAGNOSIS — Z79.899 ENCOUNTER FOR MONITORING ADJUVANT HORMONAL THERAPY: ICD-10-CM

## 2025-07-09 PROCEDURE — G8417 CALC BMI ABV UP PARAM F/U: HCPCS | Performed by: INTERNAL MEDICINE

## 2025-07-09 PROCEDURE — 99213 OFFICE O/P EST LOW 20 MIN: CPT | Performed by: INTERNAL MEDICINE

## 2025-07-09 PROCEDURE — G8399 PT W/DXA RESULTS DOCUMENT: HCPCS | Performed by: INTERNAL MEDICINE

## 2025-07-09 PROCEDURE — 1090F PRES/ABSN URINE INCON ASSESS: CPT | Performed by: INTERNAL MEDICINE

## 2025-07-09 PROCEDURE — 1036F TOBACCO NON-USER: CPT | Performed by: INTERNAL MEDICINE

## 2025-07-09 PROCEDURE — G8428 CUR MEDS NOT DOCUMENT: HCPCS | Performed by: INTERNAL MEDICINE

## 2025-07-09 PROCEDURE — 3017F COLORECTAL CA SCREEN DOC REV: CPT | Performed by: INTERNAL MEDICINE

## 2025-07-09 PROCEDURE — 1126F AMNT PAIN NOTED NONE PRSNT: CPT | Performed by: INTERNAL MEDICINE

## 2025-07-09 PROCEDURE — 1123F ACP DISCUSS/DSCN MKR DOCD: CPT | Performed by: INTERNAL MEDICINE

## 2025-07-09 NOTE — PROGRESS NOTES
Monroe Biggs is a 72 y.o. female here for one year follow up for right breast cancer.  She is taking Letrozole everyday.  Pt has been well. No concerns brought up.     1. Have you been to the ER, urgent care clinic since your last visit?  Hospitalized since your last visit?    2. Have you seen or consulted any other health care providers outside of the [facility] since your last visit?  Include any pap smears or colon screening. [facility] - fall/left elbow pain  
Year: No     Number of Times Moved in the Last Year: 0     Homeless in the Last Year: No       Current Outpatient Medications   Medication Sig Dispense Refill    simvastatin (ZOCOR) 20 MG tablet Take 1 tablet by mouth nightly 90 tablet 3    letrozole (FEMARA) 2.5 MG tablet TAKE 1 TABLET BY MOUTH DAILY 90 tablet 3    famotidine (PEPCID) 10 MG tablet Take 1 tablet by mouth as needed      aspirin 81 MG EC tablet Takes irregularly,usually weekly      Cholecalciferol 50 MCG (2000 UT) TABS Take one capsule daily       No current facility-administered medications for this visit.       No Known Allergies             Objective:       Vitals:    07/09/25 0924   BP: 127/79   Pulse: 75   Temp: 98 °F (36.7 °C)   SpO2: 98%           Pain Scale: 0 - No pain/10   Pain Location:          Physical Exam:      GENERAL: alert, cooperative   EYE: negative   LYMPHATIC: Cervical, supraclavicular, and axillary nodes normal.    THROAT & NECK: normal and no erythema or exudates noted.    LUNG: clear to auscultation bilaterally   HEART: regular rate and rhythm   ABDOMEN: soft, non-tender   EXTREMITIES: no cyanosis or edema   SKIN: Normal.   NEUROLOGIC: negative      The above physical exam was reviewed and updated as needed on 07/08/22.                Assessment:        1. 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      Mammogram (10/14/2021): normal        2. Vitamin D deficiency      > Taking OTC Vit D        3. Obesity    Managed by PCP           Plan:          > Continue Letrozole 2.5 mg daily   > Follow-up

## (undated) DEVICE — 1200 GUARD II KIT W/5MM TUBE W/O VAC TUBE: Brand: GUARDIAN

## (undated) DEVICE — SYR 10ML LUER LOK 1/5ML GRAD --

## (undated) DEVICE — NEONATAL-ADULT SPO2 SENSOR: Brand: NELLCOR

## (undated) DEVICE — BASIN EMSIS 16OZ GRAPHITE PLAS KID SHP MOLD GRAD FOR ORAL

## (undated) DEVICE — CATH IV AUTOGRD BC BLU 22GA 25 -- INSYTE

## (undated) DEVICE — Device

## (undated) DEVICE — SET ADMIN 16ML TBNG L100IN 2 Y INJ SITE IV PIGGY BK DISP

## (undated) DEVICE — ELECTRODE,RADIOTRANSLUCENT,FOAM,5PK: Brand: MEDLINE

## (undated) DEVICE — SYR 3ML LL TIP 1/10ML GRAD --

## (undated) DEVICE — TOWEL 4 PLY TISS 19X30 SUE WHT

## (undated) DEVICE — SOLIDIFIER MEDC 1200ML -- CONVERT TO 356117

## (undated) DEVICE — NEEDLE HYPO 18GA L1.5IN PNK S STL HUB POLYPR SHLD REG BVL

## (undated) DEVICE — Z DISCONTINUED PER MEDLINE LINE GAS SAMPLING O2/CO2 LNG AD 13 FT NSL W/ TBNG FILTERLINE